# Patient Record
Sex: FEMALE | Race: WHITE | NOT HISPANIC OR LATINO | Employment: UNEMPLOYED | URBAN - NONMETROPOLITAN AREA
[De-identification: names, ages, dates, MRNs, and addresses within clinical notes are randomized per-mention and may not be internally consistent; named-entity substitution may affect disease eponyms.]

---
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
## 2023-01-28 ENCOUNTER — APPOINTMENT (EMERGENCY)
Dept: RADIOLOGY | Facility: HOSPITAL | Age: 18
End: 2023-01-28

## 2023-01-28 ENCOUNTER — HOSPITAL ENCOUNTER (EMERGENCY)
Facility: HOSPITAL | Age: 18
Discharge: HOME | End: 2023-01-29
Attending: EMERGENCY MEDICINE

## 2023-01-28 VITALS
DIASTOLIC BLOOD PRESSURE: 70 MMHG | TEMPERATURE: 98.7 F | HEART RATE: 57 BPM | SYSTOLIC BLOOD PRESSURE: 124 MMHG | OXYGEN SATURATION: 99 % | WEIGHT: 130.51 LBS | RESPIRATION RATE: 16 BRPM | BODY MASS INDEX: 23.87 KG/M2

## 2023-01-28 DIAGNOSIS — M79.671 ACUTE FOOT PAIN, RIGHT: Primary | ICD-10-CM

## 2023-01-28 RX ORDER — IBUPROFEN 600 MG/1
600 TABLET ORAL ONCE
Status: COMPLETED | OUTPATIENT
Start: 2023-01-28 | End: 2023-01-28

## 2023-01-28 RX ADMIN — IBUPROFEN 600 MG: 600 TABLET, FILM COATED ORAL at 23:39

## 2023-01-29 NOTE — DISCHARGE INSTRUCTIONS
You have been seen for right ankle pain  Please take tylenol and motrin OTC as discussed  Please use the provided crutches and ankle splint as discussed  Return to the emergency department if you develop worsening pain, weakness/numbness/tingling or any other symptoms of concern  Please follow up with podiatry by calling the number provided

## 2023-01-29 NOTE — ED PROVIDER NOTES
History  Chief Complaint   Patient presents with   • Foot Pain     Right foot pain, injured doing a front hand spring, occurred last pain  Took Motrin early this morning for pain  Martins Peaks is a 21y o  year old female presenting to the [place] [address] ED for right foot/ankle pain  Patient has had one day of pain after landing awkwardly on the right ankle when doing a front hand spring  She did not hear a pop or click  Pain currently 6/10, constant and nonradiating  Worse when walking on the right foot  Mostly located on the outside of the right foot  Mild swelling in the area  No weakness/numbness/tingling in the RLE  The patient has taken motrin at home for symptomatic treatment   used: No        Prior to Admission Medications   Prescriptions Last Dose Informant Patient Reported? Taking?    Flovent  MCG/ACT inhaler   Yes No   Sig: Inhale 2 puffs 2 (two) times a day   Patient not taking: Reported on 2023   Prenatal MV-Min-Fe Fum-FA-DHA (PRENATAL 1 PO)   Yes No   Sig: Take by mouth   Patient not taking: Reported on 2022   Xulane 150-35 MCG/24HR   Yes No   Sig: APPLY 1 PATCH ONE TIME PER WEEK   albuterol (PROVENTIL HFA,VENTOLIN HFA) 90 mcg/act inhaler   Yes No   Sig: Inhale 2 puffs every 6 (six) hours as needed for wheezing     ferrous sulfate 325 (65 Fe) mg tablet   Yes No   Patient not taking: Reported on 2023   fluticasone (FLONASE) 50 mcg/act nasal spray   No No   Si spray into each nostril daily   Patient not taking: Reported on 2023   ondansetron (Zofran ODT) 4 mg disintegrating tablet   No No   Sig: Take 1 tablet (4 mg total) by mouth every 6 (six) hours as needed for nausea or vomiting      Facility-Administered Medications: None       Past Medical History:   Diagnosis Date   • Anxiety    • Asthma    • Back pain    • Depression    • POTS (postural orthostatic tachycardia syndrome)        Past Surgical History:   Procedure Laterality Date   • TONSILLECTOMY     • TONSILLECTOMY         Family History   Problem Relation Age of Onset   • Bipolar disorder Mother    • Scoliosis Mother    • Allergy (severe) Sister    • Kidney disease Brother    • Breast cancer Maternal Aunt    • Diabetes Maternal Grandmother      I have reviewed and agree with the history as documented  E-Cigarette/Vaping   • E-Cigarette Use Never User      E-Cigarette/Vaping Substances   • Nicotine No    • THC No    • CBD No    • Flavoring No    • Other No    • Unknown No      Social History     Tobacco Use   • Smoking status: Former     Packs/day: 0 25     Types: Cigarettes   • Smokeless tobacco: Never   • Tobacco comments:     one cigarette a day   Vaping Use   • Vaping Use: Never used   Substance Use Topics   • Alcohol use: Not Currently   • Drug use: Not Currently     Comment: previopus use of meth  Review of Systems   Constitutional: Negative for fever  Respiratory: Negative for shortness of breath  Cardiovascular: Negative for chest pain  Gastrointestinal: Negative for abdominal pain  Musculoskeletal: Positive for arthralgias  Negative for myalgias  Skin: Negative for wound  Neurological: Negative for syncope, weakness, numbness and headaches  All other systems reviewed and are negative  Physical Exam  Physical Exam  Vitals and nursing note reviewed  Constitutional:       General: She is not in acute distress  Appearance: Normal appearance  She is well-developed  She is not ill-appearing, toxic-appearing or diaphoretic  HENT:      Head: Normocephalic and atraumatic  Nose: No congestion or rhinorrhea  Eyes:      General:         Right eye: No discharge  Left eye: No discharge  Cardiovascular:      Rate and Rhythm: Normal rate and regular rhythm  Pulmonary:      Effort: Pulmonary effort is normal  No accessory muscle usage or respiratory distress  Breath sounds: Normal breath sounds  No stridor   No decreased breath sounds, wheezing, rhonchi or rales  Abdominal:      General: There is no distension  Palpations: Abdomen is soft  Tenderness: There is no abdominal tenderness  There is no guarding or rebound  Musculoskeletal:      Cervical back: Normal range of motion  No rigidity  Right knee: No swelling or bony tenderness  Normal range of motion  No tenderness  Right lower leg: No tenderness  No edema  Left lower leg: No tenderness  No edema  Right ankle: No swelling  Tenderness present over the ATF ligament  Right Achilles Tendon: No tenderness  Right foot: Normal range of motion and normal capillary refill  Swelling, tenderness and bony tenderness (proximal 4/5 metatarsal) present  No deformity or crepitus  Normal pulse  Skin:     Capillary Refill: Capillary refill takes less than 2 seconds  Findings: No abrasion or laceration  Neurological:      Mental Status: She is alert and oriented to person, place, and time  Psychiatric:         Mood and Affect: Mood normal          Behavior: Behavior normal          Vital Signs  ED Triage Vitals [01/28/23 2240]   Temperature Pulse Respirations Blood Pressure SpO2   98 7 °F (37 1 °C) 57 16 124/70 99 %      Temp Source Heart Rate Source Patient Position - Orthostatic VS BP Location FiO2 (%)   Tympanic Monitor Sitting Right arm --      Pain Score       5           Vitals:    01/28/23 2240   BP: 124/70   Pulse: 57   Patient Position - Orthostatic VS: Sitting         Visual Acuity      ED Medications  Medications   ibuprofen (MOTRIN) tablet 600 mg (600 mg Oral Given 1/28/23 2339)       Diagnostic Studies  Results Reviewed     None                 XR foot 3+ views RIGHT   ED Interpretation by Carter Sanderson DO (01/28 2324)   No acute fracture or dislocation  XR ankle 3+ views RIGHT   ED Interpretation by Carter Sanderson DO (01/28 2324)   No acute fracture or dislocation                   Procedures  Procedures         ED Course Medical Decision Making    21 y o  female presenting for right foot/ankle pain  RLE NVI  Will order xray and treat symptomatically  I have reviewed preliminary ED interpretation of x-rays with the patient  The patient understands that their x-rays will be interpreted independently by a radiologist at a later time  The patient is agreeable to discharge at this time and understands that they may be called back to the emergency department pending formal xray interpretation by radiology  I have discussed with the patient our plan to discharge them from the ED and the patient is in agreement with this plan  The patient was provided a written after visit summary with strict RTED precautions  Discharge Plan: PRN tylenol/motrin, RLE in boot  Recommended crutches and NWB RLE, patient declines crutches  Followup: I have discussed with the patient plan to follow up with podiatry  Contact information provided in AVS     Acute foot pain, right: acute illness or injury  Amount and/or Complexity of Data Reviewed  Radiology: ordered and independent interpretation performed  Risk  Prescription drug management  Disposition  Final diagnoses:   Acute foot pain, right     Time reflects when diagnosis was documented in both MDM as applicable and the Disposition within this note     Time User Action Codes Description Comment    1/28/2023 11:24 PM [address]ang Apt Add [M25 571] Acute right ankle pain     1/28/2023 11:30 PM [address] Apt Remove [Z41 663] Acute right ankle pain     1/28/2023 11:30 PM [address] Apt Add [M28 947] Acute foot pain, right       ED Disposition     ED Disposition   Discharge    Condition   Stable    Date/Time   Sat Jan 28, 2023 11:24 PM    Comment   Kyrie Rodríguez discharge to home/self care                 Follow-up Information     Follow up With Specialties Details Why Contact Info Additional Information    [facility] Schedule an appointment as soon as possible for a visit in 1 week  [address] [facility] [address]  [address] [place] [address], [address] [place] [address], [id number], [phone]          Discharge Medication List as of 1/29/2023 12:03 AM      CONTINUE these medications which have NOT CHANGED    Details   albuterol (PROVENTIL HFA,VENTOLIN HFA) 90 mcg/act inhaler Inhale 2 puffs every 6 (six) hours as needed for wheezing  , Historical Med      ferrous sulfate 325 (65 Fe) mg tablet Starting Mon 3/28/2022, Historical Med      Flovent  MCG/ACT inhaler Inhale 2 puffs 2 (two) times a day, Starting Mon 4/18/2022, Historical Med      fluticasone (FLONASE) 50 mcg/act nasal spray 1 spray into each nostril daily, Starting Sat 6/18/2022, Normal      ondansetron (Zofran ODT) 4 mg disintegrating tablet Take 1 tablet (4 mg total) by mouth every 6 (six) hours as needed for nausea or vomiting, Starting Fri 6/24/2022, Normal      Prenatal MV-Min-Fe Fum-FA-DHA (PRENATAL 1 PO) Take by mouth, Historical Med      Xulane 150-35 MCG/24HR APPLY 1 PATCH ONE TIME PER WEEK, Historical Med             No discharge procedures on file      PDMP Review     None          ED Provider  Electronically Signed by           Samantha Simmons DO  01/29/23 7863

## 2023-02-06 ENCOUNTER — OFFICE VISIT (OUTPATIENT)
Dept: PODIATRY | Facility: CLINIC | Age: 18
End: 2023-02-06

## 2023-02-06 VITALS
WEIGHT: 127 LBS | BODY MASS INDEX: 23.37 KG/M2 | HEIGHT: 62 IN | DIASTOLIC BLOOD PRESSURE: 72 MMHG | SYSTOLIC BLOOD PRESSURE: 108 MMHG | HEART RATE: 66 BPM

## 2023-02-06 DIAGNOSIS — M25.571 ACUTE RIGHT ANKLE PAIN: Primary | ICD-10-CM

## 2023-02-06 DIAGNOSIS — S93.491A SPRAIN OF ANTERIOR TALOFIBULAR LIGAMENT OF RIGHT ANKLE, INITIAL ENCOUNTER: ICD-10-CM

## 2023-02-06 RX ORDER — ERGOCALCIFEROL 1.25 MG/1
CAPSULE ORAL
COMMUNITY
Start: 2023-01-20

## 2023-02-06 RX ORDER — METHOCARBAMOL 500 MG/1
TABLET, FILM COATED ORAL
COMMUNITY
Start: 2023-01-16

## 2023-02-06 RX ORDER — NAPROXEN 500 MG/1
TABLET ORAL
COMMUNITY
Start: 2023-01-16

## 2023-02-06 NOTE — LETTER
February 7, 2023     Zee Melchor PA-C  [address], [address]    Patient: Rayne Pelletier   YOB: 2005   Date of Visit: 2/6/2023       Dear Dr Irena Dueñas: Thank you for referring Beryle Savoy to me for evaluation  Below are my notes for this consultation  If you have questions, please do not hesitate to call me  I look forward to following your patient along with you  Sincerely,        Uma Heredia DPM        CC: No Recipients  Uma Heredia DPM  2/6/2023  5:00 PM  Signed  Assessment/Plan:    No problem-specific Assessment & Plan notes found for this encounter  Diagnoses and all orders for this visit:    Acute right ankle pain  -     Ambulatory referral to Physical Therapy; Future    Sprain of anterior talofibular ligament of right ankle, initial encounter  -     Ambulatory referral to Physical Therapy; Future    Other orders  -     ergocalciferol (VITAMIN D2) 50,000 units; TAKE 1 CAPSULE BY MOUTH ONE TIME PER WEEK  -     methocarbamol (ROBAXIN) 500 mg tablet; TAKE 1 TABLET BY MOUTH 4 TIMES A DAY AS NEEDED FOR MUSCLE SPASMS  -     naproxen (NAPROSYN) 500 mg tablet; TAKE 1 TABLET BY MOUTH 2 TIMES A DAY AS NEEDED FOR MODERATE PAIN (PAIN SCORE 4-6)  TAKE WITH MEALS  Plan:     Diagnosis and treatment discussed with patient   - Reviewed xrays and discussed findings with patient, no osseous abnormalities noted  - I suspect left ankle sprain with ATFL ligament tear  I educated patient on importance of ankle sprain functional rehab ROM, strengthening, stretching and balance control exercises  Provided rehab protocol and recommended to wear ASO ankle brace  I informed patient it will take about 8-12 weeks for ankle ligament to heal and swelling can take up to an year to subside  Rest, ice and compress with ace wrap  May take NSAIDs as needed for pain  Expresses understanding and will continue with these recommendations     - Rx for physical therapy to start in 2 weeks  - Return in 4 weeks for re-evaluation      Subjective:     Patient ID: Eliazar Oliva is a 21 y o  female  15-year-old female with past medical history as below presents for an evaluation of right ankle injury she sustained last week  Patient reports she was evaluated in ER where x-rays were obtained as well as given a boot that she has been wearing as tolerated  Patient reports she feels more secure while wearing the boot  She denies any other complaints at this time  Denies nausea vomit fever chills shortness of breath  The following portions of the patient's history were reviewed and updated as appropriate: She  has a past medical history of Anxiety, Asthma, Back pain, Depression, and POTS (postural orthostatic tachycardia syndrome)  She   Patient Active Problem List    Diagnosis Date Noted   • Thoracogenic scoliosis of thoracic region 04/19/2022   • Asthma 04/03/2019     She  has a past surgical history that includes Tonsillectomy and Tonsillectomy       Review of Systems   All other systems reviewed and are negative  Objective:      /72 (BP Location: Right arm, Patient Position: Sitting, Cuff Size: Standard)   Pulse 66   Ht 5' 2" (1 575 m)   Wt 57 6 kg (127 lb) Comment: verbal, cam boot  BMI 23 23 kg/m²         Physical Exam  Vitals reviewed  Musculoskeletal:      Right ankle: Tenderness present over the ATF ligament  Comments: Continues to touch noted on the right ankle at the level of ATFL  Ankle range of motion limited due to guarding from pain  Ankle eversion and plantarflexion weak  Sensation intact  Palpable pedal pulses

## 2023-02-06 NOTE — PROGRESS NOTES
Assessment/Plan:    No problem-specific Assessment & Plan notes found for this encounter  Diagnoses and all orders for this visit:    Acute right ankle pain  -     Ambulatory referral to Physical Therapy; Future    Sprain of anterior talofibular ligament of right ankle, initial encounter  -     Ambulatory referral to Physical Therapy; Future    Other orders  -     ergocalciferol (VITAMIN D2) 50,000 units; TAKE 1 CAPSULE BY MOUTH ONE TIME PER WEEK  -     methocarbamol (ROBAXIN) 500 mg tablet; TAKE 1 TABLET BY MOUTH 4 TIMES A DAY AS NEEDED FOR MUSCLE SPASMS  -     naproxen (NAPROSYN) 500 mg tablet; TAKE 1 TABLET BY MOUTH 2 TIMES A DAY AS NEEDED FOR MODERATE PAIN (PAIN SCORE 4-6)  TAKE WITH MEALS  Plan:     Diagnosis and treatment discussed with patient   - Reviewed xrays and discussed findings with patient, no osseous abnormalities noted  - I suspect left ankle sprain with ATFL ligament tear  I educated patient on importance of ankle sprain functional rehab ROM, strengthening, stretching and balance control exercises  Provided rehab protocol and recommended to wear ASO ankle brace  I informed patient it will take about 8-12 weeks for ankle ligament to heal and swelling can take up to an year to subside  Rest, ice and compress with ace wrap  May take NSAIDs as needed for pain  Expresses understanding and will continue with these recommendations  - Rx for physical therapy to start in 2 weeks  - Return in 4 weeks for re-evaluation      Subjective:      Patient ID: Alvaro Rivas is a 21 y o  female  29-year-old female with past medical history as below presents for an evaluation of right ankle injury she sustained last week  Patient reports she was evaluated in ER where x-rays were obtained as well as given a boot that she has been wearing as tolerated  Patient reports she feels more secure while wearing the boot  She denies any other complaints at this time    Denies nausea vomit fever chills shortness of breath  The following portions of the patient's history were reviewed and updated as appropriate: She  has a past medical history of Anxiety, Asthma, Back pain, Depression, and POTS (postural orthostatic tachycardia syndrome)  She   Patient Active Problem List    Diagnosis Date Noted   • Thoracogenic scoliosis of thoracic region 04/19/2022   • Asthma 04/03/2019     She  has a past surgical history that includes Tonsillectomy and Tonsillectomy       Review of Systems   All other systems reviewed and are negative  Objective:      /72 (BP Location: Right arm, Patient Position: Sitting, Cuff Size: Standard)   Pulse 66   Ht 5' 2" (1 575 m)   Wt 57 6 kg (127 lb) Comment: verbal, cam boot  BMI 23 23 kg/m²          Physical Exam  Vitals reviewed  Musculoskeletal:      Right ankle: Tenderness present over the ATF ligament  Comments: Continues to touch noted on the right ankle at the level of ATFL  Ankle range of motion limited due to guarding from pain  Ankle eversion and plantarflexion weak  Sensation intact  Palpable pedal pulses

## 2023-02-15 ENCOUNTER — APPOINTMENT (EMERGENCY)
Dept: RADIOLOGY | Facility: HOSPITAL | Age: 18
End: 2023-02-15

## 2023-02-15 ENCOUNTER — HOSPITAL ENCOUNTER (EMERGENCY)
Facility: HOSPITAL | Age: 18
Discharge: HOME | End: 2023-02-15
Attending: EMERGENCY MEDICINE

## 2023-02-15 VITALS
TEMPERATURE: 98.4 F | HEART RATE: 97 BPM | RESPIRATION RATE: 17 BRPM | SYSTOLIC BLOOD PRESSURE: 128 MMHG | OXYGEN SATURATION: 99 % | DIASTOLIC BLOOD PRESSURE: 60 MMHG

## 2023-02-15 DIAGNOSIS — S93.401A RIGHT ANKLE SPRAIN: ICD-10-CM

## 2023-02-15 DIAGNOSIS — M79.671 RIGHT FOOT PAIN: Primary | ICD-10-CM

## 2023-02-15 RX ORDER — IBUPROFEN 600 MG/1
600 TABLET ORAL ONCE
Status: COMPLETED | OUTPATIENT
Start: 2023-02-15 | End: 2023-02-15

## 2023-02-15 RX ORDER — ACETAMINOPHEN 325 MG/1
650 TABLET ORAL ONCE
Status: COMPLETED | OUTPATIENT
Start: 2023-02-15 | End: 2023-02-15

## 2023-02-15 RX ADMIN — IBUPROFEN 600 MG: 600 TABLET, FILM COATED ORAL at 19:44

## 2023-02-15 RX ADMIN — ACETAMINOPHEN 650 MG: 325 TABLET ORAL at 19:44

## 2023-02-16 NOTE — ED PROVIDER NOTES
History  Chief Complaint   Patient presents with   • Foot Pain     Patient states this morning she was messing around with a friend and injured her right foot  The pain is radiating up her leg  Pain 7/10 at this time  Patient took ibuprofen around 1100 today  21year old female with PMH anxiety, POTS, asthma presents with mom for evaluation of right foot and ankle pain  Pt notes a prior injury a few weeks ago  She was recently seen by a foot specialist and was told she has a sprain  She notes this morning she was messing around with a friend and while her foot was on the bed, her friend kneeled on it  She reports she had more intense pain in the previous area she injured  She reports pain on top of foot which radiates into her ankle  She reports swelling  Tried some ibuprofen earlier today without relief  Pain aggravated by attempts at movement and weight bearing  She has been using her crutches  Is supposed to be wearing her boot but not using currently  Denies fever, chills  Denies cough, congestion or recent illness  Denies chest pain, SOB, N/V/D, abdominal pain  No other injuries reported  History provided by:  Patient and medical records   used: No        Prior to Admission Medications   Prescriptions Last Dose Informant Patient Reported? Taking?    Flovent  MCG/ACT inhaler   Yes No   Sig: Inhale 2 puffs 2 (two) times a day   Prenatal MV-Min-Fe Fum-FA-DHA (PRENATAL 1 PO)   Yes No   Sig: Take by mouth   Xulane 150-35 MCG/24HR   Yes No   Sig: APPLY 1 PATCH ONE TIME PER WEEK   albuterol (PROVENTIL HFA,VENTOLIN HFA) 90 mcg/act inhaler   Yes No   Sig: Inhale 2 puffs every 6 (six) hours as needed for wheezing     ergocalciferol (VITAMIN D2) 50,000 units   Yes No   Sig: TAKE 1 CAPSULE BY MOUTH ONE TIME PER WEEK   ferrous sulfate 325 (65 Fe) mg tablet   Yes No   fluticasone (FLONASE) 50 mcg/act nasal spray   No No   Si spray into each nostril daily   methocarbamol (ROBAXIN) 500 mg tablet   Yes No   Sig: TAKE 1 TABLET BY MOUTH 4 TIMES A DAY AS NEEDED FOR MUSCLE SPASMS  naproxen (NAPROSYN) 500 mg tablet   Yes No   Sig: TAKE 1 TABLET BY MOUTH 2 TIMES A DAY AS NEEDED FOR MODERATE PAIN (PAIN SCORE 4-6)  TAKE WITH MEALS    ondansetron (Zofran ODT) 4 mg disintegrating tablet   No No   Sig: Take 1 tablet (4 mg total) by mouth every 6 (six) hours as needed for nausea or vomiting      Facility-Administered Medications: None       Past Medical History:   Diagnosis Date   • Anxiety    • Asthma    • Back pain    • Depression    • POTS (postural orthostatic tachycardia syndrome)        Past Surgical History:   Procedure Laterality Date   • TONSILLECTOMY     • TONSILLECTOMY         Family History   Problem Relation Age of Onset   • Bipolar disorder Mother    • Scoliosis Mother    • Allergy (severe) Sister    • Kidney disease Brother    • Breast cancer Maternal Aunt    • Diabetes Maternal Grandmother      I have reviewed and agree with the history as documented  E-Cigarette/Vaping   • E-Cigarette Use Never User      E-Cigarette/Vaping Substances   • Nicotine No    • THC No    • CBD No    • Flavoring No    • Other No    • Unknown No      Social History     Tobacco Use   • Smoking status: Former     Packs/day: 0 25     Types: Cigarettes   • Smokeless tobacco: Never   • Tobacco comments:     one cigarette a day   Vaping Use   • Vaping Use: Never used   Substance Use Topics   • Alcohol use: Not Currently   • Drug use: Not Currently     Comment: previopus use of meth  Review of Systems   Constitutional: Negative  Negative for chills, fatigue and fever  HENT: Negative  Negative for congestion, rhinorrhea and sore throat  Eyes: Negative  Respiratory: Negative  Negative for cough, shortness of breath and wheezing  Cardiovascular: Negative  Negative for chest pain  Gastrointestinal: Negative  Negative for abdominal pain, constipation, diarrhea, nausea and vomiting  Genitourinary: Negative  Musculoskeletal: Positive for arthralgias  Negative for back pain  Skin: Negative  Negative for rash  Neurological: Negative  Negative for dizziness, light-headedness, numbness and headaches  Psychiatric/Behavioral: Negative  All other systems reviewed and are negative  Physical Exam  Physical Exam  Vitals and nursing note reviewed  Constitutional:       General: She is awake  She is not in acute distress  Appearance: She is well-developed  She is not toxic-appearing  HENT:      Head: Normocephalic and atraumatic  Right Ear: Hearing and external ear normal       Left Ear: Hearing and external ear normal       Nose: Nose normal       Mouth/Throat:      Mouth: Mucous membranes are moist       Pharynx: Oropharynx is clear  Uvula midline  Eyes:      General: Lids are normal  No scleral icterus  Conjunctiva/sclera: Conjunctivae normal    Neck:      Trachea: Trachea and phonation normal  No tracheal deviation  Cardiovascular:      Rate and Rhythm: Normal rate and regular rhythm  Pulses: Normal pulses  Dorsalis pedis pulses are 2+ on the right side and 2+ on the left side  Posterior tibial pulses are 2+ on the right side and 2+ on the left side  Heart sounds: Normal heart sounds, S1 normal and S2 normal  No murmur heard  Pulmonary:      Effort: Pulmonary effort is normal  No tachypnea or respiratory distress  Breath sounds: Normal breath sounds  Musculoskeletal:      Right knee: No bony tenderness  Normal range of motion  Normal pulse  Right lower leg: No edema  Left lower leg: No edema  Right ankle: No swelling or deformity  Normal range of motion  Normal pulse  Right foot: Normal capillary refill  Swelling and tenderness present  No deformity or crepitus  Normal pulse  Feet:       Comments: In area of tenderness there is mild swelling with old bruising noted    Pt notes this is where she was previously injured a few weeks ago but now having more pain in this area  Skin:     General: Skin is warm and dry  Capillary Refill: Capillary refill takes less than 2 seconds  Findings: No rash  Neurological:      Mental Status: She is alert and oriented to person, place, and time  Cranial Nerves: No cranial nerve deficit  Sensory: No sensory deficit  Motor: No abnormal muscle tone  Psychiatric:         Speech: Speech normal          Behavior: Behavior normal          Vital Signs  ED Triage Vitals [02/15/23 1858]   Temperature Pulse Respirations Blood Pressure SpO2   98 4 °F (36 9 °C) 97 17 128/60 99 %      Temp Source Heart Rate Source Patient Position - Orthostatic VS BP Location FiO2 (%)   Temporal Monitor -- -- --      Pain Score       7           Vitals:    02/15/23 1858   BP: 128/60   Pulse: 97         Visual Acuity      ED Medications  Medications   ibuprofen (MOTRIN) tablet 600 mg (600 mg Oral Given 2/15/23 1944)   acetaminophen (TYLENOL) tablet 650 mg (650 mg Oral Given 2/15/23 1944)       Diagnostic Studies  Results Reviewed     None                 XR foot 3+ views RIGHT    (Results Pending)   XR ankle 3+ views RIGHT    (Results Pending)              Procedures  Procedures         ED Course  ED Course as of 02/15/23 2254   Wed Feb 15, 2023   1915 Reviewed ED visit from 1/28/23 - seen for right ankle sprain  Xrays at that time without acute osseous findings  Placed in a boot and discharged with crutches  Was seen by podiatry on 2/6/23 and referred to PT    1932 XR ankle 3+ views RIGHT  Independently viewed and interpreted by me - no fracture or acute osseous findings, no interval change from prior; pending official read  1932 XR foot 3+ views RIGHT  Independently viewed and interpreted by me - no fracture or acute osseous findings, no interval change from prior; pending official read  1934 Pt updated on results  Pt reports the boot is painful to wear    Will give ACE wrap and aircast   Pt has crutches at bedside  Will discharge and have her follow up with podiatry  Pt neurovascularly intact post application of ACE and aircast   Continue use of crutches  Reviewed RICE therapy  Continue OTC tylenol and ibuprofen as needed for pain relief  Strict return precautions outlined  Advised outpatient follow up with podiatry or return to ER for change in condition as outlined  Pt and mother verbalized understanding and had no further questions  Medical Decision Making  Right ankle sprain: acute illness or injury     Details: Recent sprain with new injury today  recommended continued use of boot or brace, crutches and outpatient follow up with podiatry  Right foot pain: acute illness or injury     Details: no fracture or dislocation  no new findings from prior  suspect prior sprain plus contusion as etiology of pain  Amount and/or Complexity of Data Reviewed  External Data Reviewed: radiology and notes  Radiology: ordered and independent interpretation performed  Decision-making details documented in ED Course  Risk  OTC drugs  Prescription drug management  Disposition  Final diagnoses:   Right foot pain   Right ankle sprain     Time reflects when diagnosis was documented in both MDM as applicable and the Disposition within this note     Time User Action Codes Description Comment    2/15/2023  7:40 PM Manisha Bouillon Add [K57 710] Right foot pain     2/15/2023  7:40 PM Manisha Bouillon Add [T15 623Y] Right ankle sprain       ED Disposition     ED Disposition   Discharge    Condition   Stable    Date/Time   Wed Feb 15, 2023  7:40 PM    Comment   Reese Rodríguez discharge to home/self care                 Follow-up Information     Follow up With Specialties Details Why Contact Info Additional Information    Ning Meléndez DPM Podiatry, Wound Care Schedule an appointment as soon as possible for a visit   [phone] [facility]  [address]  [address] [facility] [address]  [facility]  As needed [phone]  [address] [facility], [address], [address]          Discharge Medication List as of 2/15/2023  7:41 PM      CONTINUE these medications which have NOT CHANGED    Details   albuterol (PROVENTIL HFA,VENTOLIN HFA) 90 mcg/act inhaler Inhale 2 puffs every 6 (six) hours as needed for wheezing  , Historical Med      ergocalciferol (VITAMIN D2) 50,000 units TAKE 1 CAPSULE BY MOUTH ONE TIME PER WEEK, Historical Med      ferrous sulfate 325 (65 Fe) mg tablet Starting Mon 3/28/2022, Historical Med      Flovent  MCG/ACT inhaler Inhale 2 puffs 2 (two) times a day, Starting Mon 4/18/2022, Historical Med      fluticasone (FLONASE) 50 mcg/act nasal spray 1 spray into each nostril daily, Starting Sat 6/18/2022, Normal      methocarbamol (ROBAXIN) 500 mg tablet TAKE 1 TABLET BY MOUTH 4 TIMES A DAY AS NEEDED FOR MUSCLE SPASMS , Historical Med      naproxen (NAPROSYN) 500 mg tablet TAKE 1 TABLET BY MOUTH 2 TIMES A DAY AS NEEDED FOR MODERATE PAIN (PAIN SCORE 4-6)  TAKE WITH MEALS , Historical Med      ondansetron (Zofran ODT) 4 mg disintegrating tablet Take 1 tablet (4 mg total) by mouth every 6 (six) hours as needed for nausea or vomiting, Starting Fri 6/24/2022, Normal      Prenatal MV-Min-Fe Fum-FA-DHA (PRENATAL 1 PO) Take by mouth, Historical Med      Xulane 150-35 MCG/24HR APPLY 1 PATCH ONE TIME PER WEEK, Historical Med             No discharge procedures on file      PDMP Review     None          ED Provider  Electronically Signed by           Rohan Louis PA-C  02/15/23 1464

## 2023-02-16 NOTE — DISCHARGE INSTRUCTIONS
Rest, ice, compression, elevation  ACE wrap and aircast and crutches  Continue anti-inflammatory as directed with food  Can supplement with OTC tylenol  Follow up with podiatry for recheck - call them about tonight's ER visit to follow up  Return to ER as needed

## 2023-02-17 ENCOUNTER — OFFICE VISIT (OUTPATIENT)
Dept: PODIATRY | Facility: CLINIC | Age: 18
End: 2023-02-17

## 2023-02-17 VITALS
SYSTOLIC BLOOD PRESSURE: 116 MMHG | HEART RATE: 69 BPM | WEIGHT: 127 LBS | BODY MASS INDEX: 23.37 KG/M2 | DIASTOLIC BLOOD PRESSURE: 56 MMHG | HEIGHT: 62 IN

## 2023-02-17 DIAGNOSIS — S93.601A SPRAIN OF RIGHT FOOT, INITIAL ENCOUNTER: ICD-10-CM

## 2023-02-17 DIAGNOSIS — M79.671 RIGHT FOOT PAIN: Primary | ICD-10-CM

## 2023-02-18 NOTE — PROGRESS NOTES
Assessment/Plan:    No problem-specific Assessment & Plan notes found for this encounter  Diagnoses and all orders for this visit:    Right foot pain    Sprain of right foot, initial encounter      Plan:     -  Patient was counseled and educated on the condition and the diagnosis  The diagnosis, treatment options and prognosis were discussed in detail    -Right foot and ankle x-rays reviewed, I personally reviewed the x-ray finding with patient which is consistent without any acute osseous abnormalities  -I suspect patient has reinjured her initial ankle sprain injury as well as foot contusion versus sprain on the lateral midfoot area  I recommended her to weight-bear as tolerated in cam boot for 2 to 3 weeks then transition to sneakers with weight-bear as tolerated  -Start physical therapy within 1 to 2 weeks, for lower extremity strengthening stretching and proprioception exercises  -As needed pain control with over-the-counter medication  -Rest ice elevate  -Avoid such reinjuries to right foot  -Addressed all questions and concerns  -Recent ER notes reviewed  -Return in 4-6 weeks for follow-up    Subjective:      Patient ID: Abbey Powers is a 21 y o  female  21year old female with PMH anxiety, POTS, asthma presents with mom for evaluation of right foot and ankle pain  Patient reports couple days ago she was messing around with a friend and while her foot was on the bed, her friend kneeled on it  She reports she had more intense pain in the previous area she injured and foot  Reports radiating foot pain going proximally to her ankle  Pain aggravated by attempts at movement and weight bearing  She was evaluated in the ER where x-rays were obtained consistent with negative fractures or dislocations  Patient was given an Aircast with weight-bear as tolerated as she was unable to wear her cam boot due to pressure from where the injury was  No other complaints    Denies nausea vomiting fever chills shortness of breath  The following portions of the patient's history were reviewed and updated as appropriate:   She  has a past medical history of Anxiety, Asthma, Back pain, Depression, and POTS (postural orthostatic tachycardia syndrome)  She   Patient Active Problem List    Diagnosis Date Noted   • Thoracogenic scoliosis of thoracic region 04/19/2022   • Asthma 04/03/2019     She  has a past surgical history that includes Tonsillectomy and Tonsillectomy       Review of Systems   Constitutional: Negative for appetite change, chills and fatigue  Respiratory: Negative for shortness of breath  Gastrointestinal: Negative for vomiting  Musculoskeletal: Positive for arthralgias  Skin: Negative for color change  Neurological: Negative for dizziness  Psychiatric/Behavioral: Negative for agitation  Objective:      /56   Pulse 69   Ht 5' 2" (1 575 m)   Wt 57 6 kg (127 lb)   BMI 23 23 kg/m²          Physical Exam  Vitals reviewed  Cardiovascular:      Rate and Rhythm: Normal rate  Pulses: Normal pulses  Musculoskeletal:         General: Swelling and tenderness present  Right foot: Decreased range of motion  Swelling, tenderness and bony tenderness present  Comments: Mild midfoot as well as lateral ankle edema noted  No signs of bruising or ecchymosis present  Pain with palpation noted throughout the midfoot as well as lateral ankle at the level of ATFL  No discomfort along the peroneal tendons or medial ankle tendons  Decreased ankle range of motion  Light touch sensation intact  Pedal pulses intact  Skin:     General: Skin is warm  Neurological:      General: No focal deficit present  Mental Status: She is alert     Psychiatric:         Mood and Affect: Mood normal

## 2023-02-21 ENCOUNTER — EVALUATION (OUTPATIENT)
Dept: PHYSICAL THERAPY | Facility: HOME HEALTHCARE | Age: 18
End: 2023-02-21

## 2023-02-21 DIAGNOSIS — M25.571 ACUTE RIGHT ANKLE PAIN: ICD-10-CM

## 2023-02-21 DIAGNOSIS — S93.491A SPRAIN OF ANTERIOR TALOFIBULAR LIGAMENT OF RIGHT ANKLE, INITIAL ENCOUNTER: ICD-10-CM

## 2023-02-21 DIAGNOSIS — R42 DIZZINESS: ICD-10-CM

## 2023-02-21 DIAGNOSIS — S06.0X0D CONCUSSION WITHOUT LOSS OF CONSCIOUSNESS, SUBSEQUENT ENCOUNTER: ICD-10-CM

## 2023-02-21 DIAGNOSIS — H51.11 CONVERGENCE INSUFFICIENCY: ICD-10-CM

## 2023-02-21 DIAGNOSIS — G44.319 ACUTE POST-TRAUMATIC HEADACHE, NOT INTRACTABLE: ICD-10-CM

## 2023-02-21 NOTE — PROGRESS NOTES
PT Evaluation     Today's date: 2023  Patient name: Princess Linda  : 2005  MRN: [id number]  Referring provider: Rhea Schirmer, DPM  Dx:   Encounter Diagnosis     ICD-10-CM    1  Acute right ankle pain  M25 [id number] Ambulatory referral to Physical Therapy      2  Sprain of anterior talofibular ligament of right ankle, initial encounter  S93 491A Ambulatory referral to Physical Therapy      3  Concussion without loss of consciousness, subsequent encounter  S06 0X0D Ambulatory Referral to Physical Therapy      4  Acute post-traumatic headache, not intractable  G44 [id number] Ambulatory Referral to Physical Therapy      5  Dizziness  R42 Ambulatory Referral to Physical Therapy      6  Convergence insufficiency  H51 11 Ambulatory Referral to Physical Therapy                     Assessment  Assessment details: Pt Steve Sarmiento is a 21 y o  who presents to OPPT with s/s consistent with R ankle grade III sprain; possible nerve injury /stretch due to trace motor contraction  Pt with limited R ankle mobility, decreased R ankle strength, gait dysfunction, balance dysfunction, joint edema, and pain with functional mobility  Pt notes limited tolerance to prolonged ambulation, difficulty with stair negotiation, decreased tolerance to typical ADLs, and increased swelling towards end of day  She is currently ambulatory with use of Cam boot and crutches with MD notes to progress to full [address] as tolerable in the next 2-3 weeks  Pt would benefit from skilled therapy services to address outlined impairments, work towards goals, and restore pts PLOF  Thank you!    Impairments: abnormal gait, abnormal or restricted ROM, abnormal movement, activity intolerance, impaired balance, impaired physical strength, lacks appropriate home exercise program, pain with function, safety issue and weight-bearing intolerance  Understanding of Dx/Px/POC: good   Prognosis: good    Goals  STGs to be achieved in 4 weeks:  -Pt to demonstrate reduced subjective pain rating "at worst" by at least 2-3 points from Initial Eval to allow for reduced pain with ADLs and improved functional activity tolerance    -Pt to demonstrate R ankle ROM improved by 5* in order to maximize joint mobility and function and allow for progression of exercise program and achievement of goals    -Pt to demonstrate increased MMT of R ankle by at least 1/2 grade in order to improve safety and stability with ADLs and functional mobility  LTGs to be achieved upon discharge:   -Pt will be I with HEP in order to continue to improve quality of life and independence and reduce risk for re-injury    -Pt to demonstrate return to activities of daily living without limiations or restrictions    -Pt will return to ambulation > 20 minutes without use of cam boot to help facilitate return to community activities independently   -Pt to demonstrate improved function as noted by achieving or exceeding predicted score on FOTO outcomes assessment tool  Plan  Plan details: PT provided pt with detailed HEP program; pt verbalized understanding of program    Patient would benefit from: skilled physical therapy  Planned modality interventions: cryotherapy  Planned therapy interventions: balance, manual therapy, neuromuscular re-education, therapeutic exercise, therapeutic activities, gait training, functional ROM exercises, flexibility, patient education and home exercise program  Frequency: 2x week  Duration in weeks: 4  Plan of Care beginning date: 2/21/2023  Plan of Care expiration date: 3/21/2023        Subjective Evaluation    History of Present Illness  Mechanism of injury: Pt notes that she was doing gymnastics at home about 1 month ago when she landed on the inside of the R ankle  She notes that she went to ED and x-rays were taken which were (-) for fracture  She was given a CAM boot to wear and discharged   Pt notes she re injured the ankle a 2nd time when the foot was stepped on; she reports feeling a "tearing" sensation in the ankle  Return to ED with x-rays completed again, still remaining (-) for fx  Referral to podiatry who has now referred to OPPT for conservative management of s/s  Return to MD again on 3/10/23  Quality of life: good    Pain  At best pain ratin  At worst pain ratin  Quality: dull ache and needle-like  Relieving factors: medications and ice  Aggravating factors: standing, walking and sitting  Progression: no change    Social Support  Steps to enter house: yes  Stairs in house: yes   Lives with: parents    Employment status: not working    Diagnostic Tests  X-ray: normal  Treatments  Current treatment: physical therapy  Patient Goals  Patient goals for therapy: decreased edema, decreased pain, improved balance, increased motion, increased strength and independence with ADLs/IADLs          Objective     Observations     Right Ankle/Foot   Positive for edema and trophic changes       Neurological Testing     Sensation     Ankle/Foot   Left Ankle/Foot   Intact: light touch    Right Ankle/Foot   Paresthesia: light touch    Active Range of Motion   Left Ankle/Foot   Dorsiflexion (ke): 6 degrees   Plantar flexion: 64 degrees   Inversion: 40 degrees   Eversion: 15 degrees     Right Ankle/Foot   Dorsiflexion (ke): -45 degrees with pain  Plantar flexion: 52 degrees with pain  Inversion: 10 degrees with pain  Eversion: 0 degrees with pain    Passive Range of Motion     Right Ankle/Foot    Dorsiflexion (ke): -30 degrees with pain  Plantar flexion: 54 degrees with pain  Inversion: 12 degrees with pain  Eversion: 2 degrees with pain    Strength/Myotome Testing     Left Ankle/Foot   Normal strength    Right Ankle/Foot   Dorsiflexion: 1  Plantar flexion: 1  Inversion: 1  Eversion: 1    Tests     Additional Tests Details  Testing deferred 2* pain     Ambulation   Weight-Bearing Status   Weight-Bearing Status (Left): weight-bearing as tolerated   Assistive device used: crutches    Additional Weight-Bearing Status Details  Cam boot     Observational Gait   Gait: antalgic   Decreased walking speed                Re-eval Date: 3/21/23     Precautions: WBAT in CAM boot; wean to sneaker starting 3/3/23     Manuals 2/21       R ankle HZ                                Neuro Re-Ed         Steamboats on foam         Bosu lunges         Romberg         Tandem         SLS                         Ther Ex        NuStep        HR/TR        Step-ups   Fwd/Lat        Step-down        Squats         Wobble board seated         Towel in/ev        Tband: ankle all planes         ABCs        Self calf stretch         Isometrics  HEP   5" x 10 each                Ther Activity                        Gait Training                        Modalities        CP prn

## 2023-02-27 ENCOUNTER — OFFICE VISIT (OUTPATIENT)
Dept: PHYSICAL THERAPY | Facility: HOME HEALTHCARE | Age: 18
End: 2023-02-27

## 2023-02-27 DIAGNOSIS — M25.571 ACUTE RIGHT ANKLE PAIN: Primary | ICD-10-CM

## 2023-02-27 DIAGNOSIS — R42 DIZZINESS: ICD-10-CM

## 2023-02-27 DIAGNOSIS — S06.0X0D CONCUSSION WITHOUT LOSS OF CONSCIOUSNESS, SUBSEQUENT ENCOUNTER: ICD-10-CM

## 2023-02-27 DIAGNOSIS — H51.11 CONVERGENCE INSUFFICIENCY: ICD-10-CM

## 2023-02-27 DIAGNOSIS — S93.491A SPRAIN OF ANTERIOR TALOFIBULAR LIGAMENT OF RIGHT ANKLE, INITIAL ENCOUNTER: ICD-10-CM

## 2023-02-27 DIAGNOSIS — G44.319 ACUTE POST-TRAUMATIC HEADACHE, NOT INTRACTABLE: ICD-10-CM

## 2023-02-27 NOTE — PROGRESS NOTES
Daily Note     Today's date: 2023  Patient name: Fina Bañuelos  : 2005  MRN: [id number]  Referring provider: Trinity Betancourt DPM  Dx:   Encounter Diagnosis     ICD-10-CM    1  Acute right ankle pain  M25 571       2  Sprain of anterior talofibular ligament of right ankle, initial encounter  S93 491A       3  Concussion without loss of consciousness, subsequent encounter  S06 0X0D       4  Acute post-traumatic headache, not intractable  G44 319       5  Dizziness  R42       6  Convergence insufficiency  H51 11           Start Time: 980  Stop Time: 1450  Total time in clinic (min): 35 minutes    Subjective: Patient arrives in BOOT with bilateral crutches  She reports she has been icing, elevating and doing isometrics at home  She reports it takes a long time to get through them and she is in a lot of soreness after  She reports she has been sleeping with the boot on because of the pain  Objective: See treatment diary below      Assessment: Tolerated treatment poor  Moderate swelling to surrounding ankle lateral > medial and dorsal foot  she is very tender with palpation and gentle manual PROM  Trace AROM demonstrated today however some good muscle memory carryover seen with increased reps; increased surrounding soreness noted with increased reps  reviewed HEP and icing positions for improved tolerance; good understanding  Patient would benefit from continued PT to improve pain, ROM and strength as tolerated  Plan: Progress treatment as tolerated     (consider addition of SLRx3, LAQ for next session if appropriate)     Re-eval Date: 3/21/23    Precautions: WBAT in CAM boot; wean to sneaker starting 3/3/23     Manuals       R ankle HZ  KL gentle PROM painful                              Neuro Re-Ed         Steamboats on foam         Bosu lunges         Romberg         Tandem         SLS                         Ther Ex        NuStep        HR/TR        Step-ups   Fwd/Lat Step-down        Squats         Wobble board seated         Towel in/ev        Tband: ankle all planes         ABCs        Self calf stretch   GENTLE 5"x5      Isometrics  HEP   5" x 10 each  Reviewed       Ankle AROM    Towel curls  10' w/breaks    3'      Ther Activity                        Gait Training                        Modalities        CP prn

## 2023-03-02 ENCOUNTER — OFFICE VISIT (OUTPATIENT)
Dept: PHYSICAL THERAPY | Facility: HOME HEALTHCARE | Age: 18
End: 2023-03-02

## 2023-03-02 DIAGNOSIS — S93.491A SPRAIN OF ANTERIOR TALOFIBULAR LIGAMENT OF RIGHT ANKLE, INITIAL ENCOUNTER: ICD-10-CM

## 2023-03-02 DIAGNOSIS — R42 DIZZINESS: ICD-10-CM

## 2023-03-02 DIAGNOSIS — M25.571 ACUTE RIGHT ANKLE PAIN: Primary | ICD-10-CM

## 2023-03-02 DIAGNOSIS — S06.0X0D CONCUSSION WITHOUT LOSS OF CONSCIOUSNESS, SUBSEQUENT ENCOUNTER: ICD-10-CM

## 2023-03-02 DIAGNOSIS — H51.11 CONVERGENCE INSUFFICIENCY: ICD-10-CM

## 2023-03-02 DIAGNOSIS — G44.319 ACUTE POST-TRAUMATIC HEADACHE, NOT INTRACTABLE: ICD-10-CM

## 2023-03-02 NOTE — PROGRESS NOTES
Daily Note     Today's date: 3/2/2023  Patient name: Gregorio Cervantes  : 2005  MRN: [id number]  Referring provider: Loise Favre, DPM  Dx:   Encounter Diagnosis     ICD-10-CM    1  Acute right ankle pain  M25 571       2  Sprain of anterior talofibular ligament of right ankle, initial encounter  S93 491A       3  Concussion without loss of consciousness, subsequent encounter  S06 0X0D       4  Acute post-traumatic headache, not intractable  G44 319       5  Dizziness  R42       6  Convergence insufficiency  H51 11           Start Time: 1430  Stop Time: 1515  Total time in clinic (min): 45 minutes    Subjective: Patient arrives in BOOT with bilateral crutches  She reports she was able to rest her foot for two days but the exercises at home are still painful verses sore  Objective: See treatment diary below      Assessment: Tolerated treatment fair and improved since last session  She continues with moderate surrounding ankle/foot swelling but decreased tenderness with palpation today  Overall she had good improvements in tolerated during exercises this session verses last  Improved visual muscle activation and mobility today  Trace AROM demonstrated today however some good muscle memory carryover seen with increased reps; increased surrounding soreness noted with increased reps  Patient would benefit from continued PT to improve pain, ROM and strength as tolerated  Plan: Progress treatment as tolerated          Re-eval Date: 3/21/23    Precautions: WBAT in CAM boot; wean to sneaker starting 3/3/23     Manuals 2/21 2/27 3/2     R ankle HZ  KL gentle PROM painful KL Gentle PROM- painful                             Neuro Re-Ed         Steamboats on foam         Ricciu lungxander         Romberg         Tandem         SLS                         Ther Ex        NuStep        HR/TR        Step-ups   Fwd/Lat        Step-down        Squats         Wobble board seated         Towel in/ev        Tband: ankle all planes    SLRx3 2x10 ea      ABCs        Self calf stretch   GENTLE 5"x5      Isometrics  HEP   5" x 10 each  Reviewed  2" x10 ea w/ball     Ankle AROM    Towel curls  10' w/breaks    3' Seated- HR 2'  Seated- TR 2'  Seated-Toe curls 2'  Seated- EV 2'  Seated-IV 2'    Intermittent breaks between ex's     Ther Activity                        Gait Training                        Modalities        CP prn    10' post

## 2023-03-06 ENCOUNTER — APPOINTMENT (OUTPATIENT)
Dept: PHYSICAL THERAPY | Facility: HOME HEALTHCARE | Age: 18
End: 2023-03-06

## 2023-03-06 ENCOUNTER — OFFICE VISIT (OUTPATIENT)
Dept: PHYSICAL THERAPY | Facility: HOME HEALTHCARE | Age: 18
End: 2023-03-06

## 2023-03-06 DIAGNOSIS — S06.0X0D CONCUSSION WITHOUT LOSS OF CONSCIOUSNESS, SUBSEQUENT ENCOUNTER: ICD-10-CM

## 2023-03-06 DIAGNOSIS — G44.319 ACUTE POST-TRAUMATIC HEADACHE, NOT INTRACTABLE: ICD-10-CM

## 2023-03-06 DIAGNOSIS — R42 DIZZINESS: ICD-10-CM

## 2023-03-06 DIAGNOSIS — M25.571 ACUTE RIGHT ANKLE PAIN: Primary | ICD-10-CM

## 2023-03-06 DIAGNOSIS — S93.491A SPRAIN OF ANTERIOR TALOFIBULAR LIGAMENT OF RIGHT ANKLE, INITIAL ENCOUNTER: ICD-10-CM

## 2023-03-06 DIAGNOSIS — H51.11 CONVERGENCE INSUFFICIENCY: ICD-10-CM

## 2023-03-06 NOTE — PROGRESS NOTES
Daily Note     Today's date: 3/6/2023  Patient name: Joselo Reyes  : 2005  MRN: [id number]  Referring provider: Claudia Abdi DPM  Dx:   Encounter Diagnosis     ICD-10-CM    1  Acute right ankle pain  M25 571       2  Sprain of anterior talofibular ligament of right ankle, initial encounter  S93 491A       3  Concussion without loss of consciousness, subsequent encounter  S06 0X0D       4  Acute post-traumatic headache, not intractable  G44 319       5  Dizziness  R42       6  Convergence insufficiency  H51 11           Start Time: 1430  Stop Time: 1515  Total time in clinic (min): 45 minutes    Subjective: Patient arrives in BOOT with bilateral crutches  She reports she fell up her stairs yesterday with her boot on when she lost her balance with her crutches  She reports having increased pain since  Objective: See treatment diary below      Assessment:  Session was focused on seated exercises to tolerance  She was able to progress with wobble board and alphabet and overall demonstrated visual improvements in mobility and muscle recruitment this session verses last  She continues to very slowly improve each session however at slow rate  She continues with tenderness to surrounding ankle and mild surrounding swelling  Patient would benefit from continued PT to improve pain, ROM and strength as tolerated  Plan: Progress treatment as tolerated          Re-eval Date: 3/21/23    Precautions: WBAT in CAM boot; wean to sneaker starting 3/3/23     Manuals 2/21 2/27 3/2 3    R ankle HZ  KL gentle PROM painful KL Gentle PROM- painful Not Today                            Neuro Re-Ed         Steamboats on foam         Bosu lunges         Romberg         Tandem         SLS                         Ther Ex        NuStep        HR/TR        Step-ups   Fwd/Lat        Step-down        Squats         Wobble board seated     DF/PF 2'    Towel in/ev        Tband: ankle all planes    SLRx3 2x10 ea  SLRx3 20x ea; LAQ 20x    ABCs    2x    Self calf stretch   GENTLE 5"x5      Isometrics  HEP   5" x 10 each  Reviewed  2" x10 ea w/ball     Ankle AROM    Towel curls  10' w/breaks    3' Seated- HR 2'  Seated- TR 2'  Seated-Toe curls 2'  Seated- EV 2'  Seated-IV 2'    Intermittent breaks between ex's Seated- HR 2'  Seated- TR 2'  Seated-Toe curls 2'  Seated- EV 2'  Seated-IV 2'    Intermittent breaks between ex's    Ther Activity                        Gait Training                        Modalities        CP prn    10' post Deferred/will do at home

## 2023-03-09 ENCOUNTER — APPOINTMENT (OUTPATIENT)
Dept: PHYSICAL THERAPY | Facility: HOME HEALTHCARE | Age: 18
End: 2023-03-09

## 2023-03-11 ENCOUNTER — OFFICE VISIT (OUTPATIENT)
Dept: URGENT CARE | Facility: CLINIC | Age: 18
End: 2023-03-11

## 2023-03-11 VITALS
BODY MASS INDEX: 23 KG/M2 | HEART RATE: 59 BPM | RESPIRATION RATE: 18 BRPM | HEIGHT: 62 IN | SYSTOLIC BLOOD PRESSURE: 99 MMHG | TEMPERATURE: 98.5 F | OXYGEN SATURATION: 98 % | WEIGHT: 125 LBS | DIASTOLIC BLOOD PRESSURE: 50 MMHG

## 2023-03-11 DIAGNOSIS — L03.012 PARONYCHIA OF FINGER OF LEFT HAND: Primary | ICD-10-CM

## 2023-03-11 RX ORDER — CEPHALEXIN 500 MG/1
500 CAPSULE ORAL EVERY 6 HOURS SCHEDULED
Qty: 28 CAPSULE | Refills: 0 | Status: SHIPPED | OUTPATIENT
Start: 2023-03-11 | End: 2023-03-18

## 2023-03-11 NOTE — PROGRESS NOTES
[facility]        NAME: Marc Nino is a 21 y o  female  : 2005    MRN: [id number]  DATE: 2023  TIME: 3:06 PM      Assessment and Plan     Paronychia of finger of left hand [L03 012]  1  Paronychia of finger of left hand  cephalexin (KEFLEX) 500 mg capsule            Patient Instructions     Patient Instructions   Paronychia  WHAT YOU NEED TO KNOW:   What is paronychia? Paronychia is an infection of your nail fold caused by bacteria or a fungus  The nail fold is the skin around your nail  Paronychia may happen suddenly and last for 6 weeks or longer  You may have paronychia on more than 1 finger or toe  What increases my risk for paronychia? • Trauma:  Any injury that causes your skin to tear can lead to infection  Your risk is increased if you have ingrown nails, bite your nails, or wear acrylic nails  • Frequent contact with water:  Jobs that require you to soak your hands in water often may increase your risk for paronychia  Common examples are nurses, cooks, and   Swimmers also have increased risk  • Medical conditions:  Diabetes and other conditions that cause a weak immune system can increase your risk  Some examples are skin cancer, psoriasis, HIV, and lupus  • Chemicals:  Contact with soaps, detergents, and other chemicals can cause inflammation and lead to paronychia  • Allergies: Allergies to certain foods, nail polish, or latex can cause inflammation and increase your risk  What are the signs and symptoms of paronychia? • Red, hot, swollen, painful nail fold    • Pus coming out of your nail fold when you press on it    • Nail that pulls away from your nail fold and may fall off    • Changes in nail color, such as green nails    • Fever    • Thick, rough nail, or ridges in the nail    How is paronychia diagnosed? Your healthcare provider will examine your nails and ask about your symptoms   Your provider may press on your infected nail to see if pus drains from it  Your provider will send any pus to a lab for tests to learn what germ is causing your infection  This is called a fluid culture  How is paronychia treated? 1  Medicine:     ? Td vaccine  is a booster shot used to help prevent tetanus and diphtheria  The Td booster may be given to adolescents and adults every 10 years or for certain wounds and injuries  ? Antibiotics: This medicine will help fight or prevent an infection caused by bacteria  It may be given as a pill, cream, or ointment  ? Steroids: This medicine will help decrease inflammation  It may be given as a pill, cream, or ointment  ? Antifungal medicine: This medicine helps kill fungus that may be causing your infection  It may be given as a cream or ointment  ? NSAIDs:  These medicines decrease pain and swelling  NSAIDs are available without a doctor's order  Ask your healthcare provider which medicine is right for you  Ask how much to take and when to take it  Take as directed  NSAIDs can cause stomach bleeding and kidney problems if not taken correctly  2  Procedures: You may need surgery to drain an abscess (pus pocket) in your finger or toe  Your nail may need to be removed  Infected tissue around your nail may also need to be removed  What are the risks of paronychia? Your nail may become loose, deformed, or fall off  The infection may form a large abscess on your nail  The infection may spread to nearby tissue and bone  How can paronychia be prevented? • Avoid chemicals and allergens that may harm your skin and nails  This includes soaps, laundry detergents, and nail products  • Keep your nails clean and dry  Do not soak your nails in water  Use cotton-lined rubber gloves or wear 2 rubber gloves if you work with food or water  The gloves will help protect your nail folds  • Keep your nails short  Do not bite your nails, pick at your hangnails, suck your fingers, or wear fake nails   Bring your own nail tools when you go to the nail salon  How can I manage my symptoms? • Soak your nail:  Soak your nail in a mixture of equal parts vinegar and water 3 or 4 times each day  This will help decrease inflammation  • Apply a warm compress:  Soak a washcloth in warm water and place it on your nail  This will help decrease inflammation  • Elevate:  Raise your nail above the level of your heart as often as you can  This will help decrease swelling and pain  Prop your nail on pillows or blankets to keep it elevated comfortably  • Use lotion:  Apply lotion after you wash your hands  This will prevent the skin from becoming too dry  When should I seek immediate care? • You have severe nail pain  • The inflammation spreads to your hand or arm  When should I call my doctor? • Your nail becomes loose, deformed, or falls off  • You have a large abscess on your nail  • You have questions or concerns about your condition or care  CARE AGREEMENT:   You have the right to help plan your care  Learn about your health condition and how it may be treated  Discuss treatment options with your healthcare providers to decide what care you want to receive  You always have the right to refuse treatment  The above information is an  only  It is not intended as medical advice for individual conditions or treatments  Talk to your doctor, nurse or pharmacist before following any medical regimen to see if it is safe and effective for you  © Copyright [employer] 2022 Information is for End User's use only and may not be sold, redistributed or otherwise used for commercial purposes  Follow up with PCP in 3-5 days  Proceed to  ER if symptoms worsen      Chief Complaint     Chief Complaint   Patient presents with   • Nail Problem     Ripped left 5 th digit finger nail off x 2 days          History of Present Illness     A friend was helping her remove acrylic nails 2 days ago when patient's left pinky nail was accidentally ripped off  The area is sore and the skin around it is getting more pink and warm compared to her other digits  Review of Systems     Review of Systems   Musculoskeletal: Negative for arthralgias  Skin: Positive for color change and wound  All other systems reviewed and are negative  Current Medications       Current Outpatient Medications:   •  albuterol (PROVENTIL HFA,VENTOLIN HFA) 90 mcg/act inhaler, Inhale 2 puffs every 6 (six) hours as needed for wheezing  , Disp: , Rfl:   •  cephalexin (KEFLEX) 500 mg capsule, Take 1 capsule (500 mg total) by mouth every 6 (six) hours for 7 days, Disp: 28 capsule, Rfl: 0  •  ergocalciferol (VITAMIN D2) 50,000 units, TAKE 1 CAPSULE BY MOUTH ONE TIME PER WEEK, Disp: , Rfl:   •  ferrous sulfate 325 (65 Fe) mg tablet, , Disp: , Rfl:   •  methocarbamol (ROBAXIN) 500 mg tablet, TAKE 1 TABLET BY MOUTH 4 TIMES A DAY AS NEEDED FOR MUSCLE SPASMS , Disp: , Rfl:   •  naproxen (NAPROSYN) 500 mg tablet, TAKE 1 TABLET BY MOUTH 2 TIMES A DAY AS NEEDED FOR MODERATE PAIN (PAIN SCORE 4-6)   TAKE WITH MEALS , Disp: , Rfl:   •  ondansetron (Zofran ODT) 4 mg disintegrating tablet, Take 1 tablet (4 mg total) by mouth every 6 (six) hours as needed for nausea or vomiting, Disp: 16 tablet, Rfl: 0  •  Xulane 150-35 MCG/24HR, APPLY 1 PATCH ONE TIME PER WEEK (Patient not taking: Reported on 3/11/2023), Disp: , Rfl:     Current Allergies     Allergies as of 03/11/2023 - Reviewed 03/11/2023   Allergen Reaction Noted   • Latex Rash 04/12/2019              The following portions of the patient's history were reviewed and updated as appropriate: allergies, current medications, past family history, past medical history, past social history, past surgical history and problem list      Past Medical History:   Diagnosis Date   • Anxiety    • Asthma    • Back pain    • Depression    • POTS (postural orthostatic tachycardia syndrome)        Past Surgical History:   Procedure Laterality Date   • TONSILLECTOMY     • TONSILLECTOMY         Family History   Problem Relation Age of Onset   • Bipolar disorder Mother    • Scoliosis Mother    • Allergy (severe) Sister    • Kidney disease Brother    • Breast cancer Maternal Aunt    • Diabetes Maternal Grandmother          Medications have been verified  Objective     BP 99/50   Pulse 59   Temp 98 5 °F (36 9 °C)   Resp 18   Ht 5' 2" (1 575 m)   Wt 56 7 kg (125 lb)   SpO2 98%   BMI 22 86 kg/m²   No LMP recorded  Physical Exam     Physical Exam  Vitals and nursing note reviewed  Constitutional:       General: She is not in acute distress  Appearance: Normal appearance  She is well-developed  She is not ill-appearing, toxic-appearing or diaphoretic  HENT:      Head: Normocephalic and atraumatic  Eyes:      Pupils: Pupils are equal, round, and reactive to light  Pulmonary:      Effort: Pulmonary effort is normal  No respiratory distress  Abdominal:      General: There is no distension  Palpations: Abdomen is soft  Musculoskeletal:         General: Normal range of motion  Cervical back: Normal range of motion and neck supple  Skin:     General: Skin is warm and dry  Capillary Refill: Capillary refill takes less than 2 seconds  Findings: Erythema (mild near left pinky nail that is missing) and wound (fairly clean wound bed of left pinky nail  Nail bed itself intact and undamanged) present  Neurological:      General: No focal deficit present  Mental Status: She is alert and oriented to person, place, and time  Psychiatric:         Mood and Affect: Mood normal          Behavior: Behavior normal          Thought Content:  Thought content normal          Judgment: Judgment normal

## 2023-03-11 NOTE — PATIENT INSTRUCTIONS
Paronychia  WHAT YOU NEED TO KNOW:   What is paronychia? Paronychia is an infection of your nail fold caused by bacteria or a fungus  The nail fold is the skin around your nail  Paronychia may happen suddenly and last for 6 weeks or longer  You may have paronychia on more than 1 finger or toe  What increases my risk for paronychia? Trauma:  Any injury that causes your skin to tear can lead to infection  Your risk is increased if you have ingrown nails, bite your nails, or wear acrylic nails  Frequent contact with water:  Jobs that require you to soak your hands in water often may increase your risk for paronychia  Common examples are nurses, cooks, and   Swimmers also have increased risk  Medical conditions:  Diabetes and other conditions that cause a weak immune system can increase your risk  Some examples are skin cancer, psoriasis, HIV, and lupus  Chemicals:  Contact with soaps, detergents, and other chemicals can cause inflammation and lead to paronychia  Allergies: Allergies to certain foods, nail polish, or latex can cause inflammation and increase your risk  What are the signs and symptoms of paronychia? Red, hot, swollen, painful nail fold    Pus coming out of your nail fold when you press on it    Nail that pulls away from your nail fold and may fall off    Changes in nail color, such as green nails    Fever    Thick, rough nail, or ridges in the nail    How is paronychia diagnosed? Your healthcare provider will examine your nails and ask about your symptoms  Your provider may press on your infected nail to see if pus drains from it  Your provider will send any pus to a lab for tests to learn what germ is causing your infection  This is called a fluid culture  How is paronychia treated? Medicine:     Td vaccine  is a booster shot used to help prevent tetanus and diphtheria   The Td booster may be given to adolescents and adults every 10 years or for certain wounds and injuries  Antibiotics: This medicine will help fight or prevent an infection caused by bacteria  It may be given as a pill, cream, or ointment  Steroids: This medicine will help decrease inflammation  It may be given as a pill, cream, or ointment  Antifungal medicine: This medicine helps kill fungus that may be causing your infection  It may be given as a cream or ointment  NSAIDs:  These medicines decrease pain and swelling  NSAIDs are available without a doctor's order  Ask your healthcare provider which medicine is right for you  Ask how much to take and when to take it  Take as directed  NSAIDs can cause stomach bleeding and kidney problems if not taken correctly  Procedures: You may need surgery to drain an abscess (pus pocket) in your finger or toe  Your nail may need to be removed  Infected tissue around your nail may also need to be removed  What are the risks of paronychia? Your nail may become loose, deformed, or fall off  The infection may form a large abscess on your nail  The infection may spread to nearby tissue and bone  How can paronychia be prevented? Avoid chemicals and allergens that may harm your skin and nails  This includes soaps, laundry detergents, and nail products  Keep your nails clean and dry  Do not soak your nails in water  Use cotton-lined rubber gloves or wear 2 rubber gloves if you work with food or water  The gloves will help protect your nail folds  Keep your nails short  Do not bite your nails, pick at your hangnails, suck your fingers, or wear fake nails  Bring your own nail tools when you go to the nail salon  How can I manage my symptoms? Soak your nail:  Soak your nail in a mixture of equal parts vinegar and water 3 or 4 times each day  This will help decrease inflammation  Apply a warm compress:  Soak a washcloth in warm water and place it on your nail  This will help decrease inflammation       Elevate:  Raise your nail above the level of your heart as often as you can  This will help decrease swelling and pain  Prop your nail on pillows or blankets to keep it elevated comfortably  Use lotion:  Apply lotion after you wash your hands  This will prevent the skin from becoming too dry  When should I seek immediate care? You have severe nail pain  The inflammation spreads to your hand or arm  When should I call my doctor? Your nail becomes loose, deformed, or falls off  You have a large abscess on your nail  You have questions or concerns about your condition or care  CARE AGREEMENT:   You have the right to help plan your care  Learn about your health condition and how it may be treated  Discuss treatment options with your healthcare providers to decide what care you want to receive  You always have the right to refuse treatment  The above information is an  only  It is not intended as medical advice for individual conditions or treatments  Talk to your doctor, nurse or pharmacist before following any medical regimen to see if it is safe and effective for you  © Copyright Liv Navarro 2022 Information is for End User's use only and may not be sold, redistributed or otherwise used for commercial purposes

## 2023-03-13 ENCOUNTER — OFFICE VISIT (OUTPATIENT)
Dept: PHYSICAL THERAPY | Facility: HOME HEALTHCARE | Age: 18
End: 2023-03-13

## 2023-03-13 DIAGNOSIS — M25.571 ACUTE RIGHT ANKLE PAIN: Primary | ICD-10-CM

## 2023-03-13 DIAGNOSIS — S93.491A SPRAIN OF ANTERIOR TALOFIBULAR LIGAMENT OF RIGHT ANKLE, INITIAL ENCOUNTER: ICD-10-CM

## 2023-03-13 NOTE — PROGRESS NOTES
Daily Note     Today's date: 3/13/2023  Patient name: Mara Siegel  : 2005  MRN: [id number]  Referring provider: Jose A Márquez DPM  Dx:   Encounter Diagnosis     ICD-10-CM    1  Acute right ankle pain  M25 571       2  Sprain of anterior talofibular ligament of right ankle, initial encounter  S93 491A           Start Time: 1435  Stop Time: 1508  Total time in clinic (min): 33 minutes    Subjective: Pt reports that her ankle is feeling okay and that she started walking without her crutches yesterday  Objective: See treatment diary below      Assessment: Pt demonstrated good tolerance to treatment session  She does continue to demonstrate significant R ankle weakness, but has been improving since beginning PT  Added TB 4-way today in order to further progress strengthening  Pt also has moderate R ankle DF ROM deficits due to gastroc tightness, but improved with passive stretching  Pt would benefit from continued PT  Plan: Continue per plan of care        Re-eval Date: 3/21/23    Precautions: WBAT in CAM boot; wean to sneaker starting 3/3/23     Manuals 2/21 2/27 3/2 3/6 3/13   R ankle HZ  KL gentle PROM painful KL Gentle PROM- painful Not Today 10' focusing on calf stretching                           Neuro Re-Ed         Steamboats on foam         Bosu lunges         Romberg         Tandem         SLS                         Ther Ex        NuStep        HR/TR        Step-ups   Fwd/Lat        Step-down        Squats         Wobble board seated     DF/PF 2' DF/PF 2'   Towel in/ev        SLR        LAQ     x20 ea   Tband: ankle all planes    SLRx3 2x10 ea  SLRx3 20x ea; LAQ 20x Yellow x20 ea   ABCs    2x 2x   Self calf stretch   GENTLE 5"x5      Isometrics  HEP   5" x 10 each  Reviewed  2" x10 ea w/ball     Ankle AROM    Towel curls  10' w/breaks    3' Seated- HR 2'  Seated- TR 2'  Seated-Toe curls 2'  Seated- EV 2'  Seated-IV 2'    Intermittent breaks between ex's Seated- HR 2'  Seated- TR 2'  Seated-Toe curls 2'  Seated- EV 2'  Seated-IV 2'    Intermittent breaks between ex's Seated- HR x20  Seated- TR x20  Seated-Toe curls 2'  Seated- EV 2'  Seated-IV 2'   Ther Activity                        Gait Training                        Modalities        CP prn    10' post Deferred/will do at home Deferred/will do at home

## 2023-03-16 ENCOUNTER — APPOINTMENT (OUTPATIENT)
Dept: PHYSICAL THERAPY | Facility: HOME HEALTHCARE | Age: 18
End: 2023-03-16

## 2023-03-20 ENCOUNTER — APPOINTMENT (OUTPATIENT)
Dept: PHYSICAL THERAPY | Facility: HOME HEALTHCARE | Age: 18
End: 2023-03-20

## 2023-03-23 ENCOUNTER — APPOINTMENT (OUTPATIENT)
Dept: PHYSICAL THERAPY | Facility: HOME HEALTHCARE | Age: 18
End: 2023-03-23

## 2023-04-17 PROBLEM — M79.671 RIGHT FOOT PAIN: Status: ACTIVE | Noted: 2023-04-17

## 2023-04-24 ENCOUNTER — OFFICE VISIT (OUTPATIENT)
Dept: URGENT CARE | Facility: CLINIC | Age: 18
End: 2023-04-24

## 2023-04-24 VITALS
TEMPERATURE: 98.6 F | BODY MASS INDEX: 22.86 KG/M2 | RESPIRATION RATE: 18 BRPM | HEART RATE: 60 BPM | SYSTOLIC BLOOD PRESSURE: 114 MMHG | OXYGEN SATURATION: 98 % | DIASTOLIC BLOOD PRESSURE: 56 MMHG | WEIGHT: 125 LBS

## 2023-04-24 DIAGNOSIS — J01.00 ACUTE NON-RECURRENT MAXILLARY SINUSITIS: Primary | ICD-10-CM

## 2023-04-24 DIAGNOSIS — R05.1 ACUTE COUGH: ICD-10-CM

## 2023-04-24 DIAGNOSIS — J02.9 SORETHROAT: ICD-10-CM

## 2023-04-24 LAB — S PYO AG THROAT QL: NEGATIVE

## 2023-04-24 RX ORDER — AMOXICILLIN AND CLAVULANATE POTASSIUM 875; 125 MG/1; MG/1
1 TABLET, FILM COATED ORAL EVERY 12 HOURS SCHEDULED
Qty: 14 TABLET | Refills: 0 | Status: SHIPPED | OUTPATIENT
Start: 2023-04-24 | End: 2023-05-01

## 2023-04-24 NOTE — PROGRESS NOTES
[facility]        NAME: Tony Garcíasin is a 21 y o  female  : 2005    MRN: [id number]  DATE: 2023  TIME: 4:38 PM    Assessment and Plan   Acute non-recurrent maxillary sinusitis [J01 00]  1  Acute non-recurrent maxillary sinusitis  amoxicillin-clavulanate (AUGMENTIN) 875-125 mg per tablet      2  Sorethroat  POCT rapid strepA      3  Acute cough          POCT rapid strep: negative    Patient Instructions     Start Augmentin as prescribed  Continue the use of albuterol inhaler  Salt water gargles  Vitamin D3 2000 IU daily  Vitamin C 1000mg twice per day  Multivitamin daily  Fluids and rest  Over the counter cold medication as needed (EX: Mucinex, tylenol/motrin)  Follow up with PCP in 3-5 days  Proceed to ER if symptoms worsen  Eat yogurt with live and active cultures and/or take a probiotic at least 3 hours before or after antibiotic dose  Monitor stool for diarrhea and/or blood  If this occurs, contact primary care doctor ASAP  Chief Complaint     Chief Complaint   Patient presents with   • Sore Throat   • Cold Like Symptoms     X 3 days         History of Present Illness       Patient is a 20 yo female with significant PMH of asthma presenting in the clinic today for cold sx x 2 days  Admits productive cough, sore throat, right ear pain/fullness, headache, congestion, sinus pain/pressure, rhinorrhea, chest tightness,and wheezing  Denies fever, chills, chest pain, SOB, abdominal pain, n/v/d  Admits the use of Benadryl, DayQuil, albuterol inhaler, and Mucus relief for symptom management  Denies recent sick contacts  Review of Systems   Review of Systems   Constitutional: Negative for chills, fatigue and fever  HENT: Positive for congestion, ear pain, rhinorrhea, sinus pressure and sore throat  Negative for postnasal drip and sinus pain  Respiratory: Positive for cough, chest tightness and wheezing  Negative for shortness of breath      Cardiovascular: Negative for chest pain  Gastrointestinal: Negative for abdominal pain, diarrhea, nausea and vomiting  Musculoskeletal: Negative for myalgias  Skin: Negative for rash  Neurological: Positive for headaches  Current Medications       Current Outpatient Medications:   •  albuterol (PROVENTIL HFA,VENTOLIN HFA) 90 mcg/act inhaler, Inhale 2 puffs every 6 (six) hours as needed for wheezing  , Disp: , Rfl:   •  amoxicillin-clavulanate (AUGMENTIN) 875-125 mg per tablet, Take 1 tablet by mouth every 12 (twelve) hours for 7 days, Disp: 14 tablet, Rfl: 0  •  ergocalciferol (VITAMIN D2) 50,000 units, TAKE 1 CAPSULE BY MOUTH ONE TIME PER WEEK, Disp: , Rfl:   •  methocarbamol (ROBAXIN) 500 mg tablet, TAKE 1 TABLET BY MOUTH 4 TIMES A DAY AS NEEDED FOR MUSCLE SPASMS , Disp: , Rfl:   •  naproxen (NAPROSYN) 500 mg tablet, TAKE 1 TABLET BY MOUTH 2 TIMES A DAY AS NEEDED FOR MODERATE PAIN (PAIN SCORE 4-6)   TAKE WITH MEALS , Disp: , Rfl:   •  ferrous sulfate 325 (65 Fe) mg tablet, , Disp: , Rfl:   •  ondansetron (Zofran ODT) 4 mg disintegrating tablet, Take 1 tablet (4 mg total) by mouth every 6 (six) hours as needed for nausea or vomiting, Disp: 16 tablet, Rfl: 0  •  Xulane 150-35 MCG/24HR, APPLY 1 PATCH ONE TIME PER WEEK (Patient not taking: Reported on 3/11/2023), Disp: , Rfl:     Current Allergies     Allergies as of 04/24/2023 - Reviewed 04/24/2023   Allergen Reaction Noted   • Latex Rash 04/12/2019            The following portions of the patient's history were reviewed and updated as appropriate: allergies, current medications, past family history, past medical history, past social history, past surgical history and problem list      Past Medical History:   Diagnosis Date   • Anxiety    • Asthma    • Back pain    • Depression    • POTS (postural orthostatic tachycardia syndrome)        Past Surgical History:   Procedure Laterality Date   • TONSILLECTOMY     • TONSILLECTOMY         Family History   Problem Relation Age of Onset • Bipolar disorder Mother    • Scoliosis Mother    • Allergy (severe) Sister    • Kidney disease Brother    • Breast cancer Maternal Aunt    • Diabetes Maternal Grandmother          Medications have been verified  Objective   /56   Pulse 60   Temp 98 6 °F (37 °C)   Resp 18   Wt 56 7 kg (125 lb)   SpO2 98%   BMI 22 86 kg/m²        Physical Exam     Physical Exam  Vitals reviewed  Constitutional:       General: She is not in acute distress  Appearance: Normal appearance  She is normal weight  She is not ill-appearing  HENT:      Head: Normocephalic  Right Ear: Hearing, tympanic membrane, ear canal and external ear normal  No middle ear effusion  There is no impacted cerumen  Tympanic membrane is not erythematous or bulging  Left Ear: Hearing, tympanic membrane, ear canal and external ear normal   No middle ear effusion  There is no impacted cerumen  Tympanic membrane is not erythematous or bulging  Nose: Congestion present  No rhinorrhea  Right Sinus: Maxillary sinus tenderness present  No frontal sinus tenderness  Left Sinus: Maxillary sinus tenderness present  No frontal sinus tenderness  Mouth/Throat:      Lips: Pink  Mouth: Mucous membranes are moist       Pharynx: Uvula midline  No pharyngeal swelling, oropharyngeal exudate, posterior oropharyngeal erythema or uvula swelling  Tonsils: 0 on the right  0 on the left  Comments: H/o tonsillectomy  Eyes:      Conjunctiva/sclera: Conjunctivae normal    Cardiovascular:      Rate and Rhythm: Normal rate and regular rhythm  Pulses: Normal pulses  Heart sounds: Normal heart sounds  No murmur heard  No friction rub  No gallop  Pulmonary:      Effort: Pulmonary effort is normal       Breath sounds: Normal breath sounds  No wheezing, rhonchi or rales  Musculoskeletal:      Cervical back: Normal range of motion and neck supple  No tenderness     Lymphadenopathy:      Cervical: No cervical adenopathy  Skin:     General: Skin is warm  Neurological:      Mental Status: She is alert     Psychiatric:         Mood and Affect: Mood normal          Behavior: Behavior normal

## 2023-04-24 NOTE — PATIENT INSTRUCTIONS
Start Augmentin as prescribed  Continue the use of albuterol inhaler  Salt water gargles  Vitamin D3 2000 IU daily  Vitamin C 1000mg twice per day  Multivitamin daily  Fluids and rest  Over the counter cold medication as needed (EX: Mucinex, tylenol/motrin)  Follow up with PCP in 3-5 days  Proceed to ER if symptoms worsen  Eat yogurt with live and active cultures and/or take a probiotic at least 3 hours before or after antibiotic dose  Monitor stool for diarrhea and/or blood  If this occurs, contact primary care doctor ASAP

## 2023-04-26 ENCOUNTER — HOSPITAL ENCOUNTER (EMERGENCY)
Facility: HOSPITAL | Age: 18
Discharge: HOME | End: 2023-04-26
Attending: EMERGENCY MEDICINE

## 2023-04-26 ENCOUNTER — APPOINTMENT (EMERGENCY)
Dept: RADIOLOGY | Facility: HOSPITAL | Age: 18
End: 2023-04-26

## 2023-04-26 VITALS
HEART RATE: 78 BPM | OXYGEN SATURATION: 98 % | RESPIRATION RATE: 18 BRPM | DIASTOLIC BLOOD PRESSURE: 56 MMHG | SYSTOLIC BLOOD PRESSURE: 118 MMHG | TEMPERATURE: 97.8 F

## 2023-04-26 DIAGNOSIS — J32.9 SINUSITIS: ICD-10-CM

## 2023-04-26 DIAGNOSIS — J06.9 URI (UPPER RESPIRATORY INFECTION): Primary | ICD-10-CM

## 2023-04-26 RX ORDER — ALBUTEROL SULFATE 2.5 MG/3ML
5 SOLUTION RESPIRATORY (INHALATION) ONCE
Status: COMPLETED | OUTPATIENT
Start: 2023-04-26 | End: 2023-04-26

## 2023-04-26 RX ORDER — ALBUTEROL SULFATE 90 UG/1
2 AEROSOL, METERED RESPIRATORY (INHALATION) ONCE
Status: COMPLETED | OUTPATIENT
Start: 2023-04-26 | End: 2023-04-26

## 2023-04-26 RX ADMIN — ALBUTEROL SULFATE 5 MG: 2.5 SOLUTION RESPIRATORY (INHALATION) at 21:35

## 2023-04-26 RX ADMIN — ALBUTEROL SULFATE 2 PUFF: 90 AEROSOL, METERED RESPIRATORY (INHALATION) at 22:26

## 2023-04-27 NOTE — ED PROVIDER NOTES
History  Chief Complaint   Patient presents with    Nasal Congestion     Patient has had nasal congestion for three days went to  and they said to come here for a chest xray bc she is asthmatic and said when she lays down is short of breath     66-year-old female presents for evaluation of persistent cold symptoms, dyspnea  Reports symptoms have been ongoing for the last few days, was seen at urgent care on 4/24/2023 and diagnosed with sinusitis and placed on Augmentin for which she has been taking  Patient reports somewhat improved congestion however persistent facial pain, dry cough  Patient does have history of asthma and has been using relief inhaler as needed  She denies fevers or chills, reports she is tolerating oral intake  She denies cardiac history in herself, history of VTE, lower extremity swelling or pain, OCP use  Prior to Admission Medications   Prescriptions Last Dose Informant Patient Reported? Taking? Xulane 150-35 MCG/24HR   Yes No   Sig: APPLY 1 PATCH ONE TIME PER WEEK   Patient not taking: Reported on 3/11/2023   albuterol (PROVENTIL HFA,VENTOLIN HFA) 90 mcg/act inhaler 4/26/2023  Yes Yes   Sig: Inhale 2 puffs every 6 (six) hours as needed for wheezing     amoxicillin-clavulanate (AUGMENTIN) 875-125 mg per tablet 4/26/2023  No Yes   Sig: Take 1 tablet by mouth every 12 (twelve) hours for 7 days   ergocalciferol (VITAMIN D2) 50,000 units 4/26/2023  Yes Yes   Sig: TAKE 1 CAPSULE BY MOUTH ONE TIME PER WEEK   ferrous sulfate 325 (65 Fe) mg tablet   Yes No   methocarbamol (ROBAXIN) 500 mg tablet 4/26/2023  Yes Yes   Sig: TAKE 1 TABLET BY MOUTH 4 TIMES A DAY AS NEEDED FOR MUSCLE SPASMS  naproxen (NAPROSYN) 500 mg tablet 4/26/2023  Yes Yes   Sig: TAKE 1 TABLET BY MOUTH 2 TIMES A DAY AS NEEDED FOR MODERATE PAIN (PAIN SCORE 4-6)   TAKE WITH MEALS    ondansetron (Zofran ODT) 4 mg disintegrating tablet   No No   Sig: Take 1 tablet (4 mg total) by mouth every 6 (six) hours as needed for nausea or vomiting      Facility-Administered Medications: None       Past Medical History:   Diagnosis Date    Anxiety     Asthma     Back pain     Depression     POTS (postural orthostatic tachycardia syndrome)        Past Surgical History:   Procedure Laterality Date    TONSILLECTOMY      TONSILLECTOMY         Family History   Problem Relation Age of Onset    Bipolar disorder Mother     Scoliosis Mother     Allergy (severe) Sister     Kidney disease Brother     Breast cancer Maternal Aunt     Diabetes Maternal Grandmother      I have reviewed and agree with the history as documented  E-Cigarette/Vaping    E-Cigarette Use Never User      E-Cigarette/Vaping Substances    Nicotine No     THC No     CBD No     Flavoring No     Other No     Unknown No      Social History     Tobacco Use    Smoking status: Former     Packs/day: 0 25     Types: Cigarettes    Smokeless tobacco: Never    Tobacco comments:     one cigarette a day   Vaping Use    Vaping Use: Never used   Substance Use Topics    Alcohol use: Not Currently    Drug use: Not Currently     Comment: previopus use of meth  Review of Systems   Constitutional: Negative for appetite change, chills and fever  HENT: Positive for congestion, postnasal drip and sinus pain  Respiratory: Positive for cough, chest tightness and shortness of breath  Cardiovascular: Negative for chest pain and leg swelling  Gastrointestinal: Negative for abdominal pain, diarrhea, nausea and vomiting  All other systems reviewed and are negative  Physical Exam  Physical Exam  Vitals reviewed  Constitutional:       General: She is not in acute distress  Appearance: Normal appearance  She is not ill-appearing, toxic-appearing or diaphoretic  HENT:      Head: Normocephalic and atraumatic  Right Ear: External ear normal       Left Ear: External ear normal       Nose: Nose normal    Eyes:      General:         Right eye: No discharge  Left eye: No discharge  Extraocular Movements: Extraocular movements intact  Cardiovascular:      Rate and Rhythm: Normal rate and regular rhythm  Pulmonary:      Effort: Pulmonary effort is normal  No respiratory distress  Breath sounds: No stridor  Wheezing (Right mid posterior field, expiratory) present  No rhonchi or rales  Musculoskeletal:         General: Signs of injury (Right LE in walking boot, states no fracture but ligamentous damage that has worsened, is ambulatory) present  No deformity  Skin:     General: Skin is warm  Coloration: Skin is not jaundiced or pale  Neurological:      General: No focal deficit present  Mental Status: She is alert  Mental status is at baseline  Vital Signs  ED Triage Vitals [04/26/23 2104]   Temperature Pulse Respirations Blood Pressure SpO2   97 8 °F (36 6 °C) 78 18 118/56 98 %      Temp Source Heart Rate Source Patient Position - Orthostatic VS BP Location FiO2 (%)   Tympanic Monitor Sitting Right arm --      Pain Score       2           Vitals:    04/26/23 2104   BP: 118/56   Pulse: 78   Patient Position - Orthostatic VS: Sitting         Visual Acuity      ED Medications  Medications   albuterol inhalation solution 5 mg (5 mg Nebulization Given 4/26/23 2135)   albuterol (PROVENTIL HFA,VENTOLIN HFA) inhaler 2 puff (2 puffs Inhalation Given 4/26/23 2226)       Diagnostic Studies  Results Reviewed     None                 XR chest 1 view portable    (Results Pending)              Procedures  Procedures         ED Course  ED Course as of 04/27/23 0027   Wed Apr 26, 2023 2156 XR chest 1 view portable  My interpretation, no infiltrate, no acute cardiopulmonary disease   2223 Feels improved, advised of x-ray imaging results showing no pneumonia  Patient continue taking Augmentin  Additionally she can try an allergy medication, mom believes they have over-the-counter Zyrtec at home  Flonase may help further dry secretions  Patient states she needs an albuterol inhaler for home, will supply this  PERC Rule for PE    Flowsheet Row Most Recent Value   PERC Rule for PE    Age >=50 0 Filed at: 04/27/2023 0027   HR >=100 0 Filed at: 04/27/2023 0027   O2 Sat on room air < 95% 0 Filed at: 04/27/2023 5490   History of PE or DVT 0 Filed at: 04/27/2023 3924   Recent trauma or surgery 0 Filed at: 04/27/2023 0027   Hemoptysis 0 Filed at: 04/27/2023 6015   Exogenous estrogen 0 Filed at: 04/27/2023 0027   Unilateral leg swelling 0 Filed at: 04/27/2023 0027   PERC Rule for PE Results 0 Filed at: 04/27/2023 7332                            Medical Decision Making  15-year-old female presents for evaluation of persistent cold symptoms  She is overall well-appearing on examination  She does have focal expiratory wheezing in her right mid posterior lung field, will obtain chest x-ray to rule out pneumonia  Patient is taking Augmentin  Will provide breathing treatment as well as inhaler for home  Patient continue symptomatic management at home  Amount and/or Complexity of Data Reviewed  Radiology: ordered  Decision-making details documented in ED Course  Risk  Prescription drug management  Disposition  Final diagnoses:   URI (upper respiratory infection)   Sinusitis     Time reflects when diagnosis was documented in both MDM as applicable and the Disposition within this note     Time User Action Codes Description Comment    4/26/2023 10:24 PM Preethi Sanderson [J06 9] URI (upper respiratory infection)     4/26/2023 10:24 PM Preethi Sanderson [J32 9] Sinusitis       ED Disposition     ED Disposition   Discharge    Condition   Stable    Date/Time   Wed Apr 26, 2023 10:24 PM    [address] discharge to home/self care                 Follow-up Information     Follow up With Specialties Details Why Leora Cortez PA-C Family Medicine Schedule an appointment as soon as possible for a visit   [address] [place] [address]  [phone]            Discharge Medication List as of 4/26/2023 10:24 PM      CONTINUE these medications which have NOT CHANGED    Details   albuterol (PROVENTIL HFA,VENTOLIN HFA) 90 mcg/act inhaler Inhale 2 puffs every 6 (six) hours as needed for wheezing  , Historical Med      amoxicillin-clavulanate (AUGMENTIN) 875-125 mg per tablet Take 1 tablet by mouth every 12 (twelve) hours for 7 days, Starting Mon 4/24/2023, Until Mon 5/1/2023, Normal      ergocalciferol (VITAMIN D2) 50,000 units TAKE 1 CAPSULE BY MOUTH ONE TIME PER WEEK, Historical Med      ferrous sulfate 325 (65 Fe) mg tablet Starting Mon 3/28/2022, Historical Med      methocarbamol (ROBAXIN) 500 mg tablet TAKE 1 TABLET BY MOUTH 4 TIMES A DAY AS NEEDED FOR MUSCLE SPASMS , Historical Med      naproxen (NAPROSYN) 500 mg tablet TAKE 1 TABLET BY MOUTH 2 TIMES A DAY AS NEEDED FOR MODERATE PAIN (PAIN SCORE 4-6)  TAKE WITH MEALS , Historical Med      ondansetron (Zofran ODT) 4 mg disintegrating tablet Take 1 tablet (4 mg total) by mouth every 6 (six) hours as needed for nausea or vomiting, Starting Fri 6/24/2022, Normal      Xulane 150-35 MCG/24HR APPLY 1 PATCH ONE TIME PER WEEK, Historical Med             No discharge procedures on file      PDMP Review     None          ED Provider  Electronically Signed by           Candido Kwong DO  04/27/23 5350

## 2023-05-08 ENCOUNTER — OFFICE VISIT (OUTPATIENT)
Dept: PODIATRY | Facility: CLINIC | Age: 18
End: 2023-05-08

## 2023-05-08 VITALS
SYSTOLIC BLOOD PRESSURE: 108 MMHG | BODY MASS INDEX: 23 KG/M2 | HEIGHT: 62 IN | WEIGHT: 125 LBS | HEART RATE: 66 BPM | DIASTOLIC BLOOD PRESSURE: 69 MMHG

## 2023-05-08 DIAGNOSIS — M25.571 CHRONIC PAIN OF RIGHT ANKLE: ICD-10-CM

## 2023-05-08 DIAGNOSIS — G89.29 CHRONIC PAIN OF RIGHT ANKLE: ICD-10-CM

## 2023-05-08 DIAGNOSIS — G90.521 COMPLEX REGIONAL PAIN SYNDROME TYPE 1 OF RIGHT LOWER EXTREMITY: ICD-10-CM

## 2023-05-08 DIAGNOSIS — M79.671 RIGHT FOOT PAIN: Primary | ICD-10-CM

## 2023-05-08 NOTE — PROGRESS NOTES
Assessment/Plan:    No problem-specific Assessment & Plan notes found for this encounter  Diagnoses and all orders for this visit:    Right foot pain  -     Ambulatory referral to Physical Therapy; Future  -     MRI ankle/heel right wo contrast; Future    Chronic pain of right ankle  -     Ambulatory referral to Physical Therapy; Future  -     MRI ankle/heel right wo contrast; Future    Complex regional pain syndrome type 1 of right lower extremity  -     Ambulatory referral to Physical Therapy; Future  -     MRI ankle/heel right wo contrast; Future      Plan:     -  Patient was counseled and educated on the condition and the diagnosis  The diagnosis, treatment options and prognosis were discussed in detail    -Obtain right ankle MRI for review of possible right lateral ankle ligament injury leading to chronic ankle instability    -Rx physical therapy ASAP and aggressive deep tissue massage with lotion-I suspect possible complex regional pain syndrome from patients clinical signs and symtomes   -Return in 2 to 3 weeks for follow-up    Subjective:      Patient ID: Victoriano Richardson is a 21 y o  female  20-year-old female with past medical history as below presents for an evaluation of right ankle pain from an injury she sustained end of January this year  Patient reports he continues to have discomfort and at times edema with purpleish discoloration and sensitive to touch  She has not noted recent episode of this  Patient does report to me in cam boot her pain is well controlled  However she continues to have some discomfort with ambulation without the boot  She denies any other complaints at this time  Denies nausea vomit fever chills shortness of breath  The following portions of the patient's history were reviewed and updated as appropriate:   She  has a past medical history of Anxiety, Asthma, Back pain, Depression, and POTS (postural orthostatic tachycardia syndrome)    She   Patient Active "Problem List    Diagnosis Date Noted   • Right foot pain 04/17/2023   • Thoracogenic scoliosis of thoracic region 04/19/2022   • Asthma 04/03/2019     She  has a past surgical history that includes Tonsillectomy and Tonsillectomy       Review of Systems   All other systems reviewed and are negative  Objective:      /69 (BP Location: Right arm, Patient Position: Sitting, Cuff Size: Standard)   Pulse 66   Ht 5' 2\" (1 575 m)   Wt 56 7 kg (125 lb)   LMP 04/25/2023 (Exact Date)   BMI 22 86 kg/m²          Physical Exam  Vitals reviewed  Musculoskeletal:      Right foot: Tenderness present  No swelling or deformity  Comments: Tenderness to touch noted to the lateral ankle joint at the level of ATFL right  No pain along the peroneal tendons  No pain along the anterior or medial ankle joint line  Range of motion of ankle within normal limits  Discomfort with ankle inversion and eversion  No erythema edema or ecchymosis present throughout right ankle or foot           "

## 2023-05-12 ENCOUNTER — HOSPITAL ENCOUNTER (OUTPATIENT)
Dept: MRI IMAGING | Facility: HOSPITAL | Age: 18
Discharge: HOME | End: 2023-05-12
Attending: STUDENT IN AN ORGANIZED HEALTH CARE EDUCATION/TRAINING PROGRAM

## 2023-05-12 DIAGNOSIS — G90.521 COMPLEX REGIONAL PAIN SYNDROME TYPE 1 OF RIGHT LOWER EXTREMITY: ICD-10-CM

## 2023-05-12 DIAGNOSIS — M79.671 RIGHT FOOT PAIN: ICD-10-CM

## 2023-05-12 DIAGNOSIS — G89.29 CHRONIC PAIN OF RIGHT ANKLE: ICD-10-CM

## 2023-05-12 DIAGNOSIS — M25.571 CHRONIC PAIN OF RIGHT ANKLE: ICD-10-CM

## 2023-07-11 ENCOUNTER — APPOINTMENT (EMERGENCY)
Dept: CT IMAGING | Facility: HOSPITAL | Age: 18
End: 2023-07-11
Payer: COMMERCIAL

## 2023-07-11 ENCOUNTER — HOSPITAL ENCOUNTER (EMERGENCY)
Facility: HOSPITAL | Age: 18
Discharge: HOME | End: 2023-07-11
Attending: EMERGENCY MEDICINE
Payer: COMMERCIAL

## 2023-07-11 VITALS
BODY MASS INDEX: 24.56 KG/M2 | RESPIRATION RATE: 18 BRPM | HEART RATE: 70 BPM | SYSTOLIC BLOOD PRESSURE: 98 MMHG | DIASTOLIC BLOOD PRESSURE: 55 MMHG | TEMPERATURE: 97.9 F | OXYGEN SATURATION: 95 % | WEIGHT: 134.26 LBS

## 2023-07-11 DIAGNOSIS — G43.909 MIGRAINE HEADACHE: Primary | ICD-10-CM

## 2023-07-11 DIAGNOSIS — S06.0XAA CONCUSSION: ICD-10-CM

## 2023-07-11 PROCEDURE — 70450 CT HEAD/BRAIN W/O DYE: CPT

## 2023-07-11 RX ORDER — KETOROLAC TROMETHAMINE 30 MG/ML
30 INJECTION, SOLUTION INTRAMUSCULAR; INTRAVENOUS ONCE
Status: DISCONTINUED | OUTPATIENT
Start: 2023-07-11 | End: 2023-07-11 | Stop reason: HOSPADM

## 2023-07-11 RX ORDER — METOCLOPRAMIDE HYDROCHLORIDE 5 MG/ML
10 INJECTION INTRAMUSCULAR; INTRAVENOUS ONCE
Status: COMPLETED | OUTPATIENT
Start: 2023-07-11 | End: 2023-07-11

## 2023-07-11 RX ADMIN — METOCLOPRAMIDE 10 MG: 5 INJECTION, SOLUTION INTRAMUSCULAR; INTRAVENOUS at 19:02

## 2023-07-11 NOTE — ED PROVIDER NOTES
History  Chief Complaint   Patient presents with   • Eye Problem     Patient reports metal closet hitting her in the head on Saturday and now eye started twitching today. Reports h/o concussions, reports migraine, and pressure in her ears. This 78-year-old female. She does have a history of migraine headache disorder. She struck her head on a heavy closet on Saturday. She developed a headache and had some vomiting. Her vision went "in and out". She had some ringing in her ears. Headache has persisted. No neck pain. Today she developed bilateral eye twitching, more on the right. No diplopia. She has never had problems like this with eye twitching. Symptoms are moderately severe. No relieving factors. Prior to Admission Medications   Prescriptions Last Dose Informant Patient Reported? Taking? Xulane 150-35 MCG/24HR   Yes No   Sig: APPLY 1 PATCH ONE TIME PER WEEK   Patient not taking: Reported on 3/11/2023   albuterol (PROVENTIL HFA,VENTOLIN HFA) 90 mcg/act inhaler   Yes No   Sig: Inhale 2 puffs every 6 (six) hours as needed for wheezing     ergocalciferol (VITAMIN D2) 50,000 units   Yes No   Sig: TAKE 1 CAPSULE BY MOUTH ONE TIME PER WEEK   ferrous sulfate 325 (65 Fe) mg tablet   Yes No   methocarbamol (ROBAXIN) 500 mg tablet   Yes No   Sig: TAKE 1 TABLET BY MOUTH 4 TIMES A DAY AS NEEDED FOR MUSCLE SPASMS. naproxen (NAPROSYN) 500 mg tablet   Yes No   Sig: TAKE 1 TABLET BY MOUTH 2 TIMES A DAY AS NEEDED FOR MODERATE PAIN (PAIN SCORE 4-6).  TAKE WITH MEALS.   ondansetron (Zofran ODT) 4 mg disintegrating tablet   No No   Sig: Take 1 tablet (4 mg total) by mouth every 6 (six) hours as needed for nausea or vomiting      Facility-Administered Medications: None       Past Medical History:   Diagnosis Date   • Anxiety    • Asthma    • Back pain    • Depression    • POTS (postural orthostatic tachycardia syndrome)        Past Surgical History:   Procedure Laterality Date   • TONSILLECTOMY     • TONSILLECTOMY         Family History   Problem Relation Age of Onset   • Bipolar disorder Mother    • Scoliosis Mother    • Allergy (severe) Sister    • Kidney disease Brother    • Breast cancer Maternal Aunt    • Diabetes Maternal Grandmother      I have reviewed and agree with the history as documented. E-Cigarette/Vaping   • E-Cigarette Use Never User      E-Cigarette/Vaping Substances   • Nicotine No    • THC No    • CBD No    • Flavoring No    • Other No    • Unknown No      Social History     Tobacco Use   • Smoking status: Former     Packs/day: 0.25     Types: Cigarettes   • Smokeless tobacco: Never   • Tobacco comments:     one cigarette a day   Vaping Use   • Vaping Use: Never used   Substance Use Topics   • Alcohol use: Not Currently   • Drug use: Not Currently     Comment: previopus use of meth. Review of Systems   Constitutional: Negative for chills and fever. HENT: Positive for tinnitus. Negative for rhinorrhea and sore throat. Eyes: Positive for visual disturbance. Negative for pain and redness. Respiratory: Negative for cough and shortness of breath. Cardiovascular: Negative for chest pain and leg swelling. Gastrointestinal: Positive for nausea and vomiting. Negative for abdominal pain and diarrhea. Endocrine: Negative for polydipsia and polyuria. Genitourinary: Negative for dysuria, frequency, hematuria, vaginal bleeding and vaginal discharge. Musculoskeletal: Negative for back pain and neck pain. Skin: Negative for rash and wound. Allergic/Immunologic: Negative for immunocompromised state. Neurological: Positive for headaches. Negative for weakness and numbness. Hematological: Does not bruise/bleed easily. Psychiatric/Behavioral: Negative for hallucinations and suicidal ideas. All other systems reviewed and are negative. Physical Exam  Physical Exam  Vitals reviewed. Constitutional:       General: She is not in acute distress.   HENT:      Head: Normocephalic and atraumatic. Nose: Nose normal.      Mouth/Throat:      Mouth: Mucous membranes are moist.   Eyes:      General:         Right eye: No discharge. Left eye: No discharge. Extraocular Movements: Extraocular movements intact. Conjunctiva/sclera: Conjunctivae normal.      Pupils: Pupils are equal, round, and reactive to light. Neck:      Comments: No midline cervical tenderness. Cardiovascular:      Rate and Rhythm: Normal rate and regular rhythm. Pulses: Normal pulses. Heart sounds: Normal heart sounds. No murmur heard. No friction rub. No gallop. Pulmonary:      Effort: Pulmonary effort is normal. No respiratory distress. Breath sounds: Normal breath sounds. No stridor. No wheezing, rhonchi or rales. Abdominal:      General: Bowel sounds are normal. There is no distension. Palpations: Abdomen is soft. Tenderness: There is no abdominal tenderness. There is no right CVA tenderness, left CVA tenderness, guarding or rebound. Musculoskeletal:         General: No swelling, tenderness, deformity or signs of injury. Normal range of motion. Cervical back: Normal range of motion and neck supple. No rigidity or tenderness. Right lower leg: No edema. Left lower leg: No edema. Comments: No calf tenderness or unilateral leg swelling. Skin:     General: Skin is warm and dry. Coloration: Skin is not jaundiced. Findings: No rash. Neurological:      General: No focal deficit present. Mental Status: She is alert and oriented to person, place, and time. Cranial Nerves: Cranial nerves 2-12 are intact. Sensory: No sensory deficit. Motor: Motor function is intact.    Psychiatric:         Mood and Affect: Mood normal.         Behavior: Behavior normal.         Vital Signs  ED Triage Vitals [07/11/23 1749]   Temperature Pulse Respirations Blood Pressure SpO2   97.9 °F (36.6 °C) 75 16 131/63 97 %      Temp src Heart Rate Source Patient Position - Orthostatic VS BP Location FiO2 (%)   -- Monitor Sitting Left arm --      Pain Score       7           Vitals:    07/11/23 1749 07/11/23 1800   BP: 131/63 98/55   Pulse: 75 70   Patient Position - Orthostatic VS: Sitting Sitting         Visual Acuity      ED Medications  Medications   ketorolac (TORADOL) injection 30 mg (has no administration in time range)   metoclopramide (REGLAN) injection 10 mg (10 mg Intravenous Given 7/11/23 1902)       Diagnostic Studies  Results Reviewed     None                 CT head without contrast   Final Result by Swati Mtz DO (07/11 2017)   No acute intracranial abnormality. Workstation performed: [id number]                    Procedures  Procedures         ED Course         CRAFFT    Flowsheet Row Most Recent Value   CRAFFT Initial Screen: During the past 12 months, did you:    1. Drink any alcohol (more than a few sips)? No Filed at: 07/11/2023 1749   2. Smoke any marijuana or hashish No Filed at: 07/11/2023 1749   3. Use anything else to get high? ("anything else" includes illegal drugs, over the counter and prescription drugs, and things that you sniff or 'cervantes')? No Filed at: 07/11/2023 1749                                          Medical Decision Making  CT head was negative for skull fracture or intracranial hemorrhage. Headache and eye fluttering improved with migraine treatment. There may be some element of concussion here. Likely complicated by migraine headache. This is not a stroke. This is not a seizure. This is not meningitis. Appropriate for discharge and outpatient management. Amount and/or Complexity of Data Reviewed  Radiology: ordered. Decision-making details documented in ED Course. Risk  Prescription drug management. Decision regarding hospitalization.           Disposition  Final diagnoses:   Migraine headache   Concussion     Time reflects when diagnosis was documented in both MDM as applicable and the Disposition within this note     Time User Action Codes Description Comment    7/11/2023  8:35 PM Amara Nixon Add [G43.909] Migraine headache     7/11/2023  8:35 PM Amara Nixon Add [S06. 0XAA] Concussion       ED Disposition     ED Disposition   Discharge    Condition   Stable    Date/Time   Tue Jul 11, 2023  8:35 PM    Comment   A[facility] [place] discharge to home/self care. Follow-up Information     Follow up With Specialties Details Why Contact Info Additional Information    [facility] In 2 days  [address] [facility] [address] [phone] [facility], [address] [facility], [facility], [address] [place] [address] Road,[address], [address] [facility] [facility] [address]          Patient's Medications   Discharge Prescriptions    No medications on file       No discharge procedures on file.     PDMP Review     None          ED Provider  Electronically Signed by           Amara Nixon MD  07/11/23 2036

## 2023-07-21 ENCOUNTER — APPOINTMENT (EMERGENCY)
Dept: RADIOLOGY | Facility: HOSPITAL | Age: 18
End: 2023-07-21
Payer: COMMERCIAL

## 2023-07-21 ENCOUNTER — HOSPITAL ENCOUNTER (EMERGENCY)
Facility: HOSPITAL | Age: 18
Discharge: HOME | End: 2023-07-21
Attending: EMERGENCY MEDICINE
Payer: COMMERCIAL

## 2023-07-21 VITALS
RESPIRATION RATE: 22 BRPM | HEART RATE: 53 BPM | WEIGHT: 132 LBS | OXYGEN SATURATION: 98 % | BODY MASS INDEX: 24.92 KG/M2 | TEMPERATURE: 98.9 F | SYSTOLIC BLOOD PRESSURE: 104 MMHG | HEIGHT: 61 IN | DIASTOLIC BLOOD PRESSURE: 51 MMHG

## 2023-07-21 DIAGNOSIS — R55 NEAR SYNCOPE: Primary | ICD-10-CM

## 2023-07-21 LAB
ALBUMIN SERPL BCP-MCNC: 4.6 G/DL (ref 3.5–5)
ALP SERPL-CCNC: 54 U/L (ref 34–104)
ALT SERPL W P-5'-P-CCNC: 12 U/L (ref 7–52)
ANION GAP SERPL CALCULATED.3IONS-SCNC: 8 MMOL/L
AST SERPL W P-5'-P-CCNC: 13 U/L (ref 13–39)
BASOPHILS # BLD AUTO: 0.03 THOUSANDS/ÂΜL (ref 0–0.1)
BASOPHILS NFR BLD AUTO: 1 % (ref 0–1)
BILIRUB SERPL-MCNC: 0.77 MG/DL (ref 0.2–1)
BUN SERPL-MCNC: 14 MG/DL (ref 5–25)
CALCIUM SERPL-MCNC: 9.8 MG/DL (ref 8.4–10.2)
CARDIAC TROPONIN I PNL SERPL HS: <2 NG/L
CHLORIDE SERPL-SCNC: 108 MMOL/L (ref 96–108)
CO2 SERPL-SCNC: 23 MMOL/L (ref 21–32)
CREAT SERPL-MCNC: 0.78 MG/DL (ref 0.6–1.3)
D DIMER PPP FEU-MCNC: 0.39 UG/ML FEU
EOSINOPHIL # BLD AUTO: 0.18 THOUSAND/ÂΜL (ref 0–0.61)
EOSINOPHIL NFR BLD AUTO: 3 % (ref 0–6)
ERYTHROCYTE [DISTWIDTH] IN BLOOD BY AUTOMATED COUNT: 11.8 % (ref 11.6–15.1)
GFR SERPL CREATININE-BSD FRML MDRD: 109 ML/MIN/1.73SQ M
GLUCOSE SERPL-MCNC: 93 MG/DL (ref 65–140)
HCG SERPL QL: NEGATIVE
HCT VFR BLD AUTO: 41.7 % (ref 34.8–46.1)
HGB BLD-MCNC: 14.1 G/DL (ref 11.5–15.4)
IMM GRANULOCYTES # BLD AUTO: 0.01 THOUSAND/UL (ref 0–0.2)
IMM GRANULOCYTES NFR BLD AUTO: 0 % (ref 0–2)
LYMPHOCYTES # BLD AUTO: 2.54 THOUSANDS/ÂΜL (ref 0.6–4.47)
LYMPHOCYTES NFR BLD AUTO: 38 % (ref 14–44)
MCH RBC QN AUTO: 30.4 PG (ref 26.8–34.3)
MCHC RBC AUTO-ENTMCNC: 33.8 G/DL (ref 31.4–37.4)
MCV RBC AUTO: 90 FL (ref 82–98)
MONOCYTES # BLD AUTO: 0.42 THOUSAND/ÂΜL (ref 0.17–1.22)
MONOCYTES NFR BLD AUTO: 6 % (ref 4–12)
NEUTROPHILS # BLD AUTO: 3.46 THOUSANDS/ÂΜL (ref 1.85–7.62)
NEUTS SEG NFR BLD AUTO: 52 % (ref 43–75)
NRBC BLD AUTO-RTO: 0 /100 WBCS
PLATELET # BLD AUTO: 170 THOUSANDS/UL (ref 149–390)
PMV BLD AUTO: 9.5 FL (ref 8.9–12.7)
POTASSIUM SERPL-SCNC: 4 MMOL/L (ref 3.5–5.3)
PROT SERPL-MCNC: 7.6 G/DL (ref 6.4–8.4)
RBC # BLD AUTO: 4.64 MILLION/UL (ref 3.81–5.12)
SODIUM SERPL-SCNC: 139 MMOL/L (ref 135–147)
WBC # BLD AUTO: 6.64 THOUSAND/UL (ref 4.31–10.16)

## 2023-07-21 PROCEDURE — 36415 COLL VENOUS BLD VENIPUNCTURE: CPT | Performed by: EMERGENCY MEDICINE

## 2023-07-21 PROCEDURE — 85025 COMPLETE CBC W/AUTO DIFF WBC: CPT | Performed by: EMERGENCY MEDICINE

## 2023-07-21 PROCEDURE — 84484 ASSAY OF TROPONIN QUANT: CPT | Performed by: EMERGENCY MEDICINE

## 2023-07-21 PROCEDURE — 99284 EMERGENCY DEPT VISIT MOD MDM: CPT

## 2023-07-21 PROCEDURE — 71045 X-RAY EXAM CHEST 1 VIEW: CPT

## 2023-07-21 PROCEDURE — 84703 CHORIONIC GONADOTROPIN ASSAY: CPT | Performed by: EMERGENCY MEDICINE

## 2023-07-21 PROCEDURE — 96360 HYDRATION IV INFUSION INIT: CPT

## 2023-07-21 PROCEDURE — 99285 EMERGENCY DEPT VISIT HI MDM: CPT | Performed by: EMERGENCY MEDICINE

## 2023-07-21 PROCEDURE — 85379 FIBRIN DEGRADATION QUANT: CPT | Performed by: EMERGENCY MEDICINE

## 2023-07-21 PROCEDURE — 80053 COMPREHEN METABOLIC PANEL: CPT | Performed by: EMERGENCY MEDICINE

## 2023-07-21 RX ADMIN — SODIUM CHLORIDE 2000 ML: 0.9 INJECTION, SOLUTION INTRAVENOUS at 13:10

## 2023-07-21 NOTE — ED PROVIDER NOTES
History  Chief Complaint   Patient presents with   • Medical Problem     Patient has hx of POTS and is complaining of weakness, chest pain, dizziness, lightheadedness and vomiting since Sunday night     Patient is a 23-year-old female. She has a history of asthma. She has a history of POTS. He has a history of depression and anxiety. No drug use. She is a former smoker. She has not felt well for couple days. She is felt weak. She has been experiencing chest pain. She has felt short of breath. Today she became near syncopal.  She was brought to the emergency room. No cough, fever, hemoptysis, or unilateral leg swelling. No calf pain. No vomiting or diarrhea. No vaginal bleeding. No hematemesis, hematochezia, or melena. Patient feels somewhat improved since arrival in the emergency room. She was recently placed on blood pressure medicine. She is unclear which one. She currently has a Holter monitor in place. Prior to Admission Medications   Prescriptions Last Dose Informant Patient Reported? Taking? Xulane 150-35 MCG/24HR   Yes No   Sig: APPLY 1 PATCH ONE TIME PER WEEK   Patient not taking: Reported on 3/11/2023   albuterol (PROVENTIL HFA,VENTOLIN HFA) 90 mcg/act inhaler   Yes No   Sig: Inhale 2 puffs every 6 (six) hours as needed for wheezing     ergocalciferol (VITAMIN D2) 50,000 units   Yes No   Sig: TAKE 1 CAPSULE BY MOUTH ONE TIME PER WEEK   ferrous sulfate 325 (65 Fe) mg tablet   Yes No   methocarbamol (ROBAXIN) 500 mg tablet   Yes No   Sig: TAKE 1 TABLET BY MOUTH 4 TIMES A DAY AS NEEDED FOR MUSCLE SPASMS. naproxen (NAPROSYN) 500 mg tablet   Yes No   Sig: TAKE 1 TABLET BY MOUTH 2 TIMES A DAY AS NEEDED FOR MODERATE PAIN (PAIN SCORE 4-6).  TAKE WITH MEALS.   ondansetron (Zofran ODT) 4 mg disintegrating tablet   No No   Sig: Take 1 tablet (4 mg total) by mouth every 6 (six) hours as needed for nausea or vomiting      Facility-Administered Medications: None       Past Medical History: Diagnosis Date   • Anxiety    • Asthma    • Back pain    • Depression    • POTS (postural orthostatic tachycardia syndrome)        Past Surgical History:   Procedure Laterality Date   • TONSILLECTOMY     • TONSILLECTOMY         Family History   Problem Relation Age of Onset   • Bipolar disorder Mother    • Scoliosis Mother    • Allergy (severe) Sister    • Kidney disease Brother    • Breast cancer Maternal Aunt    • Diabetes Maternal Grandmother      I have reviewed and agree with the history as documented. E-Cigarette/Vaping   • E-Cigarette Use Never User      E-Cigarette/Vaping Substances   • Nicotine No    • THC No    • CBD No    • Flavoring No    • Other No    • Unknown No      Social History     Tobacco Use   • Smoking status: Former     Packs/day: 0.25     Types: Cigarettes   • Smokeless tobacco: Never   • Tobacco comments:     one cigarette a day   Vaping Use   • Vaping Use: Never used   Substance Use Topics   • Alcohol use: Not Currently   • Drug use: Not Currently     Comment: previopus use of meth. Review of Systems   Constitutional: Negative for chills and fever. HENT: Negative for rhinorrhea and sore throat. Eyes: Negative for pain, redness and visual disturbance. Respiratory: Positive for shortness of breath. Negative for cough. Cardiovascular: Positive for chest pain. Negative for leg swelling. Gastrointestinal: Negative for abdominal pain, diarrhea and vomiting. Endocrine: Negative for polydipsia and polyuria. Genitourinary: Negative for dysuria, frequency, hematuria, vaginal bleeding and vaginal discharge. Musculoskeletal: Negative for back pain and neck pain. Skin: Negative for rash and wound. Allergic/Immunologic: Negative for immunocompromised state. Neurological: Positive for dizziness. Negative for weakness, numbness and headaches. Hematological: Does not bruise/bleed easily. Psychiatric/Behavioral: Negative for hallucinations and suicidal ideas.    All other systems reviewed and are negative. Physical Exam  Physical Exam  Vitals reviewed. Constitutional:       General: She is not in acute distress. HENT:      Head: Normocephalic and atraumatic. Nose: Nose normal.      Mouth/Throat:      Mouth: Mucous membranes are moist.   Eyes:      General:         Right eye: No discharge. Left eye: No discharge. Conjunctiva/sclera: Conjunctivae normal.   Cardiovascular:      Rate and Rhythm: Normal rate and regular rhythm. Pulses: Normal pulses. Heart sounds: Normal heart sounds. No murmur heard. No friction rub. No gallop. Pulmonary:      Effort: Pulmonary effort is normal. No respiratory distress. Breath sounds: Normal breath sounds. No stridor. No wheezing, rhonchi or rales. Abdominal:      General: Bowel sounds are normal. There is no distension. Palpations: Abdomen is soft. Tenderness: There is no abdominal tenderness. There is no right CVA tenderness, left CVA tenderness, guarding or rebound. Musculoskeletal:         General: No swelling, tenderness, deformity or signs of injury. Normal range of motion. Cervical back: Normal range of motion and neck supple. No rigidity. Right lower leg: No edema. Left lower leg: No edema. Comments: No calf tenderness or unilateral leg swelling. Skin:     General: Skin is warm and dry. Coloration: Skin is not jaundiced. Findings: No rash. Neurological:      General: No focal deficit present. Mental Status: She is alert and oriented to person, place, and time. Sensory: No sensory deficit. Motor: Motor function is intact.    Psychiatric:         Mood and Affect: Mood normal.         Behavior: Behavior normal.         Vital Signs  ED Triage Vitals   Temperature Pulse Respirations Blood Pressure SpO2   07/21/23 1251 07/21/23 1300 07/21/23 1251 07/21/23 1251 07/21/23 1251   98.9 °F (37.2 °C) (!) 54 16 119/63 98 %      Temp Source Heart Rate Source Patient Position - Orthostatic VS BP Location FiO2 (%)   07/21/23 1251 07/21/23 1251 -- 07/21/23 1300 --   Temporal Monitor  Left arm       Pain Score       07/21/23 1251       5           Vitals:    07/21/23 1251 07/21/23 1300   BP: 119/63 111/62   Pulse:  (!) 54         Visual Acuity      ED Medications  Medications   sodium chloride 0.9 % bolus 2,000 mL (2,000 mL Intravenous New Bag 7/21/23 1310)       Diagnostic Studies  Results Reviewed     Procedure Component Value Units Date/Time    hCG, qualitative pregnancy [[id number]]  (Normal) Collected: 07/21/23 1301    Lab Status: Final result Specimen: Blood from Arm, Right Updated: 07/21/23 1331     Preg, Serum Negative    HS Troponin I 2hr [[id number]]     Lab Status: No result Specimen: Blood     HS Troponin 0hr (reflex protocol) [[id number]]  (Normal) Collected: 07/21/23 1301    Lab Status: Final result Specimen: Blood from Arm, Right Updated: 07/21/23 1330     hs TnI 0hr <2 ng/L     Comprehensive metabolic panel [[id number]] Collected: 07/21/23 1301    Lab Status: Final result Specimen: Blood from Arm, Right Updated: 07/21/23 1326     Sodium 139 mmol/L      Potassium 4.0 mmol/L      Chloride 108 mmol/L      CO2 23 mmol/L      ANION GAP 8 mmol/L      BUN 14 mg/dL      Creatinine 0.78 mg/dL      Glucose 93 mg/dL      Calcium 9.8 mg/dL      AST 13 U/L      ALT 12 U/L      Alkaline Phosphatase 54 U/L      Total Protein 7.6 g/dL      Albumin 4.6 g/dL      Total Bilirubin 0.77 mg/dL      eGFR 109 ml/min/1.73sq m     Narrative:      [place] guidelines for Chronic Kidney Disease (CKD):   •  Stage 1 with normal or high GFR (GFR > 90 mL/min/1.73 square meters)  •  Stage 2 Mild CKD (GFR = 60-89 mL/min/1.73 square meters)  •  Stage 3A Moderate CKD (GFR = 45-59 mL/min/1.73 square meters)  •  Stage 3B Moderate CKD (GFR = 30-44 mL/min/1.73 square meters)  •  Stage 4 Severe CKD (GFR = 15-29 mL/min/1.73 square meters)  •  Stage 5 End Stage CKD (GFR <15 mL/min/1.73 square meters)  Note: GFR calculation is accurate only with a steady state creatinine    D-Dimer [[id number]]  (Normal) Collected: 07/21/23 1301    Lab Status: Final result Specimen: Blood from Arm, Right Updated: 07/21/23 1324     D-Dimer, Quant 0.39 ug/ml FEU     CBC and differential [[id number]] Collected: 07/21/23 1301    Lab Status: Final result Specimen: Blood from Arm, Right Updated: 07/21/23 1312     WBC 6.64 Thousand/uL      RBC 4.64 Million/uL      Hemoglobin 14.1 g/dL      Hematocrit 41.7 %      MCV 90 fL      MCH 30.4 pg      MCHC 33.8 g/dL      RDW 11.8 %      MPV 9.5 fL      Platelets 488 Thousands/uL      nRBC 0 /100 WBCs      Neutrophils Relative 52 %      Immat GRANS % 0 %      Lymphocytes Relative 38 %      Monocytes Relative 6 %      Eosinophils Relative 3 %      Basophils Relative 1 %      Neutrophils Absolute 3.46 Thousands/µL      Immature Grans Absolute 0.01 Thousand/uL      Lymphocytes Absolute 2.54 Thousands/µL      Monocytes Absolute 0.42 Thousand/µL      Eosinophils Absolute 0.18 Thousand/µL      Basophils Absolute 0.03 Thousands/µL                  XR chest 1 view portable   ED Interpretation by Williams Oropeza MD (07/21 1328)   No cardiomegaly. No CHF. No infiltrates. No pneumothorax. No widened mediastinum. No acute disease      Final Result by Timo Mcintosh MD (07/21 8863)      No acute cardiopulmonary disease. Findings are stable            Workstation performed: [id number]                    Procedures  ECG 12 Lead Documentation Only    Date/Time: 7/21/2023 1:00 PM    Performed by: Williams Oropeza MD  Authorized by: Williams Oropeza MD    ECG reviewed by me, the ED Provider: yes    Patient location:  ED  Comments:      Sinus bradycardia with sinus arrhythmia. Rate of 57 bpm.  No ectopy. No acute ischemic ST or T wave changes. Normal EKG.              ED Course         CRAFFT    Flowsheet Row Most Recent Value   CRAFFT Initial Screen: During the past 12 months, did you:    1. Drink any alcohol (more than a few sips)? No Filed at: 07/21/2023 1257   2. Smoke any marijuana or hashish No Filed at: 07/21/2023 1254   3. Use anything else to get high? ("anything else" includes illegal drugs, over the counter and prescription drugs, and things that you sniff or 'cervantes')? No Filed at: 07/21/2023 1254                                          Medical Decision Making  Patient feels improved after ED treatment. EKG was without arrhythmia. There was no anemia. No hypoglycemia. Patient was hydrated. There was no laboratory evidence of dehydration. Patient is not pregnant. D-dimer was negative making pulmonary embolism unlikely. This is not acute myocardial infarction. There was no pneumothorax or pneumonia. There is no congestive heart failure. Doubt dissection. Doubt pericarditis or myocarditis. This might be an adverse medication effect. Patient was recently started on a beta-blocker. Patient is mildly bradycardic. Blood pressure is soft. We will have her hold her beta-blocker for now and follow-up with her cardiologist.    Amount and/or Complexity of Data Reviewed  Labs: ordered. Decision-making details documented in ED Course. Radiology: ordered and independent interpretation performed. Decision-making details documented in ED Course. ECG/medicine tests: ordered and independent interpretation performed. Decision-making details documented in ED Course. Risk  Prescription drug management. Decision regarding hospitalization.           Disposition  Final diagnoses:   Near syncope     Time reflects when diagnosis was documented in both MDM as applicable and the Disposition within this note     Time User Action Codes Description Comment    7/21/2023  1:57 PM Arjun Avila Add [R55] Near syncope       ED Disposition     ED Disposition   Discharge    Condition   Stable    Date/Time   Fri Jul 21, 2023  1:57 PM    [facility] discharge to home/self care. Follow-up Information     Follow up With Specialties Details Why Contact Info    Follow-up with your cardiologist on Monday              Patient's Medications   Discharge Prescriptions    No medications on file       No discharge procedures on file.     PDMP Review     None          ED Provider  Electronically Signed by           Mariama Rodríguez MD  07/21/23 7472

## 2023-10-08 ENCOUNTER — HOSPITAL ENCOUNTER (EMERGENCY)
Facility: HOSPITAL | Age: 18
Discharge: HOME | End: 2023-10-08
Attending: EMERGENCY MEDICINE
Payer: COMMERCIAL

## 2023-10-08 VITALS
WEIGHT: 134 LBS | HEIGHT: 61 IN | TEMPERATURE: 98.4 F | RESPIRATION RATE: 20 BRPM | HEART RATE: 79 BPM | SYSTOLIC BLOOD PRESSURE: 118 MMHG | BODY MASS INDEX: 25.3 KG/M2 | OXYGEN SATURATION: 96 % | DIASTOLIC BLOOD PRESSURE: 70 MMHG

## 2023-10-08 DIAGNOSIS — T62.91XA: Primary | ICD-10-CM

## 2023-10-08 DIAGNOSIS — R11.2 NAUSEA AND VOMITING, UNSPECIFIED VOMITING TYPE: ICD-10-CM

## 2023-10-08 LAB
ALBUMIN SERPL BCP-MCNC: 5.3 G/DL (ref 3.5–5)
ALP SERPL-CCNC: 51 U/L (ref 34–104)
ALT SERPL W P-5'-P-CCNC: 13 U/L (ref 7–52)
AMPHETAMINES SERPL QL SCN: NEGATIVE
ANION GAP SERPL CALCULATED.3IONS-SCNC: 11 MMOL/L
APAP SERPL-MCNC: <10 UG/ML (ref 10–20)
AST SERPL W P-5'-P-CCNC: 18 U/L (ref 13–39)
ATRIAL RATE: 101 BPM
BARBITURATES UR QL: NEGATIVE
BASE EX.OXY STD BLDV CALC-SCNC: 49.6 % (ref 60–80)
BASE EXCESS BLDV CALC-SCNC: -3.6 MMOL/L
BASOPHILS # BLD AUTO: 0.03 THOUSANDS/ÂΜL (ref 0–0.1)
BASOPHILS NFR BLD AUTO: 0 % (ref 0–1)
BENZODIAZ UR QL: NEGATIVE
BILIRUB SERPL-MCNC: 0.43 MG/DL (ref 0.2–1)
BILIRUB UR QL STRIP: NEGATIVE
BUN SERPL-MCNC: 10 MG/DL (ref 5–25)
CALCIUM SERPL-MCNC: 9.8 MG/DL (ref 8.4–10.2)
CARDIAC TROPONIN I PNL SERPL HS: 3 NG/L
CHLORIDE SERPL-SCNC: 106 MMOL/L (ref 96–108)
CLARITY UR: CLEAR
CO2 SERPL-SCNC: 23 MMOL/L (ref 21–32)
COCAINE UR QL: NEGATIVE
COLOR UR: ABNORMAL
CREAT SERPL-MCNC: 0.73 MG/DL (ref 0.6–1.3)
EOSINOPHIL # BLD AUTO: 0.14 THOUSAND/ÂΜL (ref 0–0.61)
EOSINOPHIL NFR BLD AUTO: 2 % (ref 0–6)
ERYTHROCYTE [DISTWIDTH] IN BLOOD BY AUTOMATED COUNT: 12.1 % (ref 11.6–15.1)
ETHANOL SERPL-MCNC: 160 MG/DL
GFR SERPL CREATININE-BSD FRML MDRD: 118 ML/MIN/1.73SQ M
GLUCOSE SERPL-MCNC: 95 MG/DL (ref 65–140)
GLUCOSE UR STRIP-MCNC: NEGATIVE MG/DL
HCO3 BLDV-SCNC: 23.3 MMOL/L (ref 24–30)
HCT VFR BLD AUTO: 46.3 % (ref 34.8–46.1)
HGB BLD-MCNC: 15.6 G/DL (ref 11.5–15.4)
HGB UR QL STRIP.AUTO: NEGATIVE
IMM GRANULOCYTES # BLD AUTO: 0.01 THOUSAND/UL (ref 0–0.2)
IMM GRANULOCYTES NFR BLD AUTO: 0 % (ref 0–2)
KETONES UR STRIP-MCNC: NEGATIVE MG/DL
LACTATE SERPL-SCNC: 1.4 MMOL/L (ref 0.5–2)
LEUKOCYTE ESTERASE UR QL STRIP: NEGATIVE
LYMPHOCYTES # BLD AUTO: 2.99 THOUSANDS/ÂΜL (ref 0.6–4.47)
LYMPHOCYTES NFR BLD AUTO: 42 % (ref 14–44)
MCH RBC QN AUTO: 31.6 PG (ref 26.8–34.3)
MCHC RBC AUTO-ENTMCNC: 33.7 G/DL (ref 31.4–37.4)
MCV RBC AUTO: 94 FL (ref 82–98)
MONOCYTES # BLD AUTO: 0.24 THOUSAND/ÂΜL (ref 0.17–1.22)
MONOCYTES NFR BLD AUTO: 3 % (ref 4–12)
NEUTROPHILS # BLD AUTO: 3.67 THOUSANDS/ÂΜL (ref 1.85–7.62)
NEUTS SEG NFR BLD AUTO: 53 % (ref 43–75)
NITRITE UR QL STRIP: NEGATIVE
NRBC BLD AUTO-RTO: 0 /100 WBCS
O2 CT BLDV-SCNC: 10.8 ML/DL
OPIATES UR QL SCN: NEGATIVE
OXYCODONE+OXYMORPHONE UR QL SCN: NEGATIVE
P AXIS: 69 DEGREES
PCO2 BLDV: 48.7 MM HG (ref 42–50)
PCP UR QL: NEGATIVE
PH BLDV: 7.3 [PH] (ref 7.3–7.4)
PH UR STRIP.AUTO: 6 [PH]
PLATELET # BLD AUTO: 194 THOUSANDS/UL (ref 149–390)
PMV BLD AUTO: 9.5 FL (ref 8.9–12.7)
PO2 BLDV: 29.5 MM HG (ref 35–45)
POTASSIUM SERPL-SCNC: 4.1 MMOL/L (ref 3.5–5.3)
PR INTERVAL: 118 MS
PROT SERPL-MCNC: 8.5 G/DL (ref 6.4–8.4)
PROT UR STRIP-MCNC: NEGATIVE MG/DL
QRS AXIS: 77 DEGREES
QRSD INTERVAL: 66 MS
QT INTERVAL: 334 MS
QTC INTERVAL: 433 MS
RBC # BLD AUTO: 4.94 MILLION/UL (ref 3.81–5.12)
SALICYLATES SERPL-MCNC: <5 MG/DL (ref 3–20)
SODIUM SERPL-SCNC: 140 MMOL/L (ref 135–147)
SP GR UR STRIP.AUTO: <=1.005
T WAVE AXIS: 51 DEGREES
THC UR QL: NEGATIVE
TSH SERPL DL<=0.05 MIU/L-ACNC: 0.13 UIU/ML (ref 0.45–4.5)
UROBILINOGEN UR QL STRIP.AUTO: 0.2 E.U./DL
VENTRICULAR RATE: 101 BPM
WBC # BLD AUTO: 7.08 THOUSAND/UL (ref 4.31–10.16)

## 2023-10-08 PROCEDURE — 81003 URINALYSIS AUTO W/O SCOPE: CPT | Performed by: EMERGENCY MEDICINE

## 2023-10-08 PROCEDURE — 80179 DRUG ASSAY SALICYLATE: CPT | Performed by: EMERGENCY MEDICINE

## 2023-10-08 PROCEDURE — 36415 COLL VENOUS BLD VENIPUNCTURE: CPT | Performed by: EMERGENCY MEDICINE

## 2023-10-08 PROCEDURE — 84484 ASSAY OF TROPONIN QUANT: CPT | Performed by: EMERGENCY MEDICINE

## 2023-10-08 PROCEDURE — 80053 COMPREHEN METABOLIC PANEL: CPT | Performed by: EMERGENCY MEDICINE

## 2023-10-08 PROCEDURE — 96374 THER/PROPH/DIAG INJ IV PUSH: CPT

## 2023-10-08 PROCEDURE — 96361 HYDRATE IV INFUSION ADD-ON: CPT

## 2023-10-08 PROCEDURE — 82805 BLOOD GASES W/O2 SATURATION: CPT | Performed by: EMERGENCY MEDICINE

## 2023-10-08 PROCEDURE — 99283 EMERGENCY DEPT VISIT LOW MDM: CPT

## 2023-10-08 PROCEDURE — 80307 DRUG TEST PRSMV CHEM ANLYZR: CPT | Performed by: EMERGENCY MEDICINE

## 2023-10-08 PROCEDURE — 99285 EMERGENCY DEPT VISIT HI MDM: CPT | Performed by: EMERGENCY MEDICINE

## 2023-10-08 PROCEDURE — 93005 ELECTROCARDIOGRAM TRACING: CPT

## 2023-10-08 PROCEDURE — 85025 COMPLETE CBC W/AUTO DIFF WBC: CPT | Performed by: EMERGENCY MEDICINE

## 2023-10-08 PROCEDURE — 83605 ASSAY OF LACTIC ACID: CPT | Performed by: EMERGENCY MEDICINE

## 2023-10-08 PROCEDURE — 93010 ELECTROCARDIOGRAM REPORT: CPT | Performed by: INTERNAL MEDICINE

## 2023-10-08 PROCEDURE — 82077 ASSAY SPEC XCP UR&BREATH IA: CPT | Performed by: EMERGENCY MEDICINE

## 2023-10-08 PROCEDURE — 84443 ASSAY THYROID STIM HORMONE: CPT | Performed by: EMERGENCY MEDICINE

## 2023-10-08 PROCEDURE — 80143 DRUG ASSAY ACETAMINOPHEN: CPT | Performed by: EMERGENCY MEDICINE

## 2023-10-08 RX ORDER — ONDANSETRON 2 MG/ML
4 INJECTION INTRAMUSCULAR; INTRAVENOUS ONCE
Status: COMPLETED | OUTPATIENT
Start: 2023-10-08 | End: 2023-10-08

## 2023-10-08 RX ADMIN — ONDANSETRON 4 MG: 2 INJECTION INTRAMUSCULAR; INTRAVENOUS at 03:13

## 2023-10-08 RX ADMIN — SODIUM CHLORIDE 1000 ML: 0.9 INJECTION, SOLUTION INTRAVENOUS at 02:51

## 2023-10-08 NOTE — ED PROVIDER NOTES
History  Chief Complaint   Patient presents with   • Vomiting     Ate unknown berries, thought they were baby "cherries". Now vomiting. Thumb approximately 2 hours prior to arrival.  She stated she started to feel sick within 10 minutes of ingesting them. Stated she took approximately 1 handful. She does not have any other remaining berries or ability to take pictures or show what. She had. Vomiting has been nonbloody nonbilious. She is not having pain anywhere. She states that she is actually ingested toxic berries before as a child and felt ill as a consequence. Patient notes she has chronic nausea and is actually on Zofran every day and has a follow-up with GI. She states this is worse, more acute. Patient states that she missed her morning dose of Zofran. Prior to Admission Medications   Prescriptions Last Dose Informant Patient Reported? Taking? Xulane 150-35 MCG/24HR   Yes No   Sig: APPLY 1 PATCH ONE TIME PER WEEK   Patient not taking: Reported on 3/11/2023   albuterol (PROVENTIL HFA,VENTOLIN HFA) 90 mcg/act inhaler   Yes No   Sig: Inhale 2 puffs every 6 (six) hours as needed for wheezing     ergocalciferol (VITAMIN D2) 50,000 units   Yes No   Sig: TAKE 1 CAPSULE BY MOUTH ONE TIME PER WEEK   ferrous sulfate 325 (65 Fe) mg tablet   Yes No   methocarbamol (ROBAXIN) 500 mg tablet   Yes No   Sig: TAKE 1 TABLET BY MOUTH 4 TIMES A DAY AS NEEDED FOR MUSCLE SPASMS. naproxen (NAPROSYN) 500 mg tablet   Yes No   Sig: TAKE 1 TABLET BY MOUTH 2 TIMES A DAY AS NEEDED FOR MODERATE PAIN (PAIN SCORE 4-6).  TAKE WITH MEALS.   ondansetron (Zofran ODT) 4 mg disintegrating tablet   No No   Sig: Take 1 tablet (4 mg total) by mouth every 6 (six) hours as needed for nausea or vomiting      Facility-Administered Medications: None       Past Medical History:   Diagnosis Date   • Anxiety    • Asthma    • Back pain    • Depression    • POTS (postural orthostatic tachycardia syndrome)        Past Surgical History: Procedure Laterality Date   • TONSILLECTOMY     • TONSILLECTOMY         Family History   Problem Relation Age of Onset   • Bipolar disorder Mother    • Scoliosis Mother    • Allergy (severe) Sister    • Kidney disease Brother    • Breast cancer Maternal Aunt    • Diabetes Maternal Grandmother      I have reviewed and agree with the history as documented. E-Cigarette/Vaping   • E-Cigarette Use Never User      E-Cigarette/Vaping Substances   • Nicotine No    • THC No    • CBD No    • Flavoring No    • Other No    • Unknown No      Social History     Tobacco Use   • Smoking status: Former     Packs/day: 0.25     Types: Cigarettes   • Smokeless tobacco: Never   • Tobacco comments:     one cigarette a day   Vaping Use   • Vaping Use: Never used   Substance Use Topics   • Alcohol use: Not Currently   • Drug use: Not Currently     Comment: previopus use of meth. Review of Systems   Constitutional: Negative for activity change, fatigue and fever. HENT: Negative for congestion. Eyes: Negative for visual disturbance. Respiratory: Negative for cough, chest tightness and shortness of breath. Cardiovascular: Negative for chest pain. Gastrointestinal: Positive for vomiting. Negative for abdominal pain and diarrhea. Genitourinary: Negative for dysuria. Skin: Negative for rash. Neurological: Negative for dizziness, weakness and numbness. Physical Exam  Physical Exam  Constitutional:       Appearance: She is well-developed. She is not diaphoretic. Comments:  Closer inspection of the vomit demonstrates mostly saliva. HENT:      Head: Normocephalic and atraumatic. Right Ear: External ear normal.      Left Ear: External ear normal.      Nose: Nose normal.      Mouth/Throat:      Mouth: Mucous membranes are moist.      Pharynx: Oropharynx is clear. Eyes:      Conjunctiva/sclera: Conjunctivae normal.      Pupils: Pupils are equal, round, and reactive to light.    Cardiovascular:      Rate and Rhythm: Normal rate and regular rhythm. Heart sounds: Normal heart sounds. Pulmonary:      Effort: Pulmonary effort is normal. No respiratory distress. Breath sounds: Normal breath sounds. Abdominal:      General: Bowel sounds are normal. There is no distension. Palpations: Abdomen is soft. Tenderness: There is no abdominal tenderness. There is no right CVA tenderness, left CVA tenderness, guarding or rebound. Musculoskeletal:         General: Normal range of motion. Cervical back: Normal range of motion and neck supple. Skin:     General: Skin is warm and dry. Capillary Refill: Capillary refill takes less than 2 seconds. Neurological:      General: No focal deficit present. Mental Status: She is alert and oriented to person, place, and time.    Psychiatric:         Behavior: Behavior normal.         Vital Signs  ED Triage Vitals [10/08/23 0230]   Temperature Pulse Respirations Blood Pressure SpO2   98.6 °F (37 °C) (!) 124 22 140/100 97 %      Temp Source Heart Rate Source Patient Position - Orthostatic VS BP Location FiO2 (%)   Tympanic Monitor Sitting Left arm --      Pain Score       No Pain           Vitals:    10/08/23 0230 10/08/23 0252   BP: 140/100 118/70   Pulse: (!) 124 79   Patient Position - Orthostatic VS: Sitting Lying         Visual Acuity      ED Medications  Medications   sodium chloride 0.9 % bolus 1,000 mL (0 mL Intravenous Stopped 10/8/23 0415)   ondansetron (ZOFRAN) injection 4 mg (4 mg Intravenous Given 10/8/23 0313)       Diagnostic Studies  Results Reviewed     Procedure Component Value Units Date/Time    HS Troponin I 4hr [[id number]]     Lab Status: No result Specimen: Blood     Rapid drug screen, urine [[id number]] Collected: 10/08/23 0333    Lab Status: Final result Specimen: Urine, Clean Catch Updated: 10/08/23 0401     Amph/Meth UR Negative     Barbiturate Ur Negative     Benzodiazepine Urine Negative     Cocaine Urine Negative     Methadone Urine --     Opiate Urine Negative     PCP Ur Negative     THC Urine Negative     Oxycodone Urine Negative    Narrative:      FOR MEDICAL PURPOSES ONLY. IF CONFIRMATION NEEDED PLEASE CONTACT THE LAB WITHIN 5 DAYS.     Drug Screen Cutoff Levels:  AMPHETAMINE/METHAMPHETAMINES  1000 ng/mL  BARBITURATES     200 ng/mL  BENZODIAZEPINES     200 ng/mL  COCAINE      300 ng/mL  METHADONE      300 ng/mL  OPIATES      300 ng/mL  PHENCYCLIDINE     25 ng/mL  THC       50 ng/mL  OXYCODONE      100 ng/mL    Ethanol [[id number]]  (Abnormal) Collected: 10/08/23 0248    Lab Status: Final result Specimen: Blood from Arm, Right Updated: 10/08/23 0341     Ethanol Lvl 160 mg/dL     UA w Reflex to Microscopic w Reflex to Culture [[id number]]  (Abnormal) Collected: 10/08/23 0333    Lab Status: Final result Specimen: Urine, Clean Catch Updated: 10/08/23 0341     Color, UA Straw     Clarity, UA Clear     Specific Gravity, UA <=1.005     pH, UA 6.0     Leukocytes, UA Negative     Nitrite, UA Negative     Protein, UA Negative mg/dl      Glucose, UA Negative mg/dl      Ketones, UA Negative mg/dl      Urobilinogen, UA 0.2 E.U./dl      Bilirubin, UA Negative     Occult Blood, UA Negative    TSH [[id number]]  (Abnormal) Collected: 10/08/23 0248    Lab Status: Final result Specimen: Blood from Arm, Right Updated: 10/08/23 0332     TSH 3RD GENERATON 0.130 uIU/mL     HS Troponin 0hr (reflex protocol) [[id number]]  (Normal) Collected: 10/08/23 0248    Lab Status: Final result Specimen: Blood from Arm, Right Updated: 10/08/23 0324     hs TnI 0hr 3 ng/L     HS Troponin I 2hr [[id number]]     Lab Status: No result Specimen: Blood     Comprehensive metabolic panel [[id number]]  (Abnormal) Collected: 10/08/23 0248    Lab Status: Final result Specimen: Blood from Arm, Right Updated: 10/08/23 0319     Sodium 140 mmol/L      Potassium 4.1 mmol/L      Chloride 106 mmol/L      CO2 23 mmol/L      ANION GAP 11 mmol/L      BUN 10 mg/dL      Creatinine 0.73 mg/dL Glucose 95 mg/dL      Calcium 9.8 mg/dL      AST 18 U/L      ALT 13 U/L      Alkaline Phosphatase 51 U/L      Total Protein 8.5 g/dL      Albumin 5.3 g/dL      Total Bilirubin 0.43 mg/dL      eGFR 118 ml/min/1.73sq m     Narrative:      [facility] ([facility]D):   •  Stage 1 with normal or high GFR (GFR > 90 mL/min/1.73 square meters)  •  Stage 2 Mild CKD (GFR = 60-89 mL/min/1.73 square meters)  •  Stage 3A Moderate CKD (GFR = 45-59 mL/min/1.73 square meters)  •  Stage 3B Moderate CKD (GFR = 30-44 mL/min/1.73 square meters)  •  Stage 4 Severe CKD (GFR = 15-29 mL/min/1.73 square meters)  •  Stage 5 End Stage CKD (GFR <15 mL/min/1.73 square meters)  Note: GFR calculation is accurate only with a steady state creatinine    Lactic acid, plasma (w/reflex if result > 2.0) [[id number]]  (Normal) Collected: 10/08/23 0248    Lab Status: Final result Specimen: Blood from Arm, Right Updated: 10/08/23 0318     LACTIC ACID 1.4 mmol/L     Narrative:      Result may be elevated if tourniquet was used during collection. Salicylate level [[id number]]  (Normal) Collected: 10/08/23 0248    Lab Status: Final result Specimen: Blood from Arm, Right Updated: 68/98/61 4526     Salicylate Lvl <5 mg/dL     Acetaminophen level-If concentration is detectable, please discuss with medical  on call.  [[id number]]  (Abnormal) Collected: 10/08/23 0248    Lab Status: Final result Specimen: Blood from Arm, Right Updated: 10/08/23 0318     Acetaminophen Level <10 ug/mL     Blood gas, venous [[id number]]  (Abnormal) Collected: 10/08/23 0248    Lab Status: Final result Specimen: Blood from Arm, Right Updated: 10/08/23 0300     pH, Tristin 7.298     pCO2, Trist[place] 48.7 mm Hg      pO2, [place] 29.5 mm Hg      HCO3, [place] 23.3 mmol/L      Base Excess, Tristin -3.6 mmol/L      O2 Content, Tristin 10.8 ml/dL      O2 HGB, VENOUS 49.6 %     CBC and differential [[id number]]  (Abnormal) Collected: 10/08/23 0248    Lab Status: Final result Specimen: Blood from Arm, Right Updated: 10/08/23 0259     WBC 7.08 Thousand/uL      RBC 4.94 Million/uL      Hemoglobin 15.6 g/dL      Hematocrit 46.3 %      MCV 94 fL      MCH 31.6 pg      MCHC 33.7 g/dL      RDW 12.1 %      MPV 9.5 fL      Platelets 457 Thousands/uL      nRBC 0 /100 WBCs      Neutrophils Relative 53 %      Immat GRANS % 0 %      Lymphocytes Relative 42 %      Monocytes Relative 3 %      Eosinophils Relative 2 %      Basophils Relative 0 %      Neutrophils Absolute 3.67 Thousands/µL      Immature Grans Absolute 0.01 Thousand/uL      Lymphocytes Absolute 2.99 Thousands/µL      Monocytes Absolute 0.24 Thousand/µL      Eosinophils Absolute 0.14 Thousand/µL      Basophils Absolute 0.03 Thousands/µL     POCT pregnancy, urine [[id number]]     Lab Status: No result Specimen: Urine                  No orders to display              Procedures  CriticalCare Time    Date/Time: 10/8/2023 6:10 AM    Performed by: Rody Cedillo MD  Authorized by: Rody Cedillo MD    Critical care provider statement:     Critical care time (minutes):  30    Critical care time was exclusive of:  Separately billable procedures and treating other patients and teaching time    Critical care was necessary to treat or prevent imminent or life-threatening deterioration of the following conditions:  Toxidrome    Critical care was time spent personally by me on the following activities:  Blood draw for specimens, obtaining history from patient or surrogate, development of treatment plan with patient or surrogate, evaluation of patient's response to treatment, examination of patient, interpretation of cardiac output measurements, ordering and performing treatments and interventions, ordering and review of laboratory studies, ordering and review of radiographic studies, re-evaluation of patient's condition, review of old charts and discussions with consultants    I assumed direction of critical care for this patient from another provider in my specialty: no    Comments:      Case discussed with [facility] of poison control based out of [facility]. Agrees with plan of care. ED Course  ED Course as of 10/08/23 0612   Rachel Jane Oct 08, 2023   0636 [facility] with poison control. Agrees with plan for care. Will touch base when labs return. Medical Decision Making  64-year-old female with vomiting, likely secondary to toxic dairy ingestion. Patient given extensive work-up. Patient responded extremely well to single dose of Zofran. Patient given fluids. Vital signs normalized after patient became more relaxed. The patient has decided to leave against medical advice, stating reasoning of not wishing to stay in hospital. I have assessed that the patient has adequate capacity to make this medical decision and patient did not appear intoxicated and denies any recent substance abuse including but not limited to alcohol or drugs. The patient refuses my recommended plan of care. The risks have been explained to the patient, including death, and long-term disability, pain and suffering. The benefits of my plan of care have also been explained, including the availability and proximity of nurses, physicians, monitoring, diagnostic testing, and treatment. The patient was able to understand and state the rationale I gave for my plan of care and the risks of refusing this plan of care. The had the opportunity to ask questions about their medical condition, and was treated to the extent that they would allow and knows that they may return to this or any other emergency department for care at any time if they change their mind, or their clinical condition changes.  I have emphasized that if they do not return to an emergency department then they should at least seek care from a medical professional immediately, though they understand this is not a substitute for emergency care and that they may suffer irreversible harm or death if they obtain non-emergency care. Nausea and vomiting, unspecified vomiting type: chronic illness or injury  Toxic effect of ingested food: acute illness or injury  Amount and/or Complexity of Data Reviewed  External Data Reviewed: notes. Labs: ordered. ECG/medicine tests: ordered and independent interpretation performed. Risk  Prescription drug management. Disposition  Final diagnoses:   Toxic effect of ingested food   Nausea and vomiting, unspecified vomiting type     Time reflects when diagnosis was documented in both MDM as applicable and the Disposition within this note     Time User Action Codes Description Comment    10/8/2023  3:59 AM Melissa Castillo Add [T62.91XA] Toxic effect of ingested food     10/8/2023  3:59 AM Melissa Castillo Add [R11.2] Nausea and vomiting, unspecified vomiting type       ED Disposition     ED Disposition   AMA    Condition   --    Date/Time   Sun Oct 8, 2023  3:59 AM    Comment   Date: 10/8/2023  Patient: Francisco Tirado  Admitted: 10/8/2023  2:25 AM  Attending Provider: Jamar Pastor MD    Francisco Tirado or her authorized caregiver has made the decision for the patient to leave the emergency department against the ad vice of her attending physician. She or her authorized caregiver has been informed and understands the inherent risks, including death, pain, suffering. She or her authorized caregiver has decided to accept the responsibility for this decision. Pratima Rodríguez and all necessary parties have been advised that she may return for further evaluation or treatment. Her condition at time of discharge was improved.   Francisco Tirado had current vital signs as follows:  /70 (BP Location: Left a rm)   Pulse 79   Temp 98.4 °F (36.9 °C) (Tympanic)   Resp 20   Ht 5' 1" (1.549 m)   Wt 60.8 kg (134 lb)            Follow-up Information     Follow up With Specialties Details Why Contact Info    pcp 1 day          Discharge Medication List as of 10/8/2023  3:59 AM      CONTINUE these medications which have NOT CHANGED    Details   albuterol (PROVENTIL HFA,VENTOLIN HFA) 90 mcg/act inhaler Inhale 2 puffs every 6 (six) hours as needed for wheezing  , Historical Med      ergocalciferol (VITAMIN D2) 50,000 units TAKE 1 CAPSULE BY MOUTH ONE TIME PER WEEK, Historical Med      ferrous sulfate 325 (65 Fe) mg tablet Starting Mon 3/28/2022, Historical Med      methocarbamol (ROBAXIN) 500 mg tablet TAKE 1 TABLET BY MOUTH 4 TIMES A DAY AS NEEDED FOR MUSCLE SPASMS., Historical Med      naproxen (NAPROSYN) 500 mg tablet TAKE 1 TABLET BY MOUTH 2 TIMES A DAY AS NEEDED FOR MODERATE PAIN (PAIN SCORE 4-6). TAKE WITH MEALS., Historical Med      ondansetron (Zofran ODT) 4 mg disintegrating tablet Take 1 tablet (4 mg total) by mouth every 6 (six) hours as needed for nausea or vomiting, Starting Fri 6/24/2022, Normal      Xulane 150-35 MCG/24HR APPLY 1 PATCH ONE TIME PER WEEK, Historical Med             No discharge procedures on file.     PDMP Review     None          ED Provider  Electronically Signed by           Zeke Mayers MD  10/08/23 0074

## 2023-10-11 ENCOUNTER — OFFICE VISIT (OUTPATIENT)
Dept: URGENT CARE | Facility: CLINIC | Age: 18
End: 2023-10-11
Payer: COMMERCIAL

## 2023-10-11 VITALS
WEIGHT: 135 LBS | OXYGEN SATURATION: 98 % | RESPIRATION RATE: 18 BRPM | SYSTOLIC BLOOD PRESSURE: 108 MMHG | TEMPERATURE: 98 F | BODY MASS INDEX: 25.51 KG/M2 | DIASTOLIC BLOOD PRESSURE: 60 MMHG | HEART RATE: 62 BPM

## 2023-10-11 DIAGNOSIS — H65.191 ACUTE MEE (MIDDLE EAR EFFUSION), RIGHT: Primary | ICD-10-CM

## 2023-10-11 PROCEDURE — 99214 OFFICE O/P EST MOD 30 MIN: CPT | Performed by: NURSE PRACTITIONER

## 2023-10-11 RX ORDER — PREDNISONE 10 MG/1
TABLET ORAL
Qty: 24 TABLET | Refills: 0 | Status: SHIPPED | OUTPATIENT
Start: 2023-10-11

## 2023-10-11 NOTE — PATIENT INSTRUCTIONS
Your right ear is not infected but does have some fluid in it. You are to take the prednisone as prescribed. Take an allergy medication such as claritin or zyrtec.   You are to follow up with your PCP in 3-5 days   Go to the ED if symptoms worsen

## 2023-10-11 NOTE — PROGRESS NOTES
[facility]        NAME: Gracia Guerin is a 19 y.o. female  : 2005    MRN: [id number]  DATE: 2023  TIME: 5:14 PM    Assessment and Plan   Acute MARY (middle ear effusion), right [H65.191]  1. Acute MARY (middle ear effusion), right  predniSONE 10 mg tablet            Patient Instructions       Follow up with PCP in 3-5 days. Proceed to  ER if symptoms worsen. Your right ear is not infected but does have some fluid in it. You are to take the prednisone as prescribed. Take an allergy medication such as claritin or zyrtec. You are to follow up with your PCP in 3-5 days   Go to the ED if symptoms worsen        Chief Complaint     Chief Complaint   Patient presents with    Earache     Ear pain and difficulty (muffled) hearing out of R ear x 1.5 months         History of Present Illness       19year old female who states has had a right ear ache for 1.5 months. She states her hearing is muffled. She states that she has not taken anything for it and has not seen anyone. She thinks it is infected. She states she has also had nausea and vomiting but is being worked up by GI and has an appt this week. She denies pregnancy as her PCP did a pregnancy test with her last blood work 1 month ago. Review of Systems   Review of Systems   Constitutional: Negative. HENT:  Positive for ear pain. Eyes: Negative. Respiratory: Negative. Cardiovascular: Negative. Gastrointestinal:  Positive for nausea and vomiting. Endocrine: Negative. Genitourinary: Negative. Musculoskeletal: Negative. Skin: Negative. Allergic/Immunologic: Negative. Neurological: Negative. Hematological: Negative. Psychiatric/Behavioral: Negative.            Current Medications       Current Outpatient Medications:     albuterol (PROVENTIL HFA,VENTOLIN HFA) 90 mcg/act inhaler, Inhale 2 puffs every 6 (six) hours as needed for wheezing  , Disp: , Rfl:     predniSONE 10 mg tablet, Take 5 tabs po x 2 days; 4 tabs po x 2 days; 3 tabs po x 1 day; 2 tabs po x 1 day. 1 tab po x 1 day., Disp: 24 tablet, Rfl: 0    ergocalciferol (VITAMIN D2) 50,000 units, TAKE 1 CAPSULE BY MOUTH ONE TIME PER WEEK, Disp: , Rfl:     ferrous sulfate 325 (65 Fe) mg tablet, , Disp: , Rfl:     methocarbamol (ROBAXIN) 500 mg tablet, TAKE 1 TABLET BY MOUTH 4 TIMES A DAY AS NEEDED FOR MUSCLE SPASMS., Disp: , Rfl:     naproxen (NAPROSYN) 500 mg tablet, TAKE 1 TABLET BY MOUTH 2 TIMES A DAY AS NEEDED FOR MODERATE PAIN (PAIN SCORE 4-6). TAKE WITH MEALS., Disp: , Rfl:     ondansetron (Zofran ODT) 4 mg disintegrating tablet, Take 1 tablet (4 mg total) by mouth every 6 (six) hours as needed for nausea or vomiting, Disp: 16 tablet, Rfl: 0    Xulane 150-35 MCG/24HR, APPLY 1 PATCH ONE TIME PER WEEK (Patient not taking: Reported on 3/11/2023), Disp: , Rfl:     Current Allergies     Allergies as of 10/11/2023 - Reviewed 10/11/2023   Allergen Reaction Noted    Latex Rash 04/12/2019            The following portions of the patient's history were reviewed and updated as appropriate: allergies, current medications, past family history, past medical history, past social history, past surgical history and problem list.     Past Medical History:   Diagnosis Date    Anxiety     Asthma     Back pain     Depression     POTS (postural orthostatic tachycardia syndrome)        Past Surgical History:   Procedure Laterality Date    TONSILLECTOMY      TONSILLECTOMY         Family History   Problem Relation Age of Onset    Bipolar disorder Mother     Scoliosis Mother     Allergy (severe) Sister     Kidney disease Brother     Breast cancer Maternal Aunt     Diabetes Maternal Grandmother          Medications have been verified. Objective   /60   Pulse 62   Temp 98 °F (36.7 °C)   Resp 18   Wt 61.2 kg (135 lb)   SpO2 98%   BMI 25.51 kg/m²   No LMP recorded. Physical Exam     Physical Exam  Vitals and nursing note reviewed. Constitutional:       General: She is not in acute distress. Appearance: Normal appearance. She is normal weight. She is not ill-appearing, toxic-appearing or diaphoretic. HENT:      Head: Normocephalic and atraumatic. Left Ear: Tympanic membrane and ear canal normal.      Ears:      Comments: Right TM with fluid - no redness and does not appear to be infected      Nose: Nose normal. No congestion or rhinorrhea. Mouth/Throat:      Mouth: Mucous membranes are moist.      Pharynx: Oropharynx is clear. No oropharyngeal exudate or posterior oropharyngeal erythema. Eyes:      Extraocular Movements: Extraocular movements intact. Cardiovascular:      Rate and Rhythm: Normal rate and regular rhythm. Pulses: Normal pulses. Heart sounds: Normal heart sounds. Pulmonary:      Effort: Pulmonary effort is normal.      Breath sounds: Normal breath sounds. Musculoskeletal:         General: Normal range of motion. Cervical back: Normal range of motion and neck supple. Skin:     General: Skin is warm and dry. Capillary Refill: Capillary refill takes less than 2 seconds. Neurological:      General: No focal deficit present. Mental Status: She is alert and oriented to person, place, and time. Psychiatric:         Mood and Affect: Mood normal.         Behavior: Behavior normal.         Thought Content: Thought content normal.         Judgment: Judgment normal.         Nausea and vomiting deferred to GI as she has appt this week. Pt informed of this and agrees.

## 2023-10-25 ENCOUNTER — OFFICE VISIT (OUTPATIENT)
Age: 18
End: 2023-10-25
Payer: COMMERCIAL

## 2023-10-25 VITALS
RESPIRATION RATE: 17 BRPM | DIASTOLIC BLOOD PRESSURE: 70 MMHG | SYSTOLIC BLOOD PRESSURE: 110 MMHG | WEIGHT: 134 LBS | BODY MASS INDEX: 25.3 KG/M2 | TEMPERATURE: 98.2 F | OXYGEN SATURATION: 98 % | HEIGHT: 61 IN | HEART RATE: 78 BPM

## 2023-10-25 DIAGNOSIS — R11.2 NAUSEA AND VOMITING, UNSPECIFIED VOMITING TYPE: ICD-10-CM

## 2023-10-25 DIAGNOSIS — R10.13 EPIGASTRIC PAIN: ICD-10-CM

## 2023-10-25 DIAGNOSIS — K21.9 GASTROESOPHAGEAL REFLUX DISEASE, UNSPECIFIED WHETHER ESOPHAGITIS PRESENT: ICD-10-CM

## 2023-10-25 DIAGNOSIS — K59.00 CONSTIPATION, UNSPECIFIED CONSTIPATION TYPE: ICD-10-CM

## 2023-10-25 DIAGNOSIS — R13.10 DYSPHAGIA, UNSPECIFIED TYPE: Primary | ICD-10-CM

## 2023-10-25 PROCEDURE — 99244 OFF/OP CNSLTJ NEW/EST MOD 40: CPT | Performed by: NURSE PRACTITIONER

## 2023-10-25 RX ORDER — FAMOTIDINE 20 MG/1
TABLET, FILM COATED ORAL
Qty: 30 TABLET | Refills: 2 | Status: SHIPPED | OUTPATIENT
Start: 2023-10-25

## 2023-10-25 RX ORDER — PANTOPRAZOLE SODIUM 40 MG/1
TABLET, DELAYED RELEASE ORAL
Qty: 30 TABLET | Refills: 2 | Status: SHIPPED | OUTPATIENT
Start: 2023-10-25

## 2023-10-25 RX ORDER — MEDROXYPROGESTERONE ACETATE 150 MG/ML
150 INJECTION, SUSPENSION INTRAMUSCULAR
COMMUNITY

## 2023-10-25 NOTE — PROGRESS NOTES
[facility] - Outpatient Consultation  Cristel Yu 19 y.o. female MRN: [id number]  Encounter: [id number]          ASSESSMENT AND PLAN:    The patient presents today for an initial consultation for her chronic nausea, vomiting and worsening dysphagia. Exam:  Oral mucosa normal upon visual inspection, without any sores, lesions, or ulcerations. Sclera without icterus and benign. Lung sounds CTA b/l. Normal S1 & S2 upon exam. Abdomen is soft, flat, nondistended with mild to moderate epigastric and left greater than right lower quadrant pain with minimal guarding, without rebound tenderness, with very faint bowel sounds x4. No edema noted of the b/l lower extremities upon exam today. Skin is non-icteric. 1. Dysphagia, unspecified type  2. Epigastric pain  3. Gastroesophageal reflux disease, unspecified whether esophagitis present  4. Nausea and vomiting, unspecified vomiting type  While the patient and her mother were in the office today, I discussed with them that at this point time with the worsening reflux, dysphagia, nausea, epigastric pain, and vomiting I feel would be beneficial to proceed with an EGD to evaluate for any underlying etiology that could explain her pain and symptoms including but not limited to; H. pylori, gastric ulcer, GERD, EOE, etc.    I discussed the risks of procedure with the patient including, but not limited to: bleeding, infection, sore throat, and perforation. The patient gave verbal understanding and is agreeable to proceed. To address the underlying reflux symptoms I would like to start the patient on Protonix 40 mg daily in the morning 30 minutes prior to a meal and famotidine 20 mg at bedtime and see how she does for now. I advised the patient and her mother that if she has any side effects or issues with the changes in her medication regimen, she should contact our office.   The patient was agreeable and verbalized an understanding. Encouraged the patient to avoid trigger foods.    - EGD; Future  - pantoprazole (PROTONIX) 40 mg tablet; Take 1 PO QD in AM 30 mins prior to a robert. Dispense: 30 tablet; Refill: 2  - famotidine (PEPCID) 20 mg tablet; Take 1 PO HS. Dispense: 30 tablet; Refill: 2    5. Constipation, unspecified constipation type  I discussed with the patient that at this point in time even though I feel her constipation is also something that needs to be significantly addressed, I do not feel she would be able to tolerate the oral bowel prep to consider colonoscopy and I feel that starting her on the medication for constipation while starting her on 2 other new medications at the same time would be unwise and for now have encouraged the patient to try to continue to drink at least 64 ounces of water daily and to work on eating higher fiber foods to help encourage more consistent bowel movements. I discussed with the patient that also by trying to address the reflux symptoms, it may also potentially help with her constipation as well. The patient was agreeable and verbalized an understanding. The patient will schedule a follow up office visit after her EGD. The patient was agreeable and verbalized an understanding.     ______________________________________________________________________    HPI: The patient is a 19 y.o. female who presents today for a consultation regarding her chronic nausea, vomiting and worsening dysphagia. The patient reports that over the past 2 years she has dealt with chronic nausea, vomiting, and dysphagia but that over the past several months it has been getting worse. She continues with epigastric pain, constant bloating, lower quadrant pain and cramping, especially after she eats, every time she eats.   The patient reports that she feels the food gets stuck both in her oropharynx and at her sternum and can be quite painful and either she throws it back up or after a while it seems to pass and the irritation or pain improves. The patient has also dealt with constant and chronic constipation since she was a child and at best has 2-3 bowel movements a month. She reports that this is an improvement as it typically was only 1 bowel movement a month prior to getting pregnant and having her son. She reports that in the past she has tried MiraLAX, mag citrate, Colace, Dulcolax, and Senokot, none of which have provided any significant relief or improvement in her constipation and only seem to make the abdominal pain and bloating significantly worse. The patient denies any decreased appetite, unplanned weight loss, or abdominal pain. Water Intake: 4 bottles daily. The patient denies eating any raw or uncooked fish, seafood, or sushi in the past 6-8 months. The patient reports that they have a BM only 2-3 x month and reports that it is relieving, without any consistent diarrhea, nocturnal BMs, or bloody stools. Last BM: Today. Flatus: Yes. PMH/PSH:   Abdominal/Chest Surgery: Denied  Colon Cancer: Denied  Any Cancer: Denied  Pre-Cancerous Polyps: Denied  Crohn's: Denied  Ulcerative Colitis: Denied    Tobacco/Vapin ciggs/day and is currently working on [employer] [place]. ETOH: Denied  Marijuana: Denied  Illicit Drug Use: Denied    FH:  Colon Cancer: Denied  Any Cancer: Denied  Family Members with Pre-Cancerous Polyps: Denied  Crohn's: Paternal Great Aunt  Ulcerative Colitis: Denied    Meds: None  NSAID Use: Denied daily. Ibuprofen PRN. Imaging: (20) CT of the chest abdomen and pelvis with contrast: Normal.    Endoscopy History: EGD: (None):     COLONOSCOPY: (None): REVIEW OF SYSTEMS:    CONSTITUTIONAL: Denies any fever, chills, rigors, and weight loss. HEENT: No earache or tinnitus. Denies hearing loss or visual disturbances. CARDIOVASCULAR: No chest pain or palpitations.    RESPIRATORY: Denies any cough, hemoptysis, shortness of breath or dyspnea on exertion. GASTROINTESTINAL: As noted in the History of Present Illness. GENITOURINARY: No problems with urination. Denies any hematuria or dysuria. NEUROLOGIC: No dizziness or vertigo, denies headaches. MUSCULOSKELETAL: Denies any muscle or joint pain. SKIN: Denies skin rashes or itching. ENDOCRINE: Denies excessive thirst. Denies intolerance to heat or cold. PSYCHOSOCIAL: Denies depression or anxiety. Denies any recent memory loss. Historical Information   Past Medical History:   Diagnosis Date    Anxiety     Asthma     Back pain     Depression     POTS (postural orthostatic tachycardia syndrome)      Past Surgical History:   Procedure Laterality Date    TONSILLECTOMY      TONSILLECTOMY       Social History   Social History     Substance and Sexual Activity   Alcohol Use Not Currently     Social History     Substance and Sexual Activity   Drug Use Not Currently    Comment: previopus use of meth. Social History     Tobacco Use   Smoking Status Former    Packs/day: 0.25    Types: Cigarettes   Smokeless Tobacco Never   Tobacco Comments    one cigarette a day     Family History   Problem Relation Age of Onset    Bipolar disorder Mother     Scoliosis Mother     Allergy (severe) Sister     Kidney disease Brother     Breast cancer Maternal Aunt     Diabetes Maternal Grandmother        Meds/Allergies       Current Outpatient Medications:     albuterol (PROVENTIL HFA,VENTOLIN HFA) 90 mcg/act inhaler    medroxyPROGESTERone acetate (DEPO-PROVERA SYRINGE) 150 mg/mL injection    predniSONE 10 mg tablet    Allergies   Allergen Reactions    Latex Rash           Objective     Blood pressure 110/70, pulse 78, temperature 98.2 °F (36.8 °C), resp. rate 17, height 5' 1" (1.549 m), weight 60.8 kg (134 lb), SpO2 98 %, currently breastfeeding. Body mass index is 25.32 kg/m². PHYSICAL EXAM:      General Appearance:   Alert, cooperative, no distress   HEENT:   Normocephalic, atraumatic, anicteric.      Neck: Supple, symmetrical, trachea midline   Lungs:   Clear to auscultation bilaterally; no rales, rhonchi or wheezing; respirations unlabored    Heart[de-identified]   Regular rate and rhythm; no murmur, rub, or gallop. Abdomen:   Soft, non-tender, non-distended; normal bowel sounds; no masses, no organomegaly    Genitalia:   Deferred    Rectal:   Deferred    Extremities:  No cyanosis, clubbing or edema    Pulses:  2+ and symmetric    Skin:  No jaundice, rashes, or lesions    Lymph nodes:  No palpable cervical lymphadenopathy        Lab Results:   No visits with results within 1 Day(s) from this visit.    Latest known visit with results is:   Admission on 10/08/2023, Discharged on 10/08/2023   Component Date Value    WBC 10/08/2023 7.08     RBC 10/08/2023 4.94     Hemoglobin 10/08/2023 15.6 (H)     Hematocrit 10/08/2023 46.3 (H)     MCV 10/08/2023 94     MCH 10/08/2023 31.6     MCHC 10/08/2023 33.7     RDW 10/08/2023 12.1     MPV 10/08/2023 9.5     Platelets 11/81/9902 194     nRBC 10/08/2023 0     Neutrophils Relative 10/08/2023 53     Immat GRANS % 10/08/2023 0     Lymphocytes Relative 10/08/2023 42     Monocytes Relative 10/08/2023 3 (L)     Eosinophils Relative 10/08/2023 2     Basophils Relative 10/08/2023 0     Neutrophils Absolute 10/08/2023 3.67     Immature Grans Absolute 10/08/2023 0.01     Lymphocytes Absolute 10/08/2023 2.99     Monocytes Absolute 10/08/2023 0.24     Eosinophils Absolute 10/08/2023 0.14     Basophils Absolute 10/08/2023 0.03     Sodium 10/08/2023 140     Potassium 10/08/2023 4.1     Chloride 10/08/2023 106     CO2 10/08/2023 23     ANION GAP 10/08/2023 11     BUN 10/08/2023 10     Creatinine 10/08/2023 0.73     Glucose 10/08/2023 95     Calcium 10/08/2023 9.8     AST 10/08/2023 18     ALT 10/08/2023 13     Alkaline Phosphatase 10/08/2023 51     Total Protein 10/08/2023 8.5 (H)     Albumin 10/08/2023 5.3 (H)     Total Bilirubin 10/08/2023 0.43     eGFR 10/08/2023 118     hs TnI 0hr 10/08/2023 3     TSH 3RD GENERATON 10/08/2023 0.130 (L)     Color, UA 10/08/2023 Straw     Clarity, UA 10/08/2023 Clear     Specific Gravity, UA 10/08/2023 <=1.005 (L)     pH, UA 10/08/2023 6.0     Leukocytes, UA 10/08/2023 Negative     Nitrite, UA 10/08/2023 Negative     Protein, UA 10/08/2023 Negative     Glucose, UA 10/08/2023 Negative     Ketones, UA 10/08/2023 Negative     Urobilinogen, UA 10/08/2023 0.2     Bilirubin, UA 10/08/2023 Negative     Occult Blood, UA 10/08/2023 Negative     pH, [place] 10/08/2023 7.298 (L)     pCO2, [place] 10/08/2023 48.7     pO2, [place] 10/08/2023 29.5 (L)     HCO3, [place] 10/08/2023 23.3 (L)     Base Excess, [place] 10/08/2023 -3.6     O2 Content, [place] 10/08/2023 10.8     O2 HGB, VENOUS 10/08/2023 49.6 (L)     Amph/Meth UR 10/08/2023 Negative     Barbiturate Ur 10/08/2023 Negative     Benzodiazepine Urine 10/08/2023 Negative     Cocaine Urine 10/08/2023 Negative     Methadone Urine 10/08/2023      Opiate Urine 10/08/2023 Negative     PCP Ur 10/08/2023 Negative     THC Urine 10/08/2023 Negative     Oxycodone Urine 10/08/2023 Negative     Ethanol Lvl 24/92/4385 096 (H)     Salicylate Lvl 22/01/6345 <5     Acetaminophen Level 10/08/2023 <10 (L)     LACTIC ACID 10/08/2023 1.4     Ventricular Rate 10/08/2023 101     Atrial Rate 10/08/2023 101     MO Interval 10/08/2023 118     QRSD Interval 10/08/2023 66     QT Interval 10/08/2023 334     QTC Interval 10/08/2023 433     P Axis 10/08/2023 69     QRS [place] 10/08/2023 77     T Wave [place] 10/08/2023 51          Radiology Results:   No results found.

## 2023-10-25 NOTE — PATIENT INSTRUCTIONS
Start famotidine 20 mg at bed time. Start protonix 40 mg daily in AM 30 mins before a meal.   Proceed with EGD. Work on increasing water intake to as close to 64 oz daily. Schedule f/u OV after EGD. With regards to the dysphagia symptoms, I encouraged the patient to eat 4-5 small meals daily, take small bites, cut their food into small pieces, chew thoroughly, and take their time while eating to prevent any choking or aspiration. The patient verbalized an understanding.

## 2023-11-01 ENCOUNTER — HOSPITAL ENCOUNTER (EMERGENCY)
Facility: HOSPITAL | Age: 18
Discharge: HOME | End: 2023-11-01
Attending: EMERGENCY MEDICINE
Payer: COMMERCIAL

## 2023-11-01 VITALS
WEIGHT: 134.04 LBS | SYSTOLIC BLOOD PRESSURE: 125 MMHG | BODY MASS INDEX: 25.33 KG/M2 | OXYGEN SATURATION: 99 % | RESPIRATION RATE: 20 BRPM | TEMPERATURE: 98.9 F | DIASTOLIC BLOOD PRESSURE: 69 MMHG | HEART RATE: 95 BPM

## 2023-11-01 DIAGNOSIS — L02.415 ABSCESS OF RIGHT THIGH: Primary | ICD-10-CM

## 2023-11-01 PROCEDURE — 10060 I&D ABSCESS SIMPLE/SINGLE: CPT | Performed by: PHYSICIAN ASSISTANT

## 2023-11-01 PROCEDURE — 99282 EMERGENCY DEPT VISIT SF MDM: CPT

## 2023-11-01 PROCEDURE — 99284 EMERGENCY DEPT VISIT MOD MDM: CPT | Performed by: PHYSICIAN ASSISTANT

## 2023-11-01 RX ORDER — LIDOCAINE HYDROCHLORIDE AND EPINEPHRINE 10; 10 MG/ML; UG/ML
5 INJECTION, SOLUTION INFILTRATION; PERINEURAL ONCE
Status: COMPLETED | OUTPATIENT
Start: 2023-11-01 | End: 2023-11-01

## 2023-11-01 RX ORDER — SULFAMETHOXAZOLE AND TRIMETHOPRIM 800; 160 MG/1; MG/1
1 TABLET ORAL ONCE
Status: COMPLETED | OUTPATIENT
Start: 2023-11-01 | End: 2023-11-01

## 2023-11-01 RX ORDER — SULFAMETHOXAZOLE AND TRIMETHOPRIM 800; 160 MG/1; MG/1
1 TABLET ORAL 2 TIMES DAILY
Qty: 14 TABLET | Refills: 0 | Status: SHIPPED | OUTPATIENT
Start: 2023-11-01 | End: 2023-11-08

## 2023-11-01 RX ADMIN — SULFAMETHOXAZOLE AND TRIMETHOPRIM 1 TABLET: 800; 160 TABLET ORAL at 21:02

## 2023-11-01 RX ADMIN — LIDOCAINE HYDROCHLORIDE,EPINEPHRINE BITARTRATE 5 ML: 10; .01 INJECTION, SOLUTION INFILTRATION; PERINEURAL at 20:30

## 2023-11-02 NOTE — ED PROVIDER NOTES
History  Chief Complaint   Patient presents with    Medical Problem     Cyst to R-INNER THIGH  . ONSET 1 MONTH AGO     19year old female with PMH asthma, anxiety, POTS presenting with mom for evaluation of a lump on her right thigh. She notes this started about a month ago. She reports this is on right inner thigh where her thigh rubs. Area has been swollen and tender. No discharge or drainage. No fever, chills. Never had anything like this before. No personal h/o MRSA. No injury or trauma. No significant change since onset. Pt thought maybe it was just a pimple but hasn't popped. No reported aggravating or alleviating factors. No specific treatments tried. No prior evaluation since onset. ROS otherwise negative. History provided by:  Patient and medical records   used: No    Medical Problem  Associated symptoms: no abdominal pain, no chest pain, no cough, no diarrhea, no fatigue, no fever, no headaches, no myalgias, no nausea, no rash, no shortness of breath, no vomiting and no wheezing        Prior to Admission Medications   Prescriptions Last Dose Informant Patient Reported? Taking? albuterol (PROVENTIL HFA,VENTOLIN HFA) 90 mcg/act inhaler   Yes No   Sig: Inhale 2 puffs every 6 (six) hours as needed for wheezing     famotidine (PEPCID) 20 mg tablet   No No   Sig: Take 1 PO HS.   medroxyPROGESTERone acetate (DEPO-PROVERA SYRINGE) 150 mg/mL injection   Yes No   Sig: Inject 150 mg into a muscle every 3 (three) months   pantoprazole (PROTONIX) 40 mg tablet   No No   Sig: Take 1 PO QD in AM 30 mins prior to a robert.       Facility-Administered Medications: None       Past Medical History:   Diagnosis Date    Anxiety     Asthma     Back pain     Depression     POTS (postural orthostatic tachycardia syndrome)        Past Surgical History:   Procedure Laterality Date    TONSILLECTOMY      TONSILLECTOMY         Family History   Problem Relation Age of Onset    Bipolar disorder Mother     Scoliosis Mother     Allergy (severe) Sister     Kidney disease Brother     Breast cancer Maternal Aunt     Diabetes Maternal Grandmother      I have reviewed and agree with the history as documented. E-Cigarette/Vaping    E-Cigarette Use Never User      E-Cigarette/Vaping Substances    Nicotine No     THC No     CBD No     Flavoring No     Other No     Unknown No      Social History     Tobacco Use    Smoking status: Former     Packs/day: 0.25     Types: Cigarettes    Smokeless tobacco: Never    Tobacco comments:     one cigarette a day   Vaping Use    Vaping Use: Never used   Substance Use Topics    Alcohol use: Not Currently    Drug use: Not Currently     Comment: previopus use of meth. Review of Systems   Constitutional: Negative. Negative for chills, fatigue and fever. HENT: Negative. Eyes: Negative. Respiratory: Negative. Negative for cough, shortness of breath and wheezing. Cardiovascular: Negative. Negative for chest pain. Gastrointestinal: Negative. Negative for abdominal pain, constipation, diarrhea, nausea and vomiting. Genitourinary: Negative. Negative for dysuria and frequency. Musculoskeletal: Negative. Negative for back pain and myalgias. Skin:  Positive for wound. Negative for rash. Neurological: Negative. Negative for dizziness, light-headedness and headaches. Psychiatric/Behavioral: Negative. All other systems reviewed and are negative. Physical Exam  Physical Exam  Vitals and nursing note reviewed. Constitutional:       General: She is awake. She is not in acute distress. Appearance: She is well-developed. She is not toxic-appearing. HENT:      Head: Normocephalic and atraumatic. Right Ear: Hearing and external ear normal.      Left Ear: Hearing and external ear normal.      Nose: Nose normal.      Mouth/Throat:      Mouth: Mucous membranes are moist.      Pharynx: Oropharynx is clear.    Eyes:      General: Lids are normal. No scleral icterus. Conjunctiva/sclera: Conjunctivae normal.   Neck:      Trachea: Trachea and phonation normal.   Cardiovascular:      Rate and Rhythm: Normal rate and regular rhythm. Pulses: Normal pulses. Heart sounds: Normal heart sounds. Pulmonary:      Effort: Pulmonary effort is normal. No tachypnea or respiratory distress. Abdominal:      General: Bowel sounds are normal. There is no distension. Palpations: Abdomen is soft. Tenderness: There is no abdominal tenderness. There is no guarding. Skin:     General: Skin is warm and dry. Capillary Refill: Capillary refill takes less than 2 seconds. Findings: Erythema present. No rash. Comments: Pt a pimple like pustule on the right inner thigh which is about 2-3 mm in size. There is some mild surrounding erythema but overall diameter about 1 cm. No active discharge or drainage. Mild tenderness, no crepitus. No red streaking. Neurological:      General: No focal deficit present. Mental Status: She is alert and oriented to person, place, and time. GCS: GCS eye subscore is 4. GCS verbal subscore is 5. GCS motor subscore is 6. Sensory: No sensory deficit. Gait: Gait normal.   Psychiatric:         Mood and Affect: Mood normal.         Speech: Speech normal.         Behavior: Behavior normal. Behavior is cooperative.          Vital Signs  ED Triage Vitals [11/01/23 2020]   Temperature Pulse Respirations Blood Pressure SpO2   98.9 °F (37.2 °C) 95 20 125/69 98 %      Temp Source Heart Rate Source Patient Position - Orthostatic VS BP Location FiO2 (%)   Tympanic Monitor Sitting Right arm --      Pain Score       4           Vitals:    11/01/23 2020 11/01/23 2104   BP: 125/69 125/69   Pulse: 95 95   Patient Position - Orthostatic VS: Sitting Sitting         Visual Acuity      ED Medications  Medications   lidocaine-epinephrine (XYLOCAINE/EPINEPHRINE) 1 %-1:100,000 injection 5 mL (5 mL Infiltration Given 11/1/23 2030)   sulfamethoxazole-trimethoprim (BACTRIM DS) 800-160 mg per tablet 1 tablet (1 tablet Oral Given 11/1/23 2102)       Diagnostic Studies  Results Reviewed       None                   No orders to display              Procedures  Incision and drain    Date/Time: 11/1/2023 8:45 PM    Performed by: Gayle Mina PA-C  Authorized by: Gayle Mina PA-C  Universal Protocol:  Procedure performed by:  Consent: Verbal consent obtained. Risks and benefits: risks, benefits and alternatives were discussed  Consent given by: patient  Patient understanding: patient states understanding of the procedure being performed  Site marked: the operative site was marked  Required items: required blood products, implants, devices, and special equipment available  Patient identity confirmed: verbally with patient and arm band    Patient location:  ED  Location:     Type:  Abscess    Size:  1  cm    Location:  Lower extremity    Lower extremity location:  R leg  Pre-procedure details:     Skin preparation:  Betadine  Anesthesia (see MAR for exact dosages): Anesthesia method:  Local infiltration    Local anesthetic:  Lidocaine 1% WITH epi  Procedure details:     Complexity:  Simple    Incision types:  Stab incision    Scalpel blade:  11    Approach:  Open    Incision depth:  Subcutaneous    Wound management:  Irrigated with saline    Drainage:  Purulent    Drainage amount: Moderate    Wound treatment:  Wound left open    Packing materials:  None  Post-procedure details:     Patient tolerance of procedure: Tolerated well, no immediate complications           ED Course       Discussed continued symptomatic/supportive care. Advised rest, fluids, OTC meds as needed for symptoms. Strict return precautions outlined. Advised outpatient follow up with PCP or return to ER for change in condition as outlined.  Pt verbalized understanding and had no further questions. Medical Decision Making  20 yo female presenting for subcutaneous abscess. This is fairly small/superficial.  Will perform I&D. Verbal consent obtained. Pt afebrile otherwise well appearing. I do not suspect a deep space infection. She does not appear systemically ill. Will place on antibiotics and discharge with symptomatic management and outpatient follow up. I&D performed. See procedure note above. Pt tolerated well. Please refer to above ER course for further details/discussion. Problems Addressed:  Abscess of right thigh: acute illness or injury    Amount and/or Complexity of Data Reviewed  External Data Reviewed: notes. Risk  OTC drugs. Prescription drug management. Disposition  Final diagnoses:   Abscess of right thigh     Time reflects when diagnosis was documented in both MDM as applicable and the Disposition within this note       Time User Action Codes Description Comment    11/1/2023  8:57 PM Katt Baron Add [L02.415] Abscess of right thigh           ED Disposition       ED Disposition   Discharge    Condition   Stable    Date/Time   Wed Nov 1, 2023  8:57 PM    Comment   [address] discharge to home/self care.                    Follow-up Information       Follow up With Specialties Details Why Contact Info Additional Information    [facility] Emergency Medicine  As needed [address] [place] [id number]  [facility] Emergency Department, [address] [place] [address], [id number]            Discharge Medication List as of 11/1/2023  8:59 PM        START taking these medications    Details   sulfamethoxazole-trimethoprim (BACTRIM DS) 800-160 mg per tablet Take 1 tablet by mouth 2 (two) times a day for 7 days, Starting Wed 11/1/2023, Until Wed 11/8/2023, Normal           CONTINUE these medications which have NOT CHANGED Details   albuterol (PROVENTIL HFA,VENTOLIN HFA) 90 mcg/act inhaler Inhale 2 puffs every 6 (six) hours as needed for wheezing  , Historical Med      famotidine (PEPCID) 20 mg tablet Take 1 PO HS., Normal      medroxyPROGESTERone acetate (DEPO-PROVERA SYRINGE) 150 mg/mL injection Inject 150 mg into a muscle every 3 (three) months, Historical Med      pantoprazole (PROTONIX) 40 mg tablet Take 1 PO QD in AM 30 mins prior to a robert., Normal             No discharge procedures on file.     PDMP Review       None            ED Provider  Electronically Signed by             Mechelle Alexandra PA-C  11/01/23 0082

## 2023-11-02 NOTE — DISCHARGE INSTRUCTIONS
Keep area clean and dry. Wash daily, warm water and soap. Watch for signs of worsening infection such as increased redness, warmth, increased pain, drainage, fever or any other concerns. Take antibiotic as directed for the full duration. Continue to alternate OTC tylenol and ibuprofen as needed for discomfort. Follow up with PCP or return to ER as needed.

## 2023-11-21 ENCOUNTER — ANESTHESIA EVENT (OUTPATIENT)
Dept: GASTROENTEROLOGY | Facility: HOSPITAL | Age: 18
End: 2023-11-21

## 2023-11-21 ENCOUNTER — HOSPITAL ENCOUNTER (OUTPATIENT)
Dept: GASTROENTEROLOGY | Facility: HOSPITAL | Age: 18
Setting detail: OUTPATIENT SURGERY
Discharge: HOME | End: 2023-11-21
Payer: COMMERCIAL

## 2023-11-21 ENCOUNTER — ANESTHESIA (OUTPATIENT)
Dept: GASTROENTEROLOGY | Facility: HOSPITAL | Age: 18
End: 2023-11-21

## 2023-11-21 VITALS
RESPIRATION RATE: 20 BRPM | TEMPERATURE: 97.2 F | OXYGEN SATURATION: 100 % | SYSTOLIC BLOOD PRESSURE: 119 MMHG | HEART RATE: 76 BPM | HEIGHT: 61 IN | WEIGHT: 134 LBS | BODY MASS INDEX: 25.3 KG/M2 | DIASTOLIC BLOOD PRESSURE: 70 MMHG

## 2023-11-21 DIAGNOSIS — R10.13 EPIGASTRIC PAIN: ICD-10-CM

## 2023-11-21 DIAGNOSIS — R13.10 DYSPHAGIA, UNSPECIFIED TYPE: ICD-10-CM

## 2023-11-21 DIAGNOSIS — K21.9 GASTROESOPHAGEAL REFLUX DISEASE, UNSPECIFIED WHETHER ESOPHAGITIS PRESENT: ICD-10-CM

## 2023-11-21 DIAGNOSIS — R11.2 NAUSEA AND VOMITING, UNSPECIFIED VOMITING TYPE: ICD-10-CM

## 2023-11-21 LAB
EXT PREGNANCY TEST URINE: NEGATIVE
EXT. CONTROL: NORMAL

## 2023-11-21 PROCEDURE — 43239 EGD BIOPSY SINGLE/MULTIPLE: CPT | Performed by: INTERNAL MEDICINE

## 2023-11-21 PROCEDURE — 88305 TISSUE EXAM BY PATHOLOGIST: CPT | Performed by: STUDENT IN AN ORGANIZED HEALTH CARE EDUCATION/TRAINING PROGRAM

## 2023-11-21 PROCEDURE — 81025 URINE PREGNANCY TEST: CPT | Performed by: INTERNAL MEDICINE

## 2023-11-21 PROCEDURE — 88342 IMHCHEM/IMCYTCHM 1ST ANTB: CPT | Performed by: STUDENT IN AN ORGANIZED HEALTH CARE EDUCATION/TRAINING PROGRAM

## 2023-11-21 RX ORDER — SODIUM CHLORIDE, SODIUM LACTATE, POTASSIUM CHLORIDE, CALCIUM CHLORIDE 600; 310; 30; 20 MG/100ML; MG/100ML; MG/100ML; MG/100ML
20 INJECTION, SOLUTION INTRAVENOUS CONTINUOUS
Status: CANCELLED | OUTPATIENT
Start: 2023-11-21

## 2023-11-21 RX ORDER — PROPOFOL 10 MG/ML
INJECTION, EMULSION INTRAVENOUS AS NEEDED
Status: DISCONTINUED | OUTPATIENT
Start: 2023-11-21 | End: 2023-11-21

## 2023-11-21 RX ORDER — GLYCOPYRROLATE 0.2 MG/ML
INJECTION INTRAMUSCULAR; INTRAVENOUS AS NEEDED
Status: DISCONTINUED | OUTPATIENT
Start: 2023-11-21 | End: 2023-11-21

## 2023-11-21 RX ORDER — SODIUM CHLORIDE, SODIUM LACTATE, POTASSIUM CHLORIDE, CALCIUM CHLORIDE 600; 310; 30; 20 MG/100ML; MG/100ML; MG/100ML; MG/100ML
INJECTION, SOLUTION INTRAVENOUS CONTINUOUS PRN
Status: DISCONTINUED | OUTPATIENT
Start: 2023-11-21 | End: 2023-11-21

## 2023-11-21 RX ADMIN — SODIUM CHLORIDE, SODIUM LACTATE, POTASSIUM CHLORIDE, AND CALCIUM CHLORIDE: .6; .31; .03; .02 INJECTION, SOLUTION INTRAVENOUS at 13:49

## 2023-11-21 RX ADMIN — PROPOFOL 50 MG: 10 INJECTION, EMULSION INTRAVENOUS at 14:02

## 2023-11-21 RX ADMIN — PROPOFOL 100 MG: 10 INJECTION, EMULSION INTRAVENOUS at 13:54

## 2023-11-21 RX ADMIN — PROPOFOL 100 MG: 10 INJECTION, EMULSION INTRAVENOUS at 13:59

## 2023-11-21 RX ADMIN — GLYCOPYRROLATE 0.2 MG: 0.2 INJECTION INTRAMUSCULAR; INTRAVENOUS at 13:54

## 2023-11-21 NOTE — ANESTHESIA PREPROCEDURE EVALUATION
Procedure:  EGD    Smoker  Denies illicit drug use  NPO appropriate  Relevant Problems   GI/HEPATIC   (+) Dysphagia   (+) Gastroesophageal reflux disease      MUSCULOSKELETAL   (+) Thoracogenic scoliosis of thoracic region      PULMONARY   (+) Asthma    Left Ventricle   Left ventricle is normal in size. Wall thickness is normal. Systolic function is normal with an ejection fraction of 60-65%. Wall motion is within normal limits. There is normal diastolic function. Right Ventricle   Right ventricle cavity is top normal. Systolic function is normal.     Left Atrium   Left atrium cavity size is normal.     Right Atrium   Right atrium cavity is normal.     IVC/SVC   The inferior vena cava demonstrates a diameter of <=21 mm and collapses >50%; therefore, the right atrial pressure is estimated at 0-5 mmHg. Mitral Valve   The leaflets are mildly thickened. There is trace regurgitation. There is no evidence of mitral valve stenosis. Tricuspid Valve   Tricuspid valve structure is normal. There is trace regurgitation. There is no evidence of tricuspid valve stenosis. Aortic Valve   The aortic valve is likely trileaflet. There is no regurgitation or stenosis. Pulmonic Valve   Pulmonic valve structure is normal. There is no regurgitation or stenosis. Physical Exam    Airway    Mallampati score: II  TM Distance: <3 FB  Neck ROM: full     Dental   No notable dental hx     Cardiovascular  Rhythm: regular    Pulmonary   Breath sounds clear to auscultation    Other Findings  post-pubertal.      Anesthesia Plan  ASA Score- 2     Anesthesia Type- IV sedation with anesthesia with ASA Monitors. Additional Monitors:     Airway Plan:     Comment: Risks of bronchospasm and laryngospasm discussed with patient given smoking history. Discussed that there is a possibility that we may need to abort the procedure or convert to GA if these complications arise. All questions were answered.   .       Plan Factors-    Chart reviewed. Patient summary reviewed. Patient is a current smoker. Induction- intravenous. Postoperative Plan-     Informed Consent- Anesthetic plan and risks discussed with patient and mother. I personally reviewed this patient with the CRNA. Discussed and agreed on the Anesthesia Plan with the CRNA. Horace Zamudio

## 2023-11-21 NOTE — ANESTHESIA POSTPROCEDURE EVALUATION
Post-Op Assessment Note    CV Status:  Stable  Pain Score: 0    Pain management: adequate       Mental Status:  Alert and awake   Hydration Status:  Euvolemic   PONV Controlled:  Controlled   Airway Patency:  Patent     Post Op Vitals Reviewed: Yes    No anethesia notable event occurred.     Staff: CRNA               BP   122/56   Temp 97   Pulse 110   Resp 16   SpO2 99

## 2023-11-21 NOTE — H&P
History and Physical -  [facility]  Lucero Rodriguez 19 y.o. female MRN: [id number]    HPI: Lucero Rodriguez is a 19y.o. year old female who presents for EGD for N/V, dysphagia, epigastric pain, reflux despite PPI and H2 blocker. REVIEW OF SYSTEMS: Per the HPI, and otherwise unremarkable. Historical Information   Past Medical History:   Diagnosis Date    Anxiety     Asthma     Back pain     Depression     GERD (gastroesophageal reflux disease)     POTS (postural orthostatic tachycardia syndrome)      Past Surgical History:   Procedure Laterality Date    TONSILLECTOMY      TONSILLECTOMY       Social History   Social History     Substance and Sexual Activity   Alcohol Use Not Currently     Social History     Substance and Sexual Activity   Drug Use Not Currently    Comment: previopus use of meth.       Social History     Tobacco Use   Smoking Status Former    Packs/day: 0.25    Types: Cigarettes   Smokeless Tobacco Never   Tobacco Comments    one cigarette a day     Family History   Problem Relation Age of Onset    Bipolar disorder Mother     Scoliosis Mother     Allergy (severe) Sister     Kidney disease Brother     Breast cancer Maternal Aunt     Diabetes Maternal Grandmother        Meds/Allergies       Current Outpatient Medications:     famotidine (PEPCID) 20 mg tablet    pantoprazole (PROTONIX) 40 mg tablet    albuterol (PROVENTIL HFA,VENTOLIN HFA) 90 mcg/act inhaler    medroxyPROGESTERone acetate (DEPO-PROVERA SYRINGE) 150 mg/mL injection    Allergies   Allergen Reactions    Latex Rash       Objective   /58   Pulse 71   Temp 98.7 °F (37.1 °C) (Temporal)   Resp 16   Ht 5' 1" (1.549 m)   Wt 60.8 kg (134 lb)   LMP 11/13/2023 Comment: on depo urine hcg is negative  SpO2 100%   BMI 25.32 kg/m²     PHYSICAL EXAM  Gen: NAD  Head: NCAT  CV: RRR  CHEST: CTAB  ABD: soft, NT/ND  EXT: no edema    ASSESSMENT/PLAN:  This is a 19y.o. year old female here for EGD, and she is stable and optimized for her procedure.

## 2023-11-22 ENCOUNTER — APPOINTMENT (EMERGENCY)
Dept: RADIOLOGY | Facility: HOSPITAL | Age: 18
End: 2023-11-22
Payer: COMMERCIAL

## 2023-11-22 ENCOUNTER — HOSPITAL ENCOUNTER (EMERGENCY)
Facility: HOSPITAL | Age: 18
Discharge: HOME | End: 2023-11-22
Attending: EMERGENCY MEDICINE
Payer: COMMERCIAL

## 2023-11-22 VITALS
TEMPERATURE: 97.4 F | DIASTOLIC BLOOD PRESSURE: 64 MMHG | HEART RATE: 59 BPM | SYSTOLIC BLOOD PRESSURE: 109 MMHG | OXYGEN SATURATION: 97 % | RESPIRATION RATE: 20 BRPM

## 2023-11-22 DIAGNOSIS — Z98.890 HISTORY OF ESOPHAGOGASTRODUODENOSCOPY (EGD): ICD-10-CM

## 2023-11-22 DIAGNOSIS — R10.13 EPIGASTRIC PAIN: Primary | ICD-10-CM

## 2023-11-22 DIAGNOSIS — R06.00 DYSPNEA: ICD-10-CM

## 2023-11-22 LAB
ALBUMIN SERPL BCP-MCNC: 3.9 G/DL (ref 3.5–5)
ALP SERPL-CCNC: 42 U/L (ref 34–104)
ALT SERPL W P-5'-P-CCNC: 9 U/L (ref 7–52)
ANION GAP SERPL CALCULATED.3IONS-SCNC: 7 MMOL/L
AST SERPL W P-5'-P-CCNC: 13 U/L (ref 13–39)
BASOPHILS # BLD AUTO: 0.03 THOUSANDS/ÂΜL (ref 0–0.1)
BASOPHILS NFR BLD AUTO: 0 % (ref 0–1)
BILIRUB SERPL-MCNC: 0.35 MG/DL (ref 0.2–1)
BUN SERPL-MCNC: 14 MG/DL (ref 5–25)
CALCIUM SERPL-MCNC: 8.7 MG/DL (ref 8.4–10.2)
CARDIAC TROPONIN I PNL SERPL HS: <2 NG/L
CHLORIDE SERPL-SCNC: 108 MMOL/L (ref 96–108)
CO2 SERPL-SCNC: 24 MMOL/L (ref 21–32)
CREAT SERPL-MCNC: 0.76 MG/DL (ref 0.6–1.3)
EOSINOPHIL # BLD AUTO: 0.47 THOUSAND/ÂΜL (ref 0–0.61)
EOSINOPHIL NFR BLD AUTO: 6 % (ref 0–6)
ERYTHROCYTE [DISTWIDTH] IN BLOOD BY AUTOMATED COUNT: 11.8 % (ref 11.6–15.1)
GFR SERPL CREATININE-BSD FRML MDRD: 113 ML/MIN/1.73SQ M
GLUCOSE SERPL-MCNC: 94 MG/DL (ref 65–140)
HCT VFR BLD AUTO: 39 % (ref 34.8–46.1)
HGB BLD-MCNC: 13.2 G/DL (ref 11.5–15.4)
IMM GRANULOCYTES # BLD AUTO: 0.01 THOUSAND/UL (ref 0–0.2)
IMM GRANULOCYTES NFR BLD AUTO: 0 % (ref 0–2)
LIPASE SERPL-CCNC: 47 U/L (ref 11–82)
LYMPHOCYTES # BLD AUTO: 3.59 THOUSANDS/ÂΜL (ref 0.6–4.47)
LYMPHOCYTES NFR BLD AUTO: 46 % (ref 14–44)
MCH RBC QN AUTO: 30.9 PG (ref 26.8–34.3)
MCHC RBC AUTO-ENTMCNC: 33.8 G/DL (ref 31.4–37.4)
MCV RBC AUTO: 91 FL (ref 82–98)
MONOCYTES # BLD AUTO: 0.5 THOUSAND/ÂΜL (ref 0.17–1.22)
MONOCYTES NFR BLD AUTO: 6 % (ref 4–12)
NEUTROPHILS # BLD AUTO: 3.31 THOUSANDS/ÂΜL (ref 1.85–7.62)
NEUTS SEG NFR BLD AUTO: 42 % (ref 43–75)
NRBC BLD AUTO-RTO: 0 /100 WBCS
PLATELET # BLD AUTO: 160 THOUSANDS/UL (ref 149–390)
PMV BLD AUTO: 9.5 FL (ref 8.9–12.7)
POTASSIUM SERPL-SCNC: 4 MMOL/L (ref 3.5–5.3)
PROT SERPL-MCNC: 6.3 G/DL (ref 6.4–8.4)
RBC # BLD AUTO: 4.27 MILLION/UL (ref 3.81–5.12)
SODIUM SERPL-SCNC: 139 MMOL/L (ref 135–147)
WBC # BLD AUTO: 7.91 THOUSAND/UL (ref 4.31–10.16)

## 2023-11-22 PROCEDURE — 99285 EMERGENCY DEPT VISIT HI MDM: CPT | Performed by: EMERGENCY MEDICINE

## 2023-11-22 PROCEDURE — 96361 HYDRATE IV INFUSION ADD-ON: CPT

## 2023-11-22 PROCEDURE — 84484 ASSAY OF TROPONIN QUANT: CPT | Performed by: EMERGENCY MEDICINE

## 2023-11-22 PROCEDURE — 36415 COLL VENOUS BLD VENIPUNCTURE: CPT | Performed by: EMERGENCY MEDICINE

## 2023-11-22 PROCEDURE — 80053 COMPREHEN METABOLIC PANEL: CPT | Performed by: EMERGENCY MEDICINE

## 2023-11-22 PROCEDURE — 71046 X-RAY EXAM CHEST 2 VIEWS: CPT

## 2023-11-22 PROCEDURE — 96374 THER/PROPH/DIAG INJ IV PUSH: CPT

## 2023-11-22 PROCEDURE — 85025 COMPLETE CBC W/AUTO DIFF WBC: CPT | Performed by: EMERGENCY MEDICINE

## 2023-11-22 PROCEDURE — 99285 EMERGENCY DEPT VISIT HI MDM: CPT

## 2023-11-22 PROCEDURE — 83690 ASSAY OF LIPASE: CPT | Performed by: EMERGENCY MEDICINE

## 2023-11-22 RX ORDER — LIDOCAINE HYDROCHLORIDE 20 MG/ML
15 SOLUTION OROPHARYNGEAL ONCE
Status: COMPLETED | OUTPATIENT
Start: 2023-11-22 | End: 2023-11-22

## 2023-11-22 RX ORDER — KETOROLAC TROMETHAMINE 30 MG/ML
15 INJECTION, SOLUTION INTRAMUSCULAR; INTRAVENOUS ONCE
Status: COMPLETED | OUTPATIENT
Start: 2023-11-22 | End: 2023-11-22

## 2023-11-22 RX ADMIN — KETOROLAC TROMETHAMINE 15 MG: 30 INJECTION, SOLUTION INTRAMUSCULAR at 01:44

## 2023-11-22 RX ADMIN — SODIUM CHLORIDE 1000 ML: 0.9 INJECTION, SOLUTION INTRAVENOUS at 01:42

## 2023-11-22 RX ADMIN — LIDOCAINE HYDROCHLORIDE 15 ML: 20 SOLUTION ORAL; TOPICAL at 01:46

## 2023-11-22 NOTE — ED PROVIDER NOTES
History  Chief Complaint   Patient presents with    Weakness - Generalized     Per pt, had EGD done 12 hours ago, since then has had chest pain/SOB/weakness/leg cramps worsening. 66-year-old female presents for evaluation of epigastric pain. Patient underwent an EGD yesterday for dysphagia. Patient has never had an EGD before, she did have some soreness and pain after the procedure however this is persisted and worsened throughout the night time. Patient did eat a few hours after the procedure and had a vomiting episode, she then was able to eat a piece of toast and tolerate water. Patient points to her epigastric region is where the pain is states is sharp and radiates to her back. Patient also notes a pain with inspiration. Patient is also complaining of bilateral lower extremity cramping, she does note this is somewhat typical for her pots syndrome. She believes the procedure may have triggered a flare, patient did take a medication she was prescribed for this at home however has not worked. Patient notes this medication sometimes works and sometimes does not. Prior to Admission Medications   Prescriptions Last Dose Informant Patient Reported? Taking? albuterol (PROVENTIL HFA,VENTOLIN HFA) 90 mcg/act inhaler   Yes No   Sig: Inhale 2 puffs every 6 (six) hours as needed for wheezing     famotidine (PEPCID) 20 mg tablet   No No   Sig: Take 1 PO HS.   medroxyPROGESTERone acetate (DEPO-PROVERA SYRINGE) 150 mg/mL injection   Yes No   Sig: Inject 150 mg into a muscle every 3 (three) months   pantoprazole (PROTONIX) 40 mg tablet   No No   Sig: Take 1 PO QD in AM 30 mins prior to a robert.       Facility-Administered Medications: None       Past Medical History:   Diagnosis Date    Anxiety     Asthma     Back pain     Depression     GERD (gastroesophageal reflux disease)     POTS (postural orthostatic tachycardia syndrome)        Past Surgical History:   Procedure Laterality Date    TONSILLECTOMY TONSILLECTOMY         Family History   Problem Relation Age of Onset    Bipolar disorder Mother     Scoliosis Mother     Allergy (severe) Sister     Kidney disease Brother     Breast cancer Maternal Aunt     Diabetes Maternal Grandmother      I have reviewed and agree with the history as documented. E-Cigarette/Vaping    E-Cigarette Use Never User      E-Cigarette/Vaping Substances    Nicotine No     THC No     CBD No     Flavoring No     Other No     Unknown No      Social History     Tobacco Use    Smoking status: Every Day     Packs/day: 0.25     Types: Cigarettes    Smokeless tobacco: Never    Tobacco comments:     one cigarette a day   Vaping Use    Vaping Use: Never used   Substance Use Topics    Alcohol use: Not Currently    Drug use: Not Currently     Comment: previopus use of meth. Review of Systems   Constitutional:  Negative for appetite change, chills and fever. Respiratory:  Positive for chest tightness and shortness of breath. Gastrointestinal:  Positive for abdominal pain, nausea and vomiting. Musculoskeletal:  Positive for myalgias. All other systems reviewed and are negative. Physical Exam  Physical Exam  Vitals reviewed. Constitutional:       General: She is not in acute distress. Appearance: Normal appearance. She is not ill-appearing, toxic-appearing or diaphoretic. Comments: Sipping from water bottle   HENT:      Head: Normocephalic and atraumatic. Right Ear: External ear normal.      Left Ear: External ear normal.      Nose: Nose normal.      Mouth/Throat:      Comments: Phonating normally  Eyes:      General: No scleral icterus. Right eye: No discharge. Left eye: No discharge. Cardiovascular:      Rate and Rhythm: Normal rate and regular rhythm. Pulmonary:      Effort: Pulmonary effort is normal. No respiratory distress. Breath sounds: Normal breath sounds. No stridor. No wheezing, rhonchi or rales.       Comments: No crepitus  Abdominal:      General: There is no distension. Palpations: Abdomen is soft. Tenderness: There is abdominal tenderness (epigastric). There is no guarding or rebound. Musculoskeletal:         General: No deformity or signs of injury. Skin:     General: Skin is warm. Coloration: Skin is not jaundiced or pale. Neurological:      General: No focal deficit present. Mental Status: She is alert.       Gait: Gait normal.         Vital Signs  ED Triage Vitals [11/22/23 0113]   Temperature Pulse Respirations Blood Pressure SpO2   (!) 97.4 °F (36.3 °C) 86 20 129/59 97 %      Temp Source Heart Rate Source Patient Position - Orthostatic VS BP Location FiO2 (%)   Tympanic Monitor -- Left arm --      Pain Score       5           Vitals:    11/22/23 0113 11/22/23 0201 11/22/23 0230   BP: 129/59 108/55 109/64   Pulse: 86 65 59         Visual Acuity      ED Medications  Medications   sodium chloride 0.9 % bolus 1,000 mL (0 mL Intravenous Stopped 11/22/23 0240)   ketorolac (TORADOL) injection 15 mg (15 mg Intravenous Given 11/22/23 0144)   Lidocaine Viscous HCl (XYLOCAINE) 2 % mucosal solution 15 mL (15 mL Swish & Spit Given 11/22/23 0146)       Diagnostic Studies  Results Reviewed       Procedure Component Value Units Date/Time    HS Troponin 0hr (reflex protocol) [[id number]]  (Normal) Collected: 11/22/23 0139    Lab Status: Final result Specimen: Blood from Arm, Left Updated: 11/22/23 0210     hs TnI 0hr <2 ng/L     Comprehensive metabolic panel [[id number]]  (Abnormal) Collected: 11/22/23 0139    Lab Status: Final result Specimen: Blood from Arm, Left Updated: 11/22/23 0204     Sodium 139 mmol/L      Potassium 4.0 mmol/L      Chloride 108 mmol/L      CO2 24 mmol/L      ANION GAP 7 mmol/L      BUN 14 mg/dL      Creatinine 0.76 mg/dL      Glucose 94 mg/dL      Calcium 8.7 mg/dL      AST 13 U/L      ALT 9 U/L      Alkaline Phosphatase 42 U/L      Total Protein 6.3 g/dL      Albumin 3.9 g/dL Total Bilirubin 0.35 mg/dL      eGFR 113 ml/min/1.73sq m     Narrative:      [place] guidelines for Chronic Kidney Disease (CKD):     Stage 1 with normal or high GFR (GFR > 90 mL/min/1.73 square meters)    Stage 2 Mild CKD (GFR = 60-89 mL/min/1.73 square meters)    Stage 3A Moderate CKD (GFR = 45-59 mL/min/1.73 square meters)    Stage 3B Moderate CKD (GFR = 30-44 mL/min/1.73 square meters)    Stage 4 Severe CKD (GFR = 15-29 mL/min/1.73 square meters)    Stage 5 End Stage CKD (GFR <15 mL/min/1.73 square meters)  Note: GFR calculation is accurate only with a steady state creatinine    Lipase [[id number]]  (Normal) Collected: 11/22/23 0139    Lab Status: Final result Specimen: Blood from Arm, Left Updated: 11/22/23 0204     Lipase 47 u/L     CBC and differential [[id number]]  (Abnormal) Collected: 11/22/23 0139    Lab Status: Final result Specimen: Blood from Arm, Left Updated: 11/22/23 0144     WBC 7.91 Thousand/uL      RBC 4.27 Million/uL      Hemoglobin 13.2 g/dL      Hematocrit 39.0 %      MCV 91 fL      MCH 30.9 pg      MCHC 33.8 g/dL      RDW 11.8 %      MPV 9.5 fL      Platelets 658 Thousands/uL      nRBC 0 /100 WBCs      Neutrophils Relative 42 %      Immat GRANS % 0 %      Lymphocytes Relative 46 %      Monocytes Relative 6 %      Eosinophils Relative 6 %      Basophils Relative 0 %      Neutrophils Absolute 3.31 Thousands/µL      Immature Grans Absolute 0.01 Thousand/uL      Lymphocytes Absolute 3.59 Thousands/µL      Monocytes Absolute 0.50 Thousand/µL      Eosinophils Absolute 0.47 Thousand/µL      Basophils Absolute 0.03 Thousands/µL                    XR chest 2 views   Final Result by Glendy Erickson MD (11/22 0700)      No acute cardiopulmonary disease. No evidence of pneumomediastinum.                   Workstation performed: [id number]                    Procedures  Procedures         ED Course  ED Course as of 11/22/23 1435   Wed Nov 22, 2023   0114 Blood Pressure: 129/59 0114 Temperature(!): 97.4 °F (36.3 °C)   0114 Temp Source: Tympanic   0114 Pulse: 86   0114 Respirations: 20   0114 SpO2: 97 %   0151 CBC and differential(!)  Within normal   0204 Lipase: 47  negative   0204 Comprehensive metabolic panel(!)  normal   0206 EKG reviewed in MUSE  NSR 60bpm, normal axis, normal intervals, normal QRS, no acute STEPHIE/D or significant changes from previous   0210 hs TnI 0hr: <2  negative   0210 Pt updated to results, all normal thus far. Lipase well within normal range, doubt pancreatitis as source of pain. Patient additionally notes some relief with lidocaine, persistently appears well. Will screen for complication of EGD with 2 view CXR and likely plan to d/c home, symptoms likely related to soreness from procedure. 0229 XR chest 2 views  On my interpretation, no evidence of: ptx, pneumomediastinum, subq air, free air under diaphragm; normal cxr             HEART Risk Score      Flowsheet Row Most Recent Value   Heart Score Risk Calculator    History 0 Filed at: 11/22/2023 0130   ECG 0 Filed at: 11/22/2023 0130   Age 0 Filed at: 11/22/2023 0130   Risk Factors 0 Filed at: 11/22/2023 0130   Troponin 0 Filed at: 11/22/2023 0130   HEART Score 0 Filed at: 11/22/2023 0130                                        Medical Decision Making  78-year-old female presents for evaluation of epigastric pain. Patient underwent EGD procedure yesterday. Patient did have 1 nausea vomiting episode after eating however since has been able to tolerate p.o. Patient is speaking clearly and in no respiratory distress, she is tolerating sips from a water bottle at bedside. Will evaluate for consequence of EGD such as pneumomediastinum, perforated viscus, pancreatitis however I doubt these etiologies at this time. We will additionally assess for ACS, GI disturbance as a source of patient's bilateral lower extremity cramping however this may be somewhat normal for patient with POTS flare.  Can try viscous lidocaine and toradol for pain control. Amount and/or Complexity of Data Reviewed  Labs: ordered. Decision-making details documented in ED Course. Radiology: ordered. Decision-making details documented in ED Course. Risk  Prescription drug management. Disposition  Final diagnoses:   History of esophagogastroduodenoscopy (EGD)   Epigastric pain   Dyspnea     Time reflects when diagnosis was documented in both MDM as applicable and the Disposition within this note       Time User Action Codes Description Comment    11/22/2023  2:33 AM Sadorus Se Add [Z98.890] History of esophagogastroduodenoscopy (EGD)     11/22/2023  2:33 AM Sadorus Se Add [R10.13] Epigastric pain     11/22/2023  2:33 AM Sadorus Se Modify [Z98.890] History of esophagogastroduodenoscopy (EGD)     11/22/2023  2:33 AM Sadorus Se Modify [R10.13] Epigastric pain     11/22/2023  2:33 AM Sadorus Se Add [R06.00] Dyspnea           ED Disposition       ED Disposition   Discharge    Condition   Stable    Date/Time   Wed Nov 22, 2023 [address] discharge to home/self care.                    Follow-up Information       [facility] With [facility] Details [facility]  If symptoms worsen [address] [place] [id number]  [address] [place] [address] Emergency Department, [address] [place] [address], [facility]            Discharge Medication List as of 11/22/2023  2:34 AM        CONTINUE these medications which have NOT CHANGED    Details   albuterol (PROVENTIL HFA,VENTOLIN HFA) 90 mcg/act inhaler Inhale 2 puffs every 6 (six) hours as needed for wheezing  , Historical Med      famotidine (PEPCID) 20 mg tablet Take 1 PO HS., Normal      medroxyPROGESTERone acetate (DEPO-PROVERA SYRINGE) 150 mg/mL injection Inject 150 mg into a muscle every 3 (three) months, Historical Med      pantoprazole (PROTONIX) 40 mg tablet Take 1 PO QD in AM 30 mins prior to a robert., Normal             No discharge procedures on file.     PDMP Review       None            ED Provider  Electronically Signed by             Jerry Duque DO  11/22/23 4916

## 2023-11-28 PROCEDURE — 88342 IMHCHEM/IMCYTCHM 1ST ANTB: CPT | Performed by: STUDENT IN AN ORGANIZED HEALTH CARE EDUCATION/TRAINING PROGRAM

## 2023-11-28 PROCEDURE — 88305 TISSUE EXAM BY PATHOLOGIST: CPT | Performed by: STUDENT IN AN ORGANIZED HEALTH CARE EDUCATION/TRAINING PROGRAM

## 2023-12-06 DIAGNOSIS — K21.9 GASTROESOPHAGEAL REFLUX DISEASE, UNSPECIFIED WHETHER ESOPHAGITIS PRESENT: ICD-10-CM

## 2023-12-06 DIAGNOSIS — R13.10 DYSPHAGIA, UNSPECIFIED TYPE: ICD-10-CM

## 2023-12-06 DIAGNOSIS — R11.2 NAUSEA AND VOMITING, UNSPECIFIED VOMITING TYPE: ICD-10-CM

## 2023-12-06 DIAGNOSIS — R10.13 EPIGASTRIC PAIN: ICD-10-CM

## 2023-12-06 RX ORDER — FAMOTIDINE 20 MG/1
TABLET, FILM COATED ORAL
Qty: 90 TABLET | Refills: 1 | Status: SHIPPED | OUTPATIENT
Start: 2023-12-06

## 2024-01-04 ENCOUNTER — OFFICE VISIT (OUTPATIENT)
Age: 19
End: 2024-01-04
Payer: COMMERCIAL

## 2024-01-04 ENCOUNTER — TELEPHONE (OUTPATIENT)
Age: 19
End: 2024-01-04

## 2024-01-04 DIAGNOSIS — K21.9 GASTROESOPHAGEAL REFLUX DISEASE, UNSPECIFIED WHETHER ESOPHAGITIS PRESENT: ICD-10-CM

## 2024-01-04 DIAGNOSIS — R10.13 EPIGASTRIC PAIN: ICD-10-CM

## 2024-01-04 DIAGNOSIS — R11.2 NAUSEA AND VOMITING, UNSPECIFIED VOMITING TYPE: ICD-10-CM

## 2024-01-04 DIAGNOSIS — K59.00 CONSTIPATION, UNSPECIFIED CONSTIPATION TYPE: ICD-10-CM

## 2024-01-04 DIAGNOSIS — R13.10 DYSPHAGIA, UNSPECIFIED TYPE: Primary | ICD-10-CM

## 2024-01-04 PROCEDURE — 99214 OFFICE O/P EST MOD 30 MIN: CPT | Performed by: NURSE PRACTITIONER

## 2024-01-04 RX ORDER — FAMOTIDINE 20 MG/1
TABLET, FILM COATED ORAL
Qty: 90 TABLET | Refills: 2 | Status: SHIPPED | OUTPATIENT
Start: 2024-01-04

## 2024-01-04 RX ORDER — POLYETHYLENE GLYCOL 3350 17 G/17G
17 POWDER, FOR SOLUTION ORAL 2 TIMES DAILY
Qty: 578 G | Refills: 10 | Status: SHIPPED | OUTPATIENT
Start: 2024-01-04

## 2024-01-04 NOTE — PROGRESS NOTES
Virtual Regular Visit    Verification of patient location:     Patient is located at Home in the following state in which I hold an active license [place]      Assessment/Plan:    [facility] - Outpatient Follow-up Note  Maranda Rodríguez 18 y.o. female MRN: [id number]  Encounter: [id number]          ASSESSMENT AND PLAN:    The patient presents today for follow-up virtual visit regarding her chronic dysphagia, epigastric pain, nausea, vomiting, GERD, and constipation symptoms.    1. Dysphagia, unspecified type  2. Epigastric pain  3. Nausea and vomiting, unspecified vomiting type  4. Gastroesophageal reflux disease, unspecified whether esophagitis present  Improving    During our virtual visit today, I discussed with the patient that at this point time, as per her wishes, we will hold off on retrying the Protonix either at a higher dose or trying a different PPI for now.  Fiber, in the meantime she can continue to use the famotidine twice daily as needed for any reflux symptoms and if her reflux symptoms, nausea, or vomiting should stop improving or worsen, she should contact our office and we can discuss retrying the Protonix at the twice daily dosing or different PPI such as omeprazole.  The patient was agreeable and verbalized an understanding.    Encouraged the patient to continue to avoid trigger foods as much as possible, including alcohol.    With regards to the dysphagia symptoms, I encouraged the patient to eat 4-5 small meals daily, take small bites, cut their food into small pieces, chew thoroughly, and take their time while eating to prevent any choking or aspiration. The patient verbalized an understanding.      - famotidine (PEPCID) 20 mg tablet; TAKE 1 PO BID PRN for any GERD symptoms.  Dispense: 90 tablet; Refill: 2    5. Constipation, unspecified constipation type  Improving    I discussed with the patient that although I definitely feel it is in her best nurses  to continue to drink as close to 64 ounces of water daily as well as increase her daily fiber intake, I also feel would be beneficial since she is still only having a bowel movement every 3 to 4 days and it works for her in the past, to restart the MiraLAX daily for 1 week and then increase it to twice daily dosing for the next 4 weeks and see how she does.  I advised the patient that if she finds she is having loose stools or diarrhea she can always decrease it back down to the once daily dosing or every other day, in general I feel it is in her best interest to find a reasonable bowel regimen to keep her bowels moving at least every other day as I feel this will make her feel much better and also help with the nausea and vomiting.  The patient was agreeable and verbalized an understanding.    Encouraged the patient to contact our office in 4 to 5 weeks to give us an update on how she is doing and to contact us sooner if there is any issues.  The patient was agreeable and verbalized an understanding.    - polyethylene glycol (GLYCOLAX) 17 GM/SCOOP powder; Take 17 g by mouth 2 (two) times a day  Dispense: 578 g; Refill: 10     The patient will schedule a follow up office visit in 6 months, but understands to call or contact our office if there are any issues or concerns in the mean time.  ______________________________________________________________________    SUBJECTIVE: Patient is a 18 y.o. female who was previously seen in our office on 10/25/23 and presents today for follow-up virtual visit regarding her chronic dysphagia, epigastric pain, nausea, vomiting, GERD, and constipation symptoms.  The patient did proceed with the previously discussed EGD on November 21, 2023 with Dr. Bellamy which did show a small 3 cm hiatal hernia, but was otherwise normal with the pathology being normal.    The patient reports that overall she does feel there has been at least mild to moderate improvement of her symptoms as she did  start to the Protonix 40 mg daily and famotidine 20 mg at bedtime and did take it for at least 4 to 6 weeks and that approximately 2 weeks ago stopped taking it as she was not sure how helpful it was overall and prefers not to take prescribed medication and try to do things like change her diet or more natural herbs and supplements then take medications if she can avoid it.  She reports that she has been working on drinking more water and fruit juices as well as an increase her dietary fiber.    She reports that the dysphagia is much better as well as the intermittent nausea and vomiting and finds that if she takes her time with eating and following the dysphagia recommendations as we discussed, her symptoms are definitely improved.    She reports that her constipation is also improving and even though she is still only having a bowel movement once or twice a week, it is still more often than it was and is definitely not having as much straining or constipation as she was experiencing in the past.  She does report that she had been using MiraLAX in the past as a child and him had to take it 2 or 3 times a day along with other medications just in order to have a bowel movement somewhat regularly.    The patient denies any reflux, dysphagia, decreased appetite, unplanned weight loss, or abdominal pain.    The patient reports that they have a BM every 3 to 4 days and reports that it is relieving, without any bloating, consistent diarrhea, nocturnal BMs, constipation, straining, melena or bloody stools. Last BM: 3-4 days ago. Flatus: Yes, normal.    Meds: None  NSAID Use: Denied    Imaging: (None):     Endoscopy History: EGD: (11/21/23): 3 cm hiatal hernia was noted, otherwise normal. Path: Normal.     COLONOSCOPY: (None):       Recent Visits  No visits were found meeting these conditions.  Showing recent visits within past 7 days and meeting all other requirements  Today's Visits  Date Type Provider Dept   01/04/24  Telephone [facility]   01/04/24 Office Visit [facility]   Showing today's visits and meeting all other requirements  Future Appointments  No visits were found meeting these conditions.  Showing future appointments within next 150 days and meeting all other requirements       The patient was identified by name and date of birth. Maranda Rodríguez was informed that this is a telemedicine visit and that the visit is being conducted through the [employer] platform. She agrees to proceed..  My office door was closed. No one else was in the room.  She acknowledged consent and understanding of privacy and security of the video platform. The patient has agreed to participate and understands they can discontinue the visit at any time.    Patient is aware this is a billable service.     HPI     Past Medical History:   Diagnosis Date    Anxiety     Asthma     Back pain     Depression     GERD (gastroesophageal reflux disease)     POTS (postural orthostatic tachycardia syndrome)        Past Surgical History:   Procedure Laterality Date    TONSILLECTOMY      TONSILLECTOMY         Current Outpatient Medications   Medication Sig Dispense Refill    albuterol (PROVENTIL HFA,VENTOLIN HFA) 90 mcg/act inhaler Inhale 2 puffs every 6 (six) hours as needed for wheezing        famotidine (PEPCID) 20 mg tablet TAKE 1 TABLET BY MOUTH EVERYDAY AT BEDTIME 90 tablet 1    medroxyPROGESTERone acetate (DEPO-PROVERA SYRINGE) 150 mg/mL injection Inject 150 mg into a muscle every 3 (three) months      pantoprazole (PROTONIX) 40 mg tablet Take 1 PO QD in AM 30 mins prior to a robert. 30 tablet 2     No current facility-administered medications for this visit.        Allergies   Allergen Reactions    Latex Rash       Review of Systems    Video Exam    There were no vitals filed for this visit.    Physical Exam     Visit Time  Total Visit Duration: 18 minutes.

## 2024-01-04 NOTE — TELEPHONE ENCOUNTER
LVM for patient. Provider out sick. He is willing to do a virtual visit with the patient or can r/s to another date. If patient is doing virtual please verify if they would like a phone call, to use my chart or a link sent to them via text.

## 2024-01-04 NOTE — TELEPHONE ENCOUNTER
Pt called back and stated that a virtual would be fine and that she prefers a link sent to her phone via text.

## 2024-01-19 ENCOUNTER — APPOINTMENT (OUTPATIENT)
Dept: RADIOLOGY | Facility: CLINIC | Age: 19
End: 2024-01-19
Payer: COMMERCIAL

## 2024-01-19 ENCOUNTER — OFFICE VISIT (OUTPATIENT)
Dept: URGENT CARE | Facility: CLINIC | Age: 19
End: 2024-01-19
Payer: COMMERCIAL

## 2024-01-19 VITALS — RESPIRATION RATE: 18 BRPM | HEART RATE: 64 BPM | TEMPERATURE: 98.3 F | OXYGEN SATURATION: 98 %

## 2024-01-19 DIAGNOSIS — M79.644 PAIN OF FINGER OF RIGHT HAND: ICD-10-CM

## 2024-01-19 DIAGNOSIS — S62.639B OPEN FRACTURE OF TUFT OF DISTAL PHALANX OF FINGER: Primary | ICD-10-CM

## 2024-01-19 PROCEDURE — 90715 TDAP VACCINE 7 YRS/> IM: CPT

## 2024-01-19 PROCEDURE — 73130 X-RAY EXAM OF HAND: CPT

## 2024-01-19 PROCEDURE — 99213 OFFICE O/P EST LOW 20 MIN: CPT | Performed by: PHYSICIAN ASSISTANT

## 2024-01-19 RX ORDER — CEPHALEXIN 500 MG/1
500 CAPSULE ORAL EVERY 8 HOURS SCHEDULED
Qty: 21 CAPSULE | Refills: 0 | Status: SHIPPED | OUTPATIENT
Start: 2024-01-19 | End: 2024-01-26

## 2024-01-19 NOTE — PROGRESS NOTES
[facility]    NAME: Maranda Rodríguez is a 18 y.o. female  : 2005    MRN: [id number]  DATE: 2024  TIME: 5:08 PM    Assessment and Plan   Open fracture of tuft of distal phalanx of finger [S62.639B]  1. Open fracture of tuft of distal phalanx of finger  XR hand 3+ vw right    Tdap Vaccine greater than or equal to 8yo    cephalexin (KEFLEX) 500 mg capsule    Ambulatory Referral to Orthopedic Surgery    CANCELED: XR finger right fifth digit-pinkie          Patient Instructions     Patient Instructions   Antibiotic as directed.  Finger splint.  Follow up with ortho.    Chief Complaint     Chief Complaint   Patient presents with    Finger Injury     Slammed finger in car door. Happened on Tuesday.        History of Present Illness   18 year old female here with complaint of right little finger injury.  Was stuck in car door 3 days ago.  Open wound around nail.  Pain in entire little finger and in ulnar aspect of hand. Unknown when her last tetanus was.        Review of Systems   Review of Systems   Constitutional:  Negative for chills and fever.   Respiratory:  Negative for cough and shortness of breath.    Musculoskeletal:         Right little finger injury   Skin:  Positive for wound.   Neurological:  Positive for numbness.       Current Medications     Current Outpatient Medications:     cephalexin (KEFLEX) 500 mg capsule, Take 1 capsule (500 mg total) by mouth every 8 (eight) hours for 7 days, Disp: 21 capsule, Rfl: 0    albuterol (PROVENTIL HFA,VENTOLIN HFA) 90 mcg/act inhaler, Inhale 2 puffs every 6 (six) hours as needed for wheezing  , Disp: , Rfl:     famotidine (PEPCID) 20 mg tablet, TAKE 1 PO BID PRN for any GERD symptoms., Disp: 90 tablet, Rfl: 2    medroxyPROGESTERone acetate (DEPO-PROVERA SYRINGE) 150 mg/mL injection, Inject 150 mg into a muscle every 3 (three) months, Disp: , Rfl:     polyethylene glycol (GLYCOLAX) 17 GM/SCOOP powder, Take 17 g by mouth 2 (two) times a day, Disp:  578 g, Rfl: 10    Current Allergies     Allergies as of 01/19/2024 - Reviewed 01/19/2024   Allergen Reaction Noted    Latex Rash 04/12/2019          The following portions of the patient's history were reviewed and updated as appropriate: allergies, current medications, past family history, past medical history, past social history, past surgical history and problem list.   Past Medical History:   Diagnosis Date    Anxiety     Asthma     Back pain     Depression     GERD (gastroesophageal reflux disease)     POTS (postural orthostatic tachycardia syndrome)      Past Surgical History:   Procedure Laterality Date    TONSILLECTOMY      TONSILLECTOMY       Family History   Problem Relation Age of Onset    Bipolar disorder Mother     Scoliosis Mother     Allergy (severe) Sister     Kidney disease Brother     Breast cancer Maternal Aunt     Diabetes Maternal Grandmother      Social History     Socioeconomic History    Marital status: Single     Spouse name: Not on file    Number of children: Not on file    Years of education: Not on file    Highest education level: Not on file   Occupational History    Not on file   Tobacco Use    Smoking status: Every Day     Current packs/day: 0.25     Types: Cigarettes    Smokeless tobacco: Never    Tobacco comments:     one cigarette a day   Vaping Use    Vaping status: Never Used   Substance and Sexual Activity    Alcohol use: Not Currently    Drug use: Not Currently     Comment: previopus use of meth.     Sexual activity: Yes   Other Topics Concern    Not on file   Social History Narrative    Not on file     Social Determinants of Health     Financial Resource Strain: Not on file   Food Insecurity: Not on file   Transportation Needs: Not on file   Physical Activity: Not on file   Stress: Not on file   Social Connections: Not on file   Intimate Partner Violence: Not on file   Housing Stability: Not on file     Medications have been verified.    Objective   Pulse 64   Temp 98.3 °F  (36.8 °C)   Resp 18   SpO2 98%      Physical Exam   Physical Exam  Vitals and nursing note reviewed.   Cardiovascular:      Rate and Rhythm: Normal rate.      Pulses: Normal pulses.      Heart sounds: Normal heart sounds.   Pulmonary:      Effort: Pulmonary effort is normal.      Breath sounds: Normal breath sounds.   Musculoskeletal:        Hands:       Cervical back: Normal range of motion.   Neurological:      Mental Status: She is alert.

## 2024-01-22 ENCOUNTER — OFFICE VISIT (OUTPATIENT)
Dept: OBGYN CLINIC | Facility: CLINIC | Age: 19
End: 2024-01-22
Payer: COMMERCIAL

## 2024-01-22 VITALS
WEIGHT: 141 LBS | BODY MASS INDEX: 26.62 KG/M2 | HEIGHT: 61 IN | SYSTOLIC BLOOD PRESSURE: 109 MMHG | DIASTOLIC BLOOD PRESSURE: 69 MMHG | TEMPERATURE: 98.2 F | HEART RATE: 61 BPM

## 2024-01-22 DIAGNOSIS — S62.639B OPEN FRACTURE OF TUFT OF DISTAL PHALANX OF FINGER: Primary | ICD-10-CM

## 2024-01-22 PROCEDURE — 99214 OFFICE O/P EST MOD 30 MIN: CPT | Performed by: FAMILY MEDICINE

## 2024-01-22 PROCEDURE — 29130 APPL FINGER SPLINT STATIC: CPT | Performed by: FAMILY MEDICINE

## 2024-01-22 NOTE — PROGRESS NOTES
[facility] [address]  [place] [id number] [phone]      Assessment:  1. Open fracture of tuft of distal phalanx of finger  -     Ambulatory Referral to Orthopedic Surgery        Plan:  Patient Instructions   F/u 1 wk  Finger splint  Icing/elevation/OTC pain meds as needed  Finish Keflex   Return in about 1 week (around 1/29/2024) for Recheck.    Chief Complaint:  Chief Complaint   Patient presents with    Right Little Finger - Fracture       Subjective:   HPI    Patient ID: Maranda Rodríguez is a 18 y.o. female     Here c/o R pinky distal phalanx tuft fx  Closed car door on R hand  Seen in .  XR done. Note reviewed. Given abx  Having pain  Tylenol/motrin PRN  Sharp pain      RIGHT HAND     INDICATION:   M79.644: Pain in right finger(s).     COMPARISON: 11/3/2020 and 10/27/2020     VIEWS:  XR HAND 3+ VW RIGHT        For the purposes of institution wide universal language the following terms will apply: (thumb=1st digit/finger, index finger=2nd digit/finger, long finger=3rd digit/finger, ring=4th digit/finger and small finger=5th digit/finger)     FINDINGS:     There appears to be a hairline fracture of the tuft of the fifth distal phalanx, without significant displacement. Remainder of hand intact.     No significant degenerative changes.     No lytic or blastic osseous lesion.     Soft tissues are unremarkable.     IMPRESSION:     There appears to be a hairline nondisplaced fracture of the tuft of the fifth distal phalanx           Workstation performed: [id number]       Review of Systems   Constitutional:  Negative for fatigue and fever.   Respiratory:  Negative for shortness of breath.    Cardiovascular:  Negative for chest pain.   Gastrointestinal:  Negative for abdominal pain and nausea.   Genitourinary:  Negative for dysuria.   Musculoskeletal:  Positive for arthralgias.   Skin:  Negative for rash and wound.   Neurological:   "Negative for weakness and headaches.       Objective:  Vitals:  /69 (BP Location: Left arm, Patient Position: Sitting, Cuff Size: Standard)   Pulse 61   Temp 98.2 °F (36.8 °C) (Tympanic)   Ht 5' 1\" (1.549 m)   Wt 64 kg (141 lb)   BMI 26.64 kg/m²     The following portions of the patient's history were reviewed and updated as appropriate: allergies, current medications, past family history, past medical history, past social history, past surgical history, and problem list.    Physical exam:  Physical Exam  Constitutional:       Appearance: Normal appearance. She is normal weight.   Eyes:      Extraocular Movements: Extraocular movements intact.   Pulmonary:      Effort: Pulmonary effort is normal.   Musculoskeletal:         General: Tenderness present.      Cervical back: Normal range of motion.      Comments: R hand 5th finger- PIP/DID joint TTP  Pain with flexion  Dec sensation distal phalanx     Skin:     General: Skin is warm and dry.   Neurological:      General: No focal deficit present.      Mental Status: She is alert and oriented to person, place, and time. Mental status is at baseline.   Psychiatric:         Mood and Affect: Mood normal.         Behavior: Behavior normal.         Thought Content: Thought content normal.         Judgment: Judgment normal.       Ortho Exam    I have personally reviewed pertinent films in PACS and my interpretation is XR-R hand- distal phalanx nondisplaced tuft fx.    Splint application    Date/Time: 1/22/2024 1:00 PM    Performed by: Blade Ledezma MD  Authorized by: Blade Ledezma MD  Universal Protocol:  Consent: Verbal consent obtained.  Risks and benefits: risks, benefits and alternatives were discussed  Consent given by: patient  Timeout called at: 1/22/2024 1:14 PM.  Patient identity confirmed: verbally with patient    Pre-procedure details:     Sensation:  Normal  Procedure details:     Laterality:  Right    Location:  Finger    Finger:  R small finger    " Splint type:  Finger splint, static      Bldae Ledezma MD

## 2024-01-29 ENCOUNTER — OFFICE VISIT (OUTPATIENT)
Dept: OBGYN CLINIC | Facility: CLINIC | Age: 19
End: 2024-01-29
Payer: COMMERCIAL

## 2024-01-29 VITALS
WEIGHT: 141.8 LBS | HEART RATE: 65 BPM | DIASTOLIC BLOOD PRESSURE: 70 MMHG | SYSTOLIC BLOOD PRESSURE: 110 MMHG | TEMPERATURE: 99.8 F | BODY MASS INDEX: 26.77 KG/M2 | HEIGHT: 61 IN

## 2024-01-29 DIAGNOSIS — S62.636D CLOSED DISPLACED FRACTURE OF DISTAL PHALANX OF RIGHT LITTLE FINGER WITH ROUTINE HEALING, SUBSEQUENT ENCOUNTER: Primary | ICD-10-CM

## 2024-01-29 PROCEDURE — 99213 OFFICE O/P EST LOW 20 MIN: CPT | Performed by: FAMILY MEDICINE

## 2024-01-29 RX ORDER — CEPHALEXIN 500 MG/1
500 CAPSULE ORAL EVERY 8 HOURS SCHEDULED
COMMUNITY

## 2024-01-29 NOTE — PROGRESS NOTES
"[facility] [address] [address] [phone]      Assessment:  1. Closed displaced fracture of distal phalanx of right little finger with routine healing, subsequent encounter        Plan:  Patient Instructions   F/u 2 wks  Finger splint  Icing/heat/OTC pain meds as needed.     Return in about 2 weeks (around 2/12/2024) for Recheck.    Chief Complaint:  Chief Complaint   Patient presents with    Right Little Finger - Follow-up       Subjective:   HPI    Patient ID: Maranda Rodríguez is a 18 y.o. female        Here for f/u  R pinky distal phalanx tuft fx  Having less pain  Splint falls off- wearing sometime  Tylenol/motrin PRN  Sharp pain    Review of Systems   Constitutional:  Negative for fatigue and fever.   Respiratory:  Negative for shortness of breath.    Cardiovascular:  Negative for chest pain.   Gastrointestinal:  Negative for abdominal pain and nausea.   Genitourinary:  Negative for dysuria.   Musculoskeletal:  Positive for arthralgias.   Skin:  Negative for rash and wound.   Neurological:  Negative for weakness and headaches.       Objective:  Vitals:  /70 (BP Location: Left arm, Patient Position: Sitting, Cuff Size: Standard)   Pulse 65   Temp 99.8 °F (37.7 °C) (Tympanic)   Ht 5' 1\" (1.549 m)   Wt 64.3 kg (141 lb 12.8 oz)   BMI 26.79 kg/m²     The following portions of the patient's history were reviewed and updated as appropriate: allergies, current medications, past family history, past medical history, past social history, past surgical history, and problem list.    Physical exam:  Physical Exam  Constitutional:       Appearance: Normal appearance. She is normal weight.   Eyes:      Extraocular Movements: Extraocular movements intact.   Pulmonary:      Effort: Pulmonary effort is normal.   Musculoskeletal:         General: Tenderness present.      Cervical back: Normal range of motion.      Comments: R 5th " distal phalanx TTP  Wound c/d/i   Skin:     General: Skin is warm and dry.   Neurological:      General: No focal deficit present.      Mental Status: She is alert and oriented to person, place, and time. Mental status is at baseline.   Psychiatric:         Mood and Affect: Mood normal.         Behavior: Behavior normal.         Thought Content: Thought content normal.         Judgment: Judgment normal.       Ortho Exam          Blade Ledezma MD

## 2024-02-12 ENCOUNTER — OFFICE VISIT (OUTPATIENT)
Dept: OBGYN CLINIC | Facility: CLINIC | Age: 19
End: 2024-02-12
Payer: COMMERCIAL

## 2024-02-12 VITALS
TEMPERATURE: 99.6 F | WEIGHT: 138.6 LBS | HEIGHT: 61 IN | SYSTOLIC BLOOD PRESSURE: 111 MMHG | DIASTOLIC BLOOD PRESSURE: 70 MMHG | HEART RATE: 66 BPM | BODY MASS INDEX: 26.17 KG/M2

## 2024-02-12 DIAGNOSIS — S62.636D CLOSED DISPLACED FRACTURE OF DISTAL PHALANX OF RIGHT LITTLE FINGER WITH ROUTINE HEALING, SUBSEQUENT ENCOUNTER: Primary | ICD-10-CM

## 2024-02-12 PROCEDURE — 99213 OFFICE O/P EST LOW 20 MIN: CPT | Performed by: FAMILY MEDICINE

## 2024-02-12 NOTE — PROGRESS NOTES
"[facility] [address] [address] [phone]      Assessment:  1. Closed displaced fracture of distal phalanx of right little finger with routine healing, subsequent encounter        Plan:  Patient Instructions   F/u as needed  Splint as needed.   Return if symptoms worsen or fail to improve.    Chief Complaint:  Chief Complaint   Patient presents with    Right Little Finger - Follow-up       Subjective:   HPI    Patient ID: Maranda Rodríguez is a 18 y.o. female     Here for f/u  R pinky distal phalanx tuft fx  Having less pain  Was having more pain after being kicked by infant child  Not wearing splint  Tylenol PRN      Review of Systems   Constitutional:  Negative for fatigue and fever.   Respiratory:  Negative for shortness of breath.    Cardiovascular:  Negative for chest pain.   Gastrointestinal:  Negative for abdominal pain and nausea.   Genitourinary:  Negative for dysuria.   Musculoskeletal:  Positive for arthralgias.   Skin:  Negative for rash and wound.   Neurological:  Negative for weakness and headaches.       Objective:  Vitals:  /70 (BP Location: Left arm, Patient Position: Sitting, Cuff Size: Standard)   Pulse 66   Temp 99.6 °F (37.6 °C) (Tympanic)   Ht 5' 1\" (1.549 m)   Wt 62.9 kg (138 lb 9.6 oz)   BMI 26.19 kg/m²     The following portions of the patient's history were reviewed and updated as appropriate: allergies, current medications, past family history, past medical history, past social history, past surgical history, and problem list.    Physical exam:  Physical Exam  Constitutional:       Appearance: Normal appearance. She is normal weight.   Eyes:      Extraocular Movements: Extraocular movements intact.   Pulmonary:      Effort: Pulmonary effort is normal.   Musculoskeletal:         General: Tenderness present.      Cervical back: Normal range of motion.      Comments: R pinky- TTP distal " phalanx- mild   Skin:     General: Skin is warm and dry.   Neurological:      General: No focal deficit present.      Mental Status: She is alert and oriented to person, place, and time. Mental status is at baseline.   Psychiatric:         Mood and Affect: Mood normal.         Behavior: Behavior normal.         Thought Content: Thought content normal.         Judgment: Judgment normal.       Ortho Exam          Blade Ledezma MD

## 2024-02-29 ENCOUNTER — OFFICE VISIT (OUTPATIENT)
Dept: URGENT CARE | Facility: CLINIC | Age: 19
End: 2024-02-29
Payer: COMMERCIAL

## 2024-02-29 ENCOUNTER — APPOINTMENT (OUTPATIENT)
Dept: RADIOLOGY | Facility: CLINIC | Age: 19
End: 2024-02-29
Payer: COMMERCIAL

## 2024-02-29 VITALS
RESPIRATION RATE: 18 BRPM | TEMPERATURE: 98 F | DIASTOLIC BLOOD PRESSURE: 72 MMHG | HEART RATE: 62 BPM | SYSTOLIC BLOOD PRESSURE: 119 MMHG | OXYGEN SATURATION: 97 %

## 2024-02-29 DIAGNOSIS — Z20.828 CONTACT WITH AND (SUSPECTED) EXPOSURE TO OTHER VIRAL COMMUNICABLE DISEASES: Primary | ICD-10-CM

## 2024-02-29 DIAGNOSIS — R19.7 VOMITING AND DIARRHEA: ICD-10-CM

## 2024-02-29 DIAGNOSIS — R10.30 LOWER ABDOMINAL PAIN: ICD-10-CM

## 2024-02-29 DIAGNOSIS — R11.10 VOMITING AND DIARRHEA: ICD-10-CM

## 2024-02-29 DIAGNOSIS — B34.9 VIRAL ILLNESS: ICD-10-CM

## 2024-02-29 LAB
SL AMB  POCT GLUCOSE, UA: NEGATIVE
SL AMB LEUKOCYTE ESTERASE,UA: NEGATIVE
SL AMB POCT BILIRUBIN,UA: NEGATIVE
SL AMB POCT BLOOD,UA: NEGATIVE
SL AMB POCT CLARITY,UA: CLEAR
SL AMB POCT COLOR,UA: NORMAL
SL AMB POCT KETONES,UA: NEGATIVE
SL AMB POCT NITRITE,UA: NEGATIVE
SL AMB POCT PH,UA: 7.5
SL AMB POCT SPECIFIC GRAVITY,UA: 1.01
SL AMB POCT URINE HCG: NEGATIVE
SL AMB POCT URINE PROTEIN: NEGATIVE
SL AMB POCT UROBILINOGEN: 0.2

## 2024-02-29 PROCEDURE — 99214 OFFICE O/P EST MOD 30 MIN: CPT | Performed by: NURSE PRACTITIONER

## 2024-02-29 PROCEDURE — 74022 RADEX COMPL AQT ABD SERIES: CPT

## 2024-02-29 PROCEDURE — 81002 URINALYSIS NONAUTO W/O SCOPE: CPT | Performed by: NURSE PRACTITIONER

## 2024-02-29 PROCEDURE — 81025 URINE PREGNANCY TEST: CPT | Performed by: NURSE PRACTITIONER

## 2024-02-29 PROCEDURE — 87636 SARSCOV2 & INF A&B AMP PRB: CPT | Performed by: NURSE PRACTITIONER

## 2024-02-29 RX ORDER — ONDANSETRON 4 MG/1
4 TABLET, FILM COATED ORAL EVERY 8 HOURS PRN
Qty: 20 TABLET | Refills: 0 | Status: SHIPPED | OUTPATIENT
Start: 2024-02-29

## 2024-02-29 RX ORDER — FLUOXETINE 20 MG/1
20 TABLET, FILM COATED ORAL EVERY MORNING
COMMUNITY
Start: 2024-02-12

## 2024-02-29 RX ORDER — TRAZODONE HYDROCHLORIDE 50 MG/1
TABLET ORAL
COMMUNITY
Start: 2024-02-05

## 2024-02-29 NOTE — PROGRESS NOTES
"  [facility]        NAME: Maranda Rodríguez is a 18 y.o. female  : 2005    MRN: [id number]  DATE: 2024  TIME: 2:19 PM    Assessment and Plan   Contact with and (suspected) exposure to other viral communicable diseases [Z20.828]  1. Contact with and (suspected) exposure to other viral communicable diseases  Covid/Flu- Office Collect Normal    Covid/Flu- Office Collect Normal      2. Viral illness  ondansetron (ZOFRAN) 4 mg tablet      3. Lower abdominal pain  POCT urine dip    POCT urine HCG    XR abdomen obstruction series      4. Vomiting and diarrhea  ondansetron (ZOFRAN) 4 mg tablet            Patient Instructions       Follow up with PCP in 3-5 days.  Proceed to  ER if symptoms worsen.    If tests have been performed at [facility], our office will contact you with results if changes need to be made to the care plan discussed with you at the visit.  You can review your full results on [facility] MyChart.    Chief Complaint     Chief Complaint   Patient presents with    Vomiting     C/o fatigue, generalized body aches, loose stools, and 2 episodes of vomiting onset \"today\". Denies other symptoms. Denies fever. Pt denies taking any OTC medications today. Denies home COVID testing.     Fatigue    Generalized Body Aches         History of Present Illness       This is a 18 year old female who states that about 4 days ago she developed fatigue, generalized body aches, loose stools and 2 episodes of vomiting.  Denies fevers.  She has not covid tested and not taken any medications today. States that she has a history of only having 2-3 bowel movements per month. She states she has been worked up for this and given miralax. She states that she did take a miralax to help get every thing to move.  She denies any abdominal surgery or hx of bowel obstruction. States on depo.  PMH is listed.     Vomiting   Associated symptoms include abdominal pain, diarrhea and myalgias.   Fatigue  Associated " symptoms include abdominal pain, fatigue, myalgias and vomiting.   Generalized Body Aches  Associated symptoms include fatigue, abdominal pain, constipation, diarrhea and vomiting.       Review of Systems   Review of Systems   Constitutional:  Positive for fatigue.   HENT: Negative.     Eyes: Negative.    Respiratory: Negative.     Cardiovascular: Negative.    Gastrointestinal:  Positive for abdominal pain, constipation, diarrhea and vomiting.   Endocrine: Negative.    Genitourinary: Negative.    Musculoskeletal:  Positive for myalgias.   Skin: Negative.    Allergic/Immunologic: Negative.    Neurological: Negative.    Hematological: Negative.    Psychiatric/Behavioral: Negative.           Current Medications       Current Outpatient Medications:     FLUoxetine (PROzac) 20 MG tablet, Take 20 mg by mouth every morning, Disp: , Rfl:     ondansetron (ZOFRAN) 4 mg tablet, Take 1 tablet (4 mg total) by mouth every 8 (eight) hours as needed for nausea or vomiting, Disp: 20 tablet, Rfl: 0    sertraline (ZOLOFT) 50 mg tablet, Take 50 mg by mouth daily, Disp: , Rfl:     traZODone (DESYREL) 50 mg tablet, TAKE ONE OR TWO AT BEDTIME, Disp: , Rfl:     albuterol (PROVENTIL HFA,VENTOLIN HFA) 90 mcg/act inhaler, Inhale 2 puffs every 6 (six) hours as needed for wheezing  , Disp: , Rfl:     cephalexin (KEFLEX) 500 mg capsule, Take 500 mg by mouth every 8 (eight) hours, Disp: , Rfl:     famotidine (PEPCID) 20 mg tablet, TAKE 1 PO BID PRN for any GERD symptoms., Disp: 90 tablet, Rfl: 2    medroxyPROGESTERone acetate (DEPO-PROVERA SYRINGE) 150 mg/mL injection, Inject 150 mg into a muscle every 3 (three) months, Disp: , Rfl:     polyethylene glycol (GLYCOLAX) 17 GM/SCOOP powder, Take 17 g by mouth 2 (two) times a day, Disp: 578 g, Rfl: 10    Current Allergies     Allergies as of 02/29/2024 - Reviewed 02/29/2024   Allergen Reaction Noted    Latex Rash 04/12/2019            The following portions of the patient's history were reviewed and  updated as appropriate: allergies, current medications, past family history, past medical history, past social history, past surgical history and problem list.     Past Medical History:   Diagnosis Date    Anxiety     Asthma     Back pain     Depression     GERD (gastroesophageal reflux disease)     POTS (postural orthostatic tachycardia syndrome)        Past Surgical History:   Procedure Laterality Date    TONSILLECTOMY      TONSILLECTOMY         Family History   Problem Relation Age of Onset    Bipolar disorder Mother     Scoliosis Mother     Allergy (severe) Sister     Kidney disease Brother     Breast cancer Maternal Aunt     Diabetes Maternal Grandmother          Medications have been verified.        Objective   /72 (BP Location: Right arm, Patient Position: Sitting)   Pulse 62   Temp 98 °F (36.7 °C) (Temporal)   Resp 18   SpO2 97%   Breastfeeding No   No LMP recorded. Patient has had an injection.       Physical Exam     Physical Exam  Vitals and nursing note reviewed.   Constitutional:       General: She is not in acute distress.     Appearance: Normal appearance. She is normal weight. She is not ill-appearing, toxic-appearing or diaphoretic.   HENT:      Head: Normocephalic and atraumatic.      Right Ear: Tympanic membrane and ear canal normal.      Left Ear: Tympanic membrane and ear canal normal.      Nose: Nose normal. No congestion or rhinorrhea.      Mouth/Throat:      Mouth: Mucous membranes are moist.      Pharynx: No oropharyngeal exudate or posterior oropharyngeal erythema.   Eyes:      Extraocular Movements: Extraocular movements intact.   Cardiovascular:      Rate and Rhythm: Normal rate and regular rhythm.      Pulses: Normal pulses.      Heart sounds: Normal heart sounds. No murmur heard.  Pulmonary:      Effort: Pulmonary effort is normal. No respiratory distress.      Breath sounds: Normal breath sounds. No stridor. No wheezing, rhonchi or rales.   Chest:      Chest wall: No  tenderness.   Abdominal:      General: There is no distension.      Palpations: Abdomen is soft.      Tenderness: There is abdominal tenderness in the periumbilical area, suprapubic area and left lower quadrant.      Comments: BS hypoactive    Musculoskeletal:         General: Normal range of motion.      Cervical back: Normal range of motion and neck supple.   Skin:     General: Skin is warm and dry.      Capillary Refill: Capillary refill takes less than 2 seconds.   Neurological:      General: No focal deficit present.      Mental Status: She is alert and oriented to person, place, and time.   Psychiatric:         Mood and Affect: Mood normal.         Behavior: Behavior normal.         Thought Content: Thought content normal.         Judgment: Judgment normal.           Poct urine  negative for infection  Ua hcg negative       Preliminary reading obstruction series  Non specific gas bowel pattern  No obstruction or fecal impaction noted  Waiting on rad read

## 2024-02-29 NOTE — PATIENT INSTRUCTIONS
Your urine was negative for infection and pregnancy.  Your xray was preliminarily read by your provider.  A radiologist will read the xray and you will be notified if it is abnormal.    You are to eat a BRAT diet, bananas, rice applesauce and toast.    You have a covid/flu test pending.  Download SL [id number] for the results in 24-48 hours. You will be notified only if abnormal  Follow up with your PCP in 3-5 days  Go to the ED if symptoms worsen      If tests have been performed at [facility], our office will contact you with results if changes need to be made to the care plan discussed with you at the visit.  You can review your full results on [facility] MyChart.

## 2024-03-22 ENCOUNTER — OFFICE VISIT (OUTPATIENT)
Dept: URGENT CARE | Facility: CLINIC | Age: 19
End: 2024-03-22
Payer: COMMERCIAL

## 2024-03-22 VITALS
OXYGEN SATURATION: 96 % | TEMPERATURE: 98.4 F | DIASTOLIC BLOOD PRESSURE: 54 MMHG | RESPIRATION RATE: 18 BRPM | SYSTOLIC BLOOD PRESSURE: 98 MMHG | HEART RATE: 80 BPM | BODY MASS INDEX: 26.01 KG/M2 | HEIGHT: 61 IN | WEIGHT: 137.8 LBS

## 2024-03-22 DIAGNOSIS — J01.90 ACUTE SINUSITIS, RECURRENCE NOT SPECIFIED, UNSPECIFIED LOCATION: Primary | ICD-10-CM

## 2024-03-22 PROCEDURE — 99213 OFFICE O/P EST LOW 20 MIN: CPT | Performed by: PHYSICIAN ASSISTANT

## 2024-03-22 RX ORDER — AMOXICILLIN 875 MG/1
875 TABLET, COATED ORAL 2 TIMES DAILY
Qty: 20 TABLET | Refills: 0 | Status: SHIPPED | OUTPATIENT
Start: 2024-03-22 | End: 2024-04-01

## 2024-03-22 NOTE — PROGRESS NOTES
[facility]    NAME: Maranda Rodríguez is a 18 y.o. female  : 2005    MRN: [id number]  DATE: 2024  TIME: 3:18 PM    Assessment and Plan   Acute sinusitis, recurrence not specified, unspecified location [J01.90]  1. Acute sinusitis, recurrence not specified, unspecified location  amoxicillin (AMOXIL) 875 mg tablet          Patient Instructions     Patient Instructions   I have prescribed an antibiotic for the infection.  Please take the antibiotic as prescribed and finish the entire prescription.  Take an over the counter probiotic or eat yogurt with live cultures in it (activia) to keep good bacteria in the gut and help prevent diarrhea.  Wash hands frequently to prevent the spread of infection.  Can use over the counter cough and cold medications to help with symptoms.  Ibuprofen and/or tylenol as needed for pain or fever.  If not improving over the next 7-10 days, follow up with PCP.          Chief Complaint     Chief Complaint   Patient presents with    Cold Like Symptoms     Chest congestion, sore throat, vomiting, diarrhea x 1 week.       History of Present Illness   18-year-old female here with complaint of chest congestion, sore throat nasal congestion.  Has had occasional vomiting and diarrhea.  Has been sick for the last week.  No fever or chills.        Review of Systems   Review of Systems   Constitutional:  Negative for appetite change, chills and fever.   HENT:  Positive for congestion, postnasal drip, rhinorrhea, sinus pressure and sore throat. Negative for ear discharge, ear pain, facial swelling and sneezing.    Respiratory:  Positive for cough. Negative for shortness of breath and wheezing.    Gastrointestinal:  Positive for diarrhea, nausea and vomiting.   Neurological:  Positive for headaches.       Current Medications     Current Outpatient Medications:     amoxicillin (AMOXIL) 875 mg tablet, Take 1 tablet (875 mg total) by mouth 2 (two) times a day for 10 days, Disp: 20  tablet, Rfl: 0    FLUoxetine (PROzac) 20 MG tablet, Take 20 mg by mouth every morning, Disp: , Rfl:     medroxyPROGESTERone acetate (DEPO-PROVERA SYRINGE) 150 mg/mL injection, Inject 150 mg into a muscle every 3 (three) months, Disp: , Rfl:     ondansetron (ZOFRAN) 4 mg tablet, Take 1 tablet (4 mg total) by mouth every 8 (eight) hours as needed for nausea or vomiting, Disp: 20 tablet, Rfl: 0    polyethylene glycol (GLYCOLAX) 17 GM/SCOOP powder, Take 17 g by mouth 2 (two) times a day, Disp: 578 g, Rfl: 10    traZODone (DESYREL) 50 mg tablet, TAKE ONE OR TWO AT BEDTIME, Disp: , Rfl:     albuterol (PROVENTIL HFA,VENTOLIN HFA) 90 mcg/act inhaler, Inhale 2 puffs every 6 (six) hours as needed for wheezing   (Patient not taking: Reported on 3/22/2024), Disp: , Rfl:     cephalexin (KEFLEX) 500 mg capsule, Take 500 mg by mouth every 8 (eight) hours (Patient not taking: Reported on 3/22/2024), Disp: , Rfl:     famotidine (PEPCID) 20 mg tablet, TAKE 1 PO BID PRN for any GERD symptoms. (Patient not taking: Reported on 3/22/2024), Disp: 90 tablet, Rfl: 2    sertraline (ZOLOFT) 50 mg tablet, Take 50 mg by mouth daily (Patient not taking: Reported on 3/22/2024), Disp: , Rfl:     Current Allergies     Allergies as of 03/22/2024 - Reviewed 03/22/2024   Allergen Reaction Noted    Latex Rash 04/12/2019          The following portions of the patient's history were reviewed and updated as appropriate: allergies, current medications, past family history, past medical history, past social history, past surgical history and problem list.   Past Medical History:   Diagnosis Date    Anxiety     Asthma     Back pain     Depression     GERD (gastroesophageal reflux disease)     POTS (postural orthostatic tachycardia syndrome)      Past Surgical History:   Procedure Laterality Date    TONSILLECTOMY      TONSILLECTOMY       Family History   Problem Relation Age of Onset    Bipolar disorder Mother     Scoliosis Mother     Allergy (severe) Sister   "   Kidney disease Brother     Breast cancer Maternal Aunt     Diabetes Maternal Grandmother      Social History     Socioeconomic History    Marital status: Single     Spouse name: Not on file    Number of children: Not on file    Years of education: Not on file    Highest education level: Not on file   Occupational History    Not on file   Tobacco Use    Smoking status: Every Day     Current packs/day: 0.25     Types: Cigarettes     Passive exposure: Current    Smokeless tobacco: Never    Tobacco comments:     one cigarette a day   Vaping Use    Vaping status: Every Day    Substances: Nicotine, Flavoring   Substance and Sexual Activity    Alcohol use: Not Currently    Drug use: Not Currently     Comment: previopus use of meth.     Sexual activity: Yes   Other Topics Concern    Not on file   Social History Narrative    Not on file     Social Determinants of Health     Financial Resource Strain: Not on file   Food Insecurity: Not on file   Transportation Needs: Not on file   Physical Activity: Not on file   Stress: Not on file   Social Connections: Not on file   Intimate Partner Violence: Not on file   Housing Stability: Not on file     Medications have been verified.    Objective   BP 98/54 (BP Location: Left arm, Patient Position: Sitting, Cuff Size: Standard)   Pulse 80   Temp 98.4 °F (36.9 °C) (Temporal)   Resp 18   Ht 5' 1\" (1.549 m)   Wt 62.5 kg (137 lb 12.8 oz)   SpO2 96%   BMI 26.04 kg/m²      Physical Exam   Physical Exam  Vitals and nursing note reviewed.   Constitutional:       General: She is not in acute distress.     Appearance: She is well-developed.   HENT:      Head: Normocephalic and atraumatic.      Right Ear: Tympanic membrane normal.      Left Ear: Tympanic membrane normal.      Nose: Congestion and rhinorrhea present. No mucosal edema.      Right Sinus: No maxillary sinus tenderness or frontal sinus tenderness.      Left Sinus: No maxillary sinus tenderness or frontal sinus tenderness.    "   Mouth/Throat:      Pharynx: Posterior oropharyngeal erythema present. No oropharyngeal exudate.   Eyes:      Conjunctiva/sclera: Conjunctivae normal.   Cardiovascular:      Rate and Rhythm: Normal rate and regular rhythm.      Heart sounds: Normal heart sounds. No murmur heard.  Pulmonary:      Effort: Pulmonary effort is normal. No respiratory distress.      Breath sounds: Normal breath sounds.

## 2024-03-26 ENCOUNTER — APPOINTMENT (OUTPATIENT)
Dept: RADIOLOGY | Facility: CLINIC | Age: 19
End: 2024-03-26
Payer: COMMERCIAL

## 2024-03-26 ENCOUNTER — OFFICE VISIT (OUTPATIENT)
Dept: OBGYN CLINIC | Facility: CLINIC | Age: 19
End: 2024-03-26
Payer: COMMERCIAL

## 2024-03-26 ENCOUNTER — OFFICE VISIT (OUTPATIENT)
Dept: URGENT CARE | Facility: CLINIC | Age: 19
End: 2024-03-26
Payer: COMMERCIAL

## 2024-03-26 VITALS
TEMPERATURE: 98.7 F | SYSTOLIC BLOOD PRESSURE: 99 MMHG | OXYGEN SATURATION: 99 % | RESPIRATION RATE: 18 BRPM | DIASTOLIC BLOOD PRESSURE: 55 MMHG | HEART RATE: 77 BPM

## 2024-03-26 VITALS — HEIGHT: 61 IN | BODY MASS INDEX: 26.04 KG/M2

## 2024-03-26 DIAGNOSIS — S80.211A ABRASION OF RIGHT KNEE, INITIAL ENCOUNTER: ICD-10-CM

## 2024-03-26 DIAGNOSIS — M23.91 INTERNAL DERANGEMENT OF RIGHT KNEE: Primary | ICD-10-CM

## 2024-03-26 DIAGNOSIS — M25.461 EFFUSION OF RIGHT KNEE: ICD-10-CM

## 2024-03-26 DIAGNOSIS — S89.91XA INJURY OF RIGHT KNEE, INITIAL ENCOUNTER: ICD-10-CM

## 2024-03-26 DIAGNOSIS — M25.461 EFFUSION OF RIGHT KNEE: Primary | ICD-10-CM

## 2024-03-26 PROCEDURE — 20610 DRAIN/INJ JOINT/BURSA W/O US: CPT | Performed by: ORTHOPAEDIC SURGERY

## 2024-03-26 PROCEDURE — 99213 OFFICE O/P EST LOW 20 MIN: CPT | Performed by: ORTHOPAEDIC SURGERY

## 2024-03-26 PROCEDURE — 99213 OFFICE O/P EST LOW 20 MIN: CPT | Performed by: PHYSICIAN ASSISTANT

## 2024-03-26 PROCEDURE — 73564 X-RAY EXAM KNEE 4 OR MORE: CPT
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
## 2024-03-27 ENCOUNTER — TELEPHONE (OUTPATIENT)
Age: 19
End: 2024-03-27

## 2024-03-27 NOTE — TELEPHONE ENCOUNTER
Caller: aylin    Doctor: lyric    Reason for call: patient was seen yesterday. She is experiencing cold to the touch, swollen, some numbness and blotchy purple skin color.  Please advise    Call back#: [phone]

## 2024-04-01 ENCOUNTER — HOSPITAL ENCOUNTER (OUTPATIENT)
Dept: MRI IMAGING | Facility: HOSPITAL | Age: 19
Discharge: HOME | End: 2024-04-01
Attending: ORTHOPAEDIC SURGERY
Payer: COMMERCIAL

## 2024-04-01 DIAGNOSIS — M25.461 EFFUSION OF RIGHT KNEE: ICD-10-CM

## 2024-04-01 DIAGNOSIS — M23.91 INTERNAL DERANGEMENT OF RIGHT KNEE: ICD-10-CM

## 2024-04-01 PROCEDURE — 73721 MRI JNT OF LWR EXTRE W/O DYE: CPT

## 2024-04-16 ENCOUNTER — OFFICE VISIT (OUTPATIENT)
Dept: OBGYN CLINIC | Facility: CLINIC | Age: 19
End: 2024-04-16
Payer: COMMERCIAL

## 2024-04-16 VITALS
SYSTOLIC BLOOD PRESSURE: 114 MMHG | BODY MASS INDEX: 25.86 KG/M2 | HEIGHT: 61 IN | WEIGHT: 137 LBS | HEART RATE: 71 BPM | DIASTOLIC BLOOD PRESSURE: 74 MMHG

## 2024-04-16 DIAGNOSIS — S80.01XA CONTUSION OF RIGHT KNEE, INITIAL ENCOUNTER: Primary | ICD-10-CM

## 2024-04-16 PROCEDURE — 99213 OFFICE O/P EST LOW 20 MIN: CPT | Performed by: ORTHOPAEDIC SURGERY

## 2024-04-16 NOTE — PROGRESS NOTES
ASSESSMENT/PLAN:    Diagnoses and all orders for this visit:    Contusion of right knee, initial encounter        An MRI of the patient's right knee was negative for any internal derangement.  The menisci are intact.  Possible treatment options were discussed with the patient.  She was given a home exercise program.  She will follow-up with our office as needed.  She is acceptable to this plan.    Return if symptoms worsen or fail to improve.          The patient offers no complaints of pain along the right knee.  Fortunately MRI showed no internal derangement.  Physical therapy was discussed but declined.  Continue home exercise program.  Follow-up on an as-needed basis.  If her condition changes, she would not hesitate to let us know      _____________________________________________________  CHIEF COMPLAINT:  Chief Complaint   Patient presents with    Right Knee - Follow-up         SUBJECTIVE:  Maranda Rodríguez is a 18 y.o. female who presents to our office to her review MRI results of her right knee.  She complains of occasional right knee discomfort.  She denies any numbness or tingling.  She denies any fever or chills.    The following portions of the patient's history were reviewed and updated as appropriate: allergies, current medications, past family history, past medical history, past social history, past surgical history and problem list.    PAST MEDICAL HISTORY:  Past Medical History:   Diagnosis Date    Anxiety     Asthma     Back pain     Depression     GERD (gastroesophageal reflux disease)     POTS (postural orthostatic tachycardia syndrome)        PAST SURGICAL HISTORY:  Past Surgical History:   Procedure Laterality Date    TONSILLECTOMY      TONSILLECTOMY         FAMILY HISTORY:  Family History   Problem Relation Age of Onset    Bipolar disorder Mother     Scoliosis Mother     Allergy (severe) Sister     Kidney disease Brother     Breast cancer Maternal Aunt     Diabetes Maternal Grandmother         SOCIAL HISTORY:  Social History     Tobacco Use    Smoking status: Every Day     Current packs/day: 0.25     Types: Cigarettes     Passive exposure: Current    Smokeless tobacco: Never    Tobacco comments:     one cigarette a day   Vaping Use    Vaping status: Every Day    Substances: Nicotine, Flavoring   Substance Use Topics    Alcohol use: Not Currently    Drug use: Not Currently     Comment: previopus use of meth.        MEDICATIONS:    Current Outpatient Medications:     FLUoxetine (PROzac) 20 MG tablet, Take 20 mg by mouth every morning, Disp: , Rfl:     medroxyPROGESTERone acetate (DEPO-PROVERA SYRINGE) 150 mg/mL injection, Inject 150 mg into a muscle every 3 (three) months, Disp: , Rfl:     ondansetron (ZOFRAN) 4 mg tablet, Take 1 tablet (4 mg total) by mouth every 8 (eight) hours as needed for nausea or vomiting, Disp: 20 tablet, Rfl: 0    polyethylene glycol (GLYCOLAX) 17 GM/SCOOP powder, Take 17 g by mouth 2 (two) times a day, Disp: 578 g, Rfl: 10    traZODone (DESYREL) 50 mg tablet, TAKE ONE OR TWO AT BEDTIME, Disp: , Rfl:     albuterol (PROVENTIL HFA,VENTOLIN HFA) 90 mcg/act inhaler, Inhale 2 puffs every 6 (six) hours as needed for wheezing   (Patient not taking: Reported on 3/22/2024), Disp: , Rfl:     cephalexin (KEFLEX) 500 mg capsule, Take 500 mg by mouth every 8 (eight) hours (Patient not taking: Reported on 3/22/2024), Disp: , Rfl:     famotidine (PEPCID) 20 mg tablet, TAKE 1 PO BID PRN for any GERD symptoms. (Patient not taking: Reported on 3/22/2024), Disp: 90 tablet, Rfl: 2    sertraline (ZOLOFT) 50 mg tablet, Take 50 mg by mouth daily (Patient not taking: Reported on 3/22/2024), Disp: , Rfl:     ALLERGIES:  Allergies   Allergen Reactions    Latex Rash       ROS:  Review of Systems     Constitutional: Negative for fatigue, fever or loss of appetite.   HENT: Negative.    Respiratory: Negative for shortness of breath, dyspnea.    Cardiovascular: Negative for chest pain/tightness.  "  Gastrointestinal: Negative for abdominal pain, N/V.   Endocrine: Negative for cold/heat intolerance, unexplained weight loss/gain.   Genitourinary: Negative for flank pain, dysuria, hematuria.   Musculoskeletal: Positive for arthralgia   Skin: Negative for rash.    Neurological: Negative for numbness or tingling  Psychiatric/Behavioral: Negative for agitation.  _____________________________________________________  PHYSICAL EXAMINATION:    Blood pressure 114/74, pulse 71, height 5' 1\" (1.549 m), weight 62.1 kg (137 lb), not currently breastfeeding.    Constitutional: Oriented to person, place, and time. Appears well-developed and well-nourished. No distress.   HENT:   Head: Normocephalic.   Eyes: Conjunctivae are normal. Right eye exhibits no discharge. Left eye exhibits no discharge. No scleral icterus.   Cardiovascular: Normal rate.    Pulmonary/Chest: Effort normal.   Neurological: Alert and oriented to person, place, and time.   Skin: Skin is warm and dry. No rash noted. Not diaphoretic. No erythema. No pallor.   Psychiatric: Normal mood and affect. Behavior is normal. Judgment and thought content normal.      MUSCULOSKELETAL EXAMINATION:   Physical Exam  Ortho Exam    Right lower extremity is neurovascular intact  Toes are pink and mobile  Compartments are soft  Range of motion of the knee is from 0 to 125 degrees  No tenderness to palpation  No effusion present  Brisk up refill and sensation intact  Objective:  BP Readings from Last 1 Encounters:   04/16/24 114/74      Wt Readings from Last 1 Encounters:   04/16/24 62.1 kg (137 lb)        BMI:   Estimated body mass index is 25.89 kg/m² as calculated from the following:    Height as of this encounter: 5' 1\" (1.549 m).    Weight as of this encounter: 62.1 kg (137 lb).          Scribe Attestation      I,:  Joseph Hughes PA-C am acting as a scribe while in the presence of the attending physician.:       I,:  Syd De Anda, DO personally performed " the services described in this documentation    as scribed in my presence.:

## 2024-05-21 ENCOUNTER — HOSPITAL ENCOUNTER (EMERGENCY)
Facility: HOSPITAL | Age: 19
Discharge: HOME | End: 2024-05-22
Attending: EMERGENCY MEDICINE
Payer: COMMERCIAL

## 2024-05-21 ENCOUNTER — APPOINTMENT (EMERGENCY)
Dept: ULTRASOUND IMAGING | Facility: HOSPITAL | Age: 19
End: 2024-05-21
Payer: COMMERCIAL

## 2024-05-21 VITALS
HEIGHT: 62 IN | DIASTOLIC BLOOD PRESSURE: 61 MMHG | SYSTOLIC BLOOD PRESSURE: 107 MMHG | RESPIRATION RATE: 18 BRPM | HEART RATE: 57 BPM | WEIGHT: 130 LBS | BODY MASS INDEX: 23.92 KG/M2 | TEMPERATURE: 97.3 F | OXYGEN SATURATION: 99 %

## 2024-05-21 DIAGNOSIS — N73.0 PID (ACUTE PELVIC INFLAMMATORY DISEASE): Primary | ICD-10-CM

## 2024-05-21 LAB
BASOPHILS # BLD AUTO: 0.03 THOUSANDS/ÂΜL (ref 0–0.1)
BASOPHILS NFR BLD AUTO: 1 % (ref 0–1)
BILIRUB UR QL STRIP: NEGATIVE
CLARITY UR: CLEAR
COLOR UR: YELLOW
EOSINOPHIL # BLD AUTO: 0.37 THOUSAND/ÂΜL (ref 0–0.61)
EOSINOPHIL NFR BLD AUTO: 6 % (ref 0–6)
ERYTHROCYTE [DISTWIDTH] IN BLOOD BY AUTOMATED COUNT: 12 % (ref 11.6–15.1)
EXT PREGNANCY TEST URINE: NEGATIVE
EXT. CONTROL: NORMAL
GLUCOSE UR STRIP-MCNC: NEGATIVE MG/DL
HCT VFR BLD AUTO: 43.2 % (ref 34.8–46.1)
HGB BLD-MCNC: 14.2 G/DL (ref 11.5–15.4)
HGB UR QL STRIP.AUTO: NEGATIVE
IMM GRANULOCYTES # BLD AUTO: 0.01 THOUSAND/UL (ref 0–0.2)
IMM GRANULOCYTES NFR BLD AUTO: 0 % (ref 0–2)
KETONES UR STRIP-MCNC: NEGATIVE MG/DL
LEUKOCYTE ESTERASE UR QL STRIP: NEGATIVE
LYMPHOCYTES # BLD AUTO: 3.01 THOUSANDS/ÂΜL (ref 0.6–4.47)
LYMPHOCYTES NFR BLD AUTO: 46 % (ref 14–44)
MCH RBC QN AUTO: 30.6 PG (ref 26.8–34.3)
MCHC RBC AUTO-ENTMCNC: 32.9 G/DL (ref 31.4–37.4)
MCV RBC AUTO: 93 FL (ref 82–98)
MONOCYTES # BLD AUTO: 0.43 THOUSAND/ÂΜL (ref 0.17–1.22)
MONOCYTES NFR BLD AUTO: 7 % (ref 4–12)
NEUTROPHILS # BLD AUTO: 2.57 THOUSANDS/ÂΜL (ref 1.85–7.62)
NEUTS SEG NFR BLD AUTO: 40 % (ref 43–75)
NITRITE UR QL STRIP: NEGATIVE
NRBC BLD AUTO-RTO: 0 /100 WBCS
PH UR STRIP.AUTO: 5.5 [PH]
PLATELET # BLD AUTO: 170 THOUSANDS/UL (ref 149–390)
PMV BLD AUTO: 10.2 FL (ref 8.9–12.7)
PROT UR STRIP-MCNC: NEGATIVE MG/DL
RBC # BLD AUTO: 4.64 MILLION/UL (ref 3.81–5.12)
SP GR UR STRIP.AUTO: >=1.03 (ref 1–1.03)
UROBILINOGEN UR STRIP-ACNC: <2 MG/DL
WBC # BLD AUTO: 6.42 THOUSAND/UL (ref 4.31–10.16)

## 2024-05-21 PROCEDURE — 85025 COMPLETE CBC W/AUTO DIFF WBC: CPT | Performed by: EMERGENCY MEDICINE

## 2024-05-21 PROCEDURE — 76830 TRANSVAGINAL US NON-OB: CPT

## 2024-05-21 PROCEDURE — 99283 EMERGENCY DEPT VISIT LOW MDM: CPT

## 2024-05-21 PROCEDURE — 87491 CHLMYD TRACH DNA AMP PROBE: CPT | Performed by: EMERGENCY MEDICINE

## 2024-05-21 PROCEDURE — 76705 ECHO EXAM OF ABDOMEN: CPT

## 2024-05-21 PROCEDURE — 96365 THER/PROPH/DIAG IV INF INIT: CPT

## 2024-05-21 PROCEDURE — 81003 URINALYSIS AUTO W/O SCOPE: CPT | Performed by: EMERGENCY MEDICINE

## 2024-05-21 PROCEDURE — 87591 N.GONORRHOEAE DNA AMP PROB: CPT | Performed by: EMERGENCY MEDICINE

## 2024-05-21 PROCEDURE — 96367 TX/PROPH/DG ADDL SEQ IV INF: CPT

## 2024-05-21 PROCEDURE — 96375 TX/PRO/DX INJ NEW DRUG ADDON: CPT

## 2024-05-21 PROCEDURE — 76856 US EXAM PELVIC COMPLETE: CPT

## 2024-05-21 PROCEDURE — 81514 NFCT DS BV&VAGINITIS DNA ALG: CPT | Performed by: EMERGENCY MEDICINE

## 2024-05-21 PROCEDURE — 99284 EMERGENCY DEPT VISIT MOD MDM: CPT | Performed by: EMERGENCY MEDICINE

## 2024-05-21 PROCEDURE — 80053 COMPREHEN METABOLIC PANEL: CPT | Performed by: EMERGENCY MEDICINE

## 2024-05-21 PROCEDURE — 36415 COLL VENOUS BLD VENIPUNCTURE: CPT | Performed by: EMERGENCY MEDICINE

## 2024-05-21 PROCEDURE — 81025 URINE PREGNANCY TEST: CPT | Performed by: EMERGENCY MEDICINE

## 2024-05-21 PROCEDURE — 96366 THER/PROPH/DIAG IV INF ADDON: CPT

## 2024-05-21 RX ORDER — KETOROLAC TROMETHAMINE 30 MG/ML
15 INJECTION, SOLUTION INTRAMUSCULAR; INTRAVENOUS ONCE
Status: COMPLETED | OUTPATIENT
Start: 2024-05-21 | End: 2024-05-21

## 2024-05-21 RX ORDER — ONDANSETRON 2 MG/ML
4 INJECTION INTRAMUSCULAR; INTRAVENOUS ONCE
Status: COMPLETED | OUTPATIENT
Start: 2024-05-21 | End: 2024-05-21

## 2024-05-21 RX ORDER — METRONIDAZOLE 500 MG/1
500 TABLET ORAL ONCE
Status: COMPLETED | OUTPATIENT
Start: 2024-05-21 | End: 2024-05-21

## 2024-05-21 RX ORDER — ONDANSETRON 4 MG/1
4 TABLET, ORALLY DISINTEGRATING ORAL EVERY 8 HOURS PRN
Qty: 20 TABLET | Refills: 0 | Status: SHIPPED | OUTPATIENT
Start: 2024-05-21

## 2024-05-21 RX ORDER — DOXYCYCLINE HYCLATE 100 MG/1
100 CAPSULE ORAL 2 TIMES DAILY
Qty: 28 CAPSULE | Refills: 0 | Status: SHIPPED | OUTPATIENT
Start: 2024-05-21 | End: 2024-06-04

## 2024-05-21 RX ORDER — DOXYCYCLINE HYCLATE 100 MG/1
100 CAPSULE ORAL ONCE
Status: COMPLETED | OUTPATIENT
Start: 2024-05-21 | End: 2024-05-21

## 2024-05-21 RX ORDER — CEFTRIAXONE 1 G/50ML
1000 INJECTION, SOLUTION INTRAVENOUS ONCE
Status: COMPLETED | OUTPATIENT
Start: 2024-05-21 | End: 2024-05-21

## 2024-05-21 RX ORDER — METRONIDAZOLE 500 MG/1
500 TABLET ORAL EVERY 12 HOURS SCHEDULED
Qty: 28 TABLET | Refills: 0 | Status: SHIPPED | OUTPATIENT
Start: 2024-05-21 | End: 2024-06-04

## 2024-05-21 RX ADMIN — CEFTRIAXONE 1000 MG: 1 INJECTION, SOLUTION INTRAVENOUS at 23:20

## 2024-05-21 RX ADMIN — ONDANSETRON 4 MG: 2 INJECTION INTRAMUSCULAR; INTRAVENOUS at 21:47

## 2024-05-21 RX ADMIN — SODIUM CHLORIDE, SODIUM LACTATE, POTASSIUM CHLORIDE, AND CALCIUM CHLORIDE 1000 ML: .6; .31; .03; .02 INJECTION, SOLUTION INTRAVENOUS at 21:45

## 2024-05-21 RX ADMIN — DOXYCYCLINE HYCLATE 100 MG: 100 CAPSULE ORAL at 23:20

## 2024-05-21 RX ADMIN — METRONIDAZOLE 500 MG: 500 TABLET ORAL at 23:20

## 2024-05-21 RX ADMIN — KETOROLAC TROMETHAMINE 15 MG: 30 INJECTION, SOLUTION INTRAMUSCULAR at 21:47

## 2024-05-22 LAB
ALBUMIN SERPL BCP-MCNC: 4.9 G/DL (ref 3.5–5)
ALP SERPL-CCNC: 52 U/L (ref 34–104)
ALT SERPL W P-5'-P-CCNC: 13 U/L (ref 7–52)
ANION GAP SERPL CALCULATED.3IONS-SCNC: 14 MMOL/L (ref 4–13)
AST SERPL W P-5'-P-CCNC: 19 U/L (ref 13–39)
BILIRUB SERPL-MCNC: 0.43 MG/DL (ref 0.2–1)
BUN SERPL-MCNC: 15 MG/DL (ref 5–25)
C GLABRATA DNA VAG QL NAA+PROBE: NEGATIVE
C KRUSEI DNA VAG QL NAA+PROBE: NEGATIVE
C TRACH DNA SPEC QL NAA+PROBE: NEGATIVE
CALCIUM SERPL-MCNC: 9.9 MG/DL (ref 8.4–10.2)
CANDIDA SP 6 PNL VAG NAA+PROBE: NEGATIVE
CHLORIDE SERPL-SCNC: 107 MMOL/L (ref 96–108)
CO2 SERPL-SCNC: 21 MMOL/L (ref 21–32)
CREAT SERPL-MCNC: 0.85 MG/DL (ref 0.6–1.3)
GFR SERPL CREATININE-BSD FRML MDRD: 98 ML/MIN/1.73SQ M
GLUCOSE SERPL-MCNC: 69 MG/DL (ref 65–140)
N GONORRHOEA DNA SPEC QL NAA+PROBE: NEGATIVE
POTASSIUM SERPL-SCNC: 4.3 MMOL/L (ref 3.5–5.3)
PROT SERPL-MCNC: 8 G/DL (ref 6.4–8.4)
SODIUM SERPL-SCNC: 142 MMOL/L (ref 135–147)
T VAGINALIS DNA VAG QL NAA+PROBE: NEGATIVE
VAGINOSIS/ITIS DNA PNL VAG PROBE+SIG AMP: NEGATIVE

## 2024-05-22 NOTE — DISCHARGE INSTRUCTIONS
No sex until after you are evaluated by your gyn provider  Your partner needs to be check for sexually transmitted infections  Finish 2 weeks of antibiotics  With doxycycline do not take with calcium magnesium multivitamins daily as will stick to the medication and make it not work will also be more sensitive to the sun so wear sunscreen  Drinking for a period of 3 weeks as some people will throw up with taking the metronidazole and drinking alcohol  Zofran as needed for nausea  Return with fever worsening or changing abdominal pain inability keep fluids down soaking greater than 1 pad per hour or any new or worsening symptoms

## 2024-05-22 NOTE — ED PROVIDER NOTES
History  Chief Complaint   Patient presents with    Menstrual Problem     Spotting black for 3 days, have been on depo for over a year and normally have minimal to no spotting.      17 yo  presents with 3-day history of vaginal discharge she describes it as being thick and black.  It is with wiping and will appear on a panty liner.  She had no clots used for pantiliners today she is having lower pelvic pain just above the inguinal creases as well as vaginal pain.  She denies any back pain that is new she has chronic back pain she denies any fever or chills she is felt fatigued she had nausea and vomiting but no diarrhea or changes to her stools no dysuria or increased urinary frequency no trauma or fall she has not yet taken anything for the pain today she was a little lightheaded today denies any cough or upper respiratory complaints no chest pain or shortness of breath she is on Depo-Provera and is only had breakthrough bleeding occasion it is red and short-lived seems different.  Medical history asthma POTS back pain depression GERD past surgical history is tonsillectomy         Prior to Admission Medications   Prescriptions Last Dose Informant Patient Reported? Taking?   FLUoxetine (PROzac) 20 MG tablet   Yes No   Sig: Take 20 mg by mouth every morning   albuterol (PROVENTIL HFA,VENTOLIN HFA) 90 mcg/act inhaler  Self Yes No   Sig: Inhale 2 puffs every 6 (six) hours as needed for wheezing     Patient not taking: Reported on 3/22/2024   cephalexin (KEFLEX) 500 mg capsule  Self Yes No   Sig: Take 500 mg by mouth every 8 (eight) hours   Patient not taking: Reported on 3/22/2024   famotidine (PEPCID) 20 mg tablet  Self No No   Sig: TAKE 1 PO BID PRN for any GERD symptoms.   Patient not taking: Reported on 3/22/2024   medroxyPROGESTERone acetate (DEPO-PROVERA SYRINGE) 150 mg/mL injection  Self Yes No   Sig: Inject 150 mg into a muscle every 3 (three) months   ondansetron (ZOFRAN) 4 mg tablet   No No   Sig: Take  "1 tablet (4 mg total) by mouth every 8 (eight) hours as needed for nausea or vomiting   polyethylene glycol (GLYCOLAX) 17 GM/SCOOP powder  Self No No   Sig: Take 17 g by mouth 2 (two) times a day   sertraline (ZOLOFT) 50 mg tablet   Yes No   Sig: Take 50 mg by mouth daily   Patient not taking: Reported on 3/22/2024   traZODone (DESYREL) 50 mg tablet   Yes No   Sig: TAKE ONE OR TWO AT BEDTIME      Facility-Administered Medications: None       Past Medical History:   Diagnosis Date    Anxiety     Asthma     Back pain     Depression     GERD (gastroesophageal reflux disease)     POTS (postural orthostatic tachycardia syndrome)        Past Surgical History:   Procedure Laterality Date    TONSILLECTOMY      TONSILLECTOMY         Family History   Problem Relation Age of Onset    Bipolar disorder Mother     Scoliosis Mother     Allergy (severe) Sister     Kidney disease Brother     Breast cancer Maternal Aunt     Diabetes Maternal Grandmother      I have reviewed and agree with the history as documented.    E-Cigarette/Vaping    E-Cigarette Use Current Every Day User     Cartridges/Day \"1 cartridge for 2 weeks\".      E-Cigarette/Vaping Substances    Nicotine Yes     THC No     CBD No     Flavoring Yes     Other No     Unknown No      Social History     Tobacco Use    Smoking status: Former     Current packs/day: 0.25     Types: Cigarettes     Passive exposure: Current    Smokeless tobacco: Never    Tobacco comments:     one cigarette a day   Vaping Use    Vaping status: Every Day    Substances: Nicotine, Flavoring   Substance Use Topics    Alcohol use: Not Currently    Drug use: Not Currently     Comment: previopus use of meth.        Review of Systems   Constitutional:  Positive for activity change, appetite change and fatigue. Negative for chills and fever.   HENT:  Negative for congestion, ear pain, rhinorrhea, sneezing and sore throat.    Eyes:  Negative for discharge.   Respiratory:  Negative for cough and shortness " of breath.    Cardiovascular:  Negative for chest pain and leg swelling.   Gastrointestinal:  Positive for abdominal pain, nausea and vomiting. Negative for abdominal distention, blood in stool and diarrhea.   Endocrine: Negative for polyuria.   Genitourinary:  Positive for pelvic pain, vaginal discharge and vaginal pain. Negative for dysuria, frequency and urgency.   Musculoskeletal:  Negative for back pain and myalgias.   Skin:  Negative for rash.   Neurological:  Positive for light-headedness. Negative for dizziness, weakness, numbness and headaches.   Hematological:  Negative for adenopathy.   Psychiatric/Behavioral:  Negative for confusion.    All other systems reviewed and are negative.      Physical Exam  Physical Exam  Vitals and nursing note reviewed. Exam conducted with a chaperone present.   Constitutional:       General: She is not in acute distress.     Appearance: She is not ill-appearing, toxic-appearing or diaphoretic.      Comments: Texting on phone will smile   HENT:      Head: Normocephalic.      Right Ear: Tympanic membrane and external ear normal.      Left Ear: Tympanic membrane and external ear normal.      Nose: Nose normal. No congestion or rhinorrhea.      Mouth/Throat:      Mouth: Mucous membranes are moist.      Pharynx: No oropharyngeal exudate or posterior oropharyngeal erythema.   Eyes:      General:         Right eye: No discharge.         Left eye: No discharge.      Extraocular Movements: Extraocular movements intact.      Conjunctiva/sclera: Conjunctivae normal.      Pupils: Pupils are equal, round, and reactive to light.   Cardiovascular:      Rate and Rhythm: Normal rate and regular rhythm.      Pulses: Normal pulses.   Pulmonary:      Effort: Pulmonary effort is normal. No respiratory distress.      Breath sounds: Normal breath sounds. No stridor. No wheezing, rhonchi or rales.   Abdominal:      General: Bowel sounds are normal. There is no distension.      Palpations: Abdomen  is soft.      Tenderness: There is abdominal tenderness (RLQ bilateral  low quadrant mild). There is no right CVA tenderness, left CVA tenderness, guarding or rebound.   Genitourinary:     General: Normal vulva.      Comments: Chaparoned by Katie FONSECA.  Normal external Lizzette.  Patient has a greenish-tannish discharge from the cervix.  No evidence of any vaginal bleeding patient has diffuse tenderness to the adnexa bilaterally but no palpable adnexal masses.  She has mild cervical motion tenderness.  Musculoskeletal:         General: No swelling, tenderness, deformity or signs of injury. Normal range of motion.      Cervical back: Normal range of motion and neck supple. No rigidity or tenderness.      Right lower leg: No edema.      Left lower leg: No edema.   Lymphadenopathy:      Cervical: No cervical adenopathy.   Skin:     Capillary Refill: Capillary refill takes less than 2 seconds.   Neurological:      General: No focal deficit present.      Mental Status: She is alert and oriented to person, place, and time.      Cranial Nerves: No cranial nerve deficit.      Sensory: No sensory deficit.      Motor: No weakness.      Coordination: Coordination normal.      Gait: Gait normal.   Psychiatric:         Mood and Affect: Mood normal.         Vital Signs  ED Triage Vitals [05/21/24 2035]   Temperature Pulse Respirations Blood Pressure SpO2   (!) 97.3 °F (36.3 °C) 57 18 107/61 99 %      Temp Source Heart Rate Source Patient Position - Orthostatic VS BP Location FiO2 (%)   Tympanic Monitor Sitting Left arm --      Pain Score       4           Vitals:    05/21/24 2035   BP: 107/61   Pulse: 57   Patient Position - Orthostatic VS: Sitting         Visual Acuity      ED Medications  Medications   ondansetron (ZOFRAN) injection 4 mg (4 mg Intravenous Given 5/21/24 2147)   lactated ringers bolus 1,000 mL (0 mL Intravenous Stopped 5/21/24 2320)   ketorolac (TORADOL) injection 15 mg (15 mg Intravenous Given 5/21/24 2147)    cefTRIAXone (ROCEPHIN) IVPB (premix in dextrose) 1,000 mg 50 mL (1,000 mg Intravenous New Bag 5/21/24 2320)   metroNIDAZOLE (FLAGYL) tablet 500 mg (500 mg Oral Given 5/21/24 2320)   doxycycline hyclate (VIBRAMYCIN) capsule 100 mg (100 mg Oral Given 5/21/24 2320)       Diagnostic Studies  Results Reviewed       Procedure Component Value Units Date/Time    CBC and differential [[id number]]  (Abnormal) Collected: 05/21/24 2144    Lab Status: Final result Specimen: Blood from Arm, Left Updated: 05/21/24 2227     WBC 6.42 Thousand/uL      RBC 4.64 Million/uL      Hemoglobin 14.2 g/dL      Hematocrit 43.2 %      MCV 93 fL      MCH 30.6 pg      MCHC 32.9 g/dL      RDW 12.0 %      MPV 10.2 fL      Platelets 170 Thousands/uL      nRBC 0 /100 WBCs      Segmented % 40 %      Immature Grans % 0 %      Lymphocytes % 46 %      Monocytes % 7 %      Eosinophils Relative 6 %      Basophils Relative 1 %      Absolute Neutrophils 2.57 Thousands/µL      Absolute Immature Grans 0.01 Thousand/uL      Absolute Lymphocytes 3.01 Thousands/µL      Absolute Monocytes 0.43 Thousand/µL      Eosinophils Absolute 0.37 Thousand/µL      Basophils Absolute 0.03 Thousands/µL     Molecular Vaginal Panel [[id number]] Collected: 05/21/24 2144    Lab Status: In process Specimen: Genital from Vaginal Updated: 05/21/24 2224    Chlamydia/GC amplified DNA by PCR [[id number]] Collected: 05/21/24 2101    Lab Status: In process Updated: 05/21/24 2223    Comprehensive metabolic panel [[id number]] Collected: 05/21/24 2144    Lab Status: No result Specimen: Blood from Arm, Left     UA w Reflex to Microscopic w Reflex to Culture [[id number]] Collected: 05/21/24 2102    Lab Status: Final result Specimen: Urine, Clean Catch Updated: 05/21/24 2110     Color, UA Yellow     Clarity, UA Clear     Specific Gravity, UA >=1.030     pH, UA 5.5     Leukocytes, UA Negative     Nitrite, UA Negative     Protein, UA Negative mg/dl      Glucose, UA Negative mg/dl      Ketones, UA  "Negative mg/dl      Urobilinogen, UA <2.0 mg/dl      Bilirubin, UA Negative     Occult Blood, UA Negative    POCT pregnancy, urine [[id number]]  (Normal) Resulted: 05/21/24 2104    Lab Status: Final result Updated: 05/21/24 2104     EXT Preg Test, Ur Negative     Control Valid                   US pelvis complete w transvaginal   Final Result by Donn Flowers DO (05/21 2251)      Normal exam.                           Workstation performed: [id number]         US appendix   Final Result by Donn Flowers DO (05/21 2249)      Possible identification of the appendix in the right lower quadrant, which appears normal. However, evaluation is limited. No secondary signs of acute appendicitis. If there is further concern, CT can be offered.            Workstation performed: [id number]                    Procedures  Procedures         ED Course  ED Course as of 05/22/24 0005 Tue May 21, 2024   2317 Extensively reviewed ultrasound findings.  As well as patient's symptoms.  She did not symptoms are consistent with pelvic inflammatory disease recommended pelvic rest no sex until after she completes her course of antibiotic therapy and her partner is also checked recommend she follow-up with her [facility] provider Mariely Smith for further testing.  Reviewed did not test for all sexually transmitted infection she is tested for gonorrhea chlamydia and trichomonas the vaginal panel also contains test for bacterial vaginosis and Candida.  Further testing may be indicated she can discuss with her gyn         BRIAN      Flowsheet Row Most Recent Value   BRIAN Initial Screen: During the past 12 months, did you:    1. Drink any alcohol (more than a few sips)?  No Filed at: 05/21/2024 2040   2. Smoke any marijuana or hashish No Filed at: 05/21/2024 2040   3. Use anything else to get high? (\"anything else\" includes illegal drugs, over the counter and prescription drugs, and things that you sniff or 'cervantes')? No Filed at: 05/21/2024 2040      "                                       Medical Decision Making  Mdm:-18 year-old female has developed tachycardia discharge over the last 3 days with vaginal pain and concern for breakthrough bleeding on Depo-Provera.  History of trauma or fall she also has had some nausea and vomiting and right lower quadrant pain/tenderness [place] includes appendicitis PID; functional uterine bleeding less likely torsion she had symptomatic management with IV fluids Zofran and Toradol; recommend pelvic ultrasound to assess for tubo-ovarian abscess, torsion and ultrasound of the appendix.  We did discuss if the appendix is not clearly seen on ultrasound may need to proceed with CT abdomen pelvis to further assess if repeat exam is leaning in the direction of appendicitis. AT this time she is not demonstrating an acute abdomen.    Amount and/or Complexity of Data Reviewed  Labs: ordered.  Radiology: ordered.    Risk  Prescription drug management.             Disposition  Final diagnoses:   PID (acute pelvic inflammatory disease)     Time reflects when diagnosis was documented in both MDM as applicable and the Disposition within this note       Time User Action Codes Description Comment    5/21/2024 11:17 PM Kassandra Kothari Add [N73.0] PID (acute pelvic inflammatory disease)     5/21/2024 11:19 PM Kassandra Kothari Add [N73.9] Pelvic inflammatory disease     5/21/2024 11:23 PM Kassandra Kothari Remove [N73.9] Pelvic inflammatory disease           ED Disposition       ED Disposition   Discharge    Condition   Stable    Date/Time   Tue May 21, 2024 7330    Comment   Maranda Rodríguez discharge to home/self care.                   Follow-up Information       Follow up With Specialties Details Why Contact Info    follow up with your gyn provider call tomorrow    Kait Smith PAC  [facility] - [place] [address]   [address] [address] [phone]            Patient's Medications    Discharge Prescriptions    DOXYCYCLINE HYCLATE (VIBRAMYCIN) 100 MG CAPSULE    Take 1 capsule (100 mg total) by mouth 2 (two) times a day for 14 days       Start Date: 5/21/2024 End Date: 6/4/2024       Order Dose: 100 mg       Quantity: 28 capsule    Refills: 0    METRONIDAZOLE (FLAGYL) 500 MG TABLET    Take 1 tablet (500 mg total) by mouth every 12 (twelve) hours for 14 days       Start Date: 5/21/2024 End Date: 6/4/2024       Order Dose: 500 mg       Quantity: 28 tablet    Refills: 0    ONDANSETRON (ZOFRAN-ODT) 4 MG DISINTEGRATING TABLET    Take 1 tablet (4 mg total) by mouth every 8 (eight) hours as needed for nausea or vomiting for up to 20 doses       Start Date: 5/21/2024 End Date: --       Order Dose: 4 mg       Quantity: 20 tablet    Refills: 0       No discharge procedures on file.    PDMP Review       None            ED Provider  Electronically Signed by             Kassandra Kothari MD  05/22/24 0005

## 2024-05-29 ENCOUNTER — HOSPITAL ENCOUNTER (EMERGENCY)
Facility: HOSPITAL | Age: 19
Discharge: HOME | End: 2024-05-29
Attending: EMERGENCY MEDICINE
Payer: COMMERCIAL

## 2024-05-29 VITALS
SYSTOLIC BLOOD PRESSURE: 114 MMHG | HEART RATE: 82 BPM | DIASTOLIC BLOOD PRESSURE: 68 MMHG | OXYGEN SATURATION: 98 % | RESPIRATION RATE: 18 BRPM | TEMPERATURE: 98 F

## 2024-05-29 DIAGNOSIS — J02.9 ACUTE PHARYNGITIS, UNSPECIFIED ETIOLOGY: Primary | ICD-10-CM

## 2024-05-29 PROCEDURE — 99282 EMERGENCY DEPT VISIT SF MDM: CPT

## 2024-05-29 PROCEDURE — 99284 EMERGENCY DEPT VISIT MOD MDM: CPT | Performed by: EMERGENCY MEDICINE

## 2024-05-29 RX ORDER — IBUPROFEN 600 MG/1
600 TABLET ORAL ONCE
Status: COMPLETED | OUTPATIENT
Start: 2024-05-29 | End: 2024-05-29

## 2024-05-29 RX ADMIN — IBUPROFEN 600 MG: 600 TABLET, FILM COATED ORAL at 00:31

## 2024-05-29 RX ADMIN — DEXAMETHASONE SODIUM PHOSPHATE 10 MG: 10 INJECTION, SOLUTION INTRAMUSCULAR; INTRAVENOUS at 00:32

## 2024-05-29 NOTE — ED PROVIDER NOTES
History  Chief Complaint   Patient presents with    Sore Throat     Pt states that she started with a sore throat this morning. Denies any fevers       Maranda Rodríguez is a 18 y.o. year old female presenting to the [facility] ED for sore throat. Patient started with sore throat, cough and hoarse voice this afternoon.  No associated fevers or nasal congestion.  Sore throat is worsened when swallowing.  The patient episode of posttussive emesis.  No associated chest pain or dyspnea.  No abdominal pain.  Of note patient seen in the emergency department several days ago for vaginal bleeding at which time G/C, vaginal panel testing was negative and patient treated with ceftriaxone and doxycycline. Patient has taken tylenol at home for symptomatic treatment.      History provided by:  Medical records and patient   used: No    Sore Throat  Associated symptoms: cough, trouble swallowing and voice change    Associated symptoms: no abdominal pain, no chest pain, no fever, no headaches, no neck stiffness and no shortness of breath        Prior to Admission Medications   Prescriptions Last Dose Informant Patient Reported? Taking?   FLUoxetine (PROzac) 20 MG tablet   Yes No   Sig: Take 20 mg by mouth every morning   albuterol (PROVENTIL HFA,VENTOLIN HFA) 90 mcg/act inhaler  Self Yes No   Sig: Inhale 2 puffs every 6 (six) hours as needed for wheezing     Patient not taking: Reported on 3/22/2024   cephalexin (KEFLEX) 500 mg capsule  Self Yes No   Sig: Take 500 mg by mouth every 8 (eight) hours   Patient not taking: Reported on 3/22/2024   doxycycline hyclate (VIBRAMYCIN) 100 mg capsule   No No   Sig: Take 1 capsule (100 mg total) by mouth 2 (two) times a day for 14 days   famotidine (PEPCID) 20 mg tablet  Self No No   Sig: TAKE 1 PO BID PRN for any GERD symptoms.   Patient not taking: Reported on 3/22/2024   medroxyPROGESTERone acetate (DEPO-PROVERA SYRINGE) 150 mg/mL injection  Self Yes No   Sig: Inject 150 mg  "into a muscle every 3 (three) months   metroNIDAZOLE (FLAGYL) 500 mg tablet   No No   Sig: Take 1 tablet (500 mg total) by mouth every 12 (twelve) hours for 14 days   ondansetron (ZOFRAN) 4 mg tablet   No No   Sig: Take 1 tablet (4 mg total) by mouth every 8 (eight) hours as needed for nausea or vomiting   ondansetron (ZOFRAN-ODT) 4 mg disintegrating tablet   No No   Sig: Take 1 tablet (4 mg total) by mouth every 8 (eight) hours as needed for nausea or vomiting for up to 20 doses   polyethylene glycol (GLYCOLAX) 17 GM/SCOOP powder  Self No No   Sig: Take 17 g by mouth 2 (two) times a day   sertraline (ZOLOFT) 50 mg tablet   Yes No   Sig: Take 50 mg by mouth daily   Patient not taking: Reported on 3/22/2024   traZODone (DESYREL) 50 mg tablet   Yes No   Sig: TAKE ONE OR TWO AT BEDTIME      Facility-Administered Medications: None       Past Medical History:   Diagnosis Date    Anxiety     Asthma     Back pain     Depression     GERD (gastroesophageal reflux disease)     POTS (postural orthostatic tachycardia syndrome)        Past Surgical History:   Procedure Laterality Date    TONSILLECTOMY      TONSILLECTOMY         Family History   Problem Relation Age of Onset    Bipolar disorder Mother     Scoliosis Mother     Allergy (severe) Sister     Kidney disease Brother     Breast cancer Maternal Aunt     Diabetes Maternal Grandmother      I have reviewed and agree with the history as documented.    E-Cigarette/Vaping    E-Cigarette Use Current Every Day User     Cartridges/Day \"1 cartridge for 2 weeks\".      E-Cigarette/Vaping Substances    Nicotine Yes     THC No     CBD No     Flavoring Yes     Other No     Unknown No      Social History     Tobacco Use    Smoking status: Former     Current packs/day: 0.25     Types: Cigarettes     Passive exposure: Current    Smokeless tobacco: Never    Tobacco comments:     one cigarette a day   Vaping Use    Vaping status: Every Day    Substances: Nicotine, Flavoring   Substance Use " Topics    Alcohol use: Not Currently    Drug use: Not Currently     Comment: previopus use of meth.        Review of Systems   Constitutional:  Negative for fever.   HENT:  Positive for congestion, sore throat, trouble swallowing and voice change.    Respiratory:  Positive for cough. Negative for shortness of breath.    Cardiovascular:  Negative for chest pain.   Gastrointestinal:  Positive for nausea and vomiting. Negative for abdominal pain.   Musculoskeletal:  Negative for neck pain and neck stiffness.   Neurological:  Negative for headaches.   All other systems reviewed and are negative.      Physical Exam  Physical Exam  Vitals and nursing note reviewed.   Constitutional:       General: She is not in acute distress.     Appearance: Normal appearance. She is well-developed. She is not ill-appearing, toxic-appearing or diaphoretic.   HENT:      Head: Normocephalic and atraumatic.      Nose: No congestion or rhinorrhea.      Mouth/Throat:      Pharynx: Uvula midline. Posterior oropharyngeal erythema present. No pharyngeal swelling, oropharyngeal exudate or uvula swelling.      Tonsils: No tonsillar exudate or tonsillar abscesses.   Eyes:      General:         Right eye: No discharge.         Left eye: No discharge.   Neck:      Trachea: Phonation normal.   Cardiovascular:      Rate and Rhythm: Normal rate and regular rhythm.   Pulmonary:      Effort: Pulmonary effort is normal. No accessory muscle usage or respiratory distress.      Breath sounds: Normal breath sounds. No stridor. No decreased breath sounds, wheezing, rhonchi or rales.   Abdominal:      General: Bowel sounds are normal. There is no distension.      Palpations: Abdomen is soft.      Tenderness: There is no abdominal tenderness. There is no guarding or rebound.   Musculoskeletal:      Cervical back: Normal range of motion and neck supple. No edema, erythema, rigidity or crepitus. No pain with movement.      Right lower leg: No tenderness. No edema.  "     Left lower leg: No tenderness. No edema.   Skin:     Capillary Refill: Capillary refill takes less than 2 seconds.      Findings: No rash.   Neurological:      Mental Status: She is alert and oriented to person, place, and time.   Psychiatric:         Mood and Affect: Mood normal.         Behavior: Behavior normal.         Vital Signs  ED Triage Vitals   Temperature Pulse Respirations Blood Pressure SpO2   05/29/24 0007 05/29/24 0007 05/29/24 0007 05/29/24 0007 05/29/24 0007   98 °F (36.7 °C) 82 18 114/68 98 %      Temp Source Heart Rate Source Patient Position - Orthostatic VS BP Location FiO2 (%)   05/29/24 0007 05/29/24 0007 05/29/24 0007 05/29/24 0007 --   Temporal Monitor Sitting Right arm       Pain Score       05/29/24 0031       3           Vitals:    05/29/24 0007   BP: 114/68   Pulse: 82   Patient Position - Orthostatic VS: Sitting         Visual Acuity      ED Medications  Medications   dexamethasone oral liquid 10 mg 1 mL (10 mg Oral Given 5/29/24 0032)   ibuprofen (MOTRIN) tablet 600 mg (600 mg Oral Given 5/29/24 0031)       Diagnostic Studies  Results Reviewed       None                   No orders to display              Procedures  Procedures         ED Course         CRAFFT      Flowsheet Row Most Recent Value   CRAFFT Initial Screen: During the past 12 months, did you:    1. Drink any alcohol (more than a few sips)?  No Filed at: 05/29/2024 0009   2. Smoke any marijuana or hashish No Filed at: 05/29/2024 0009   3. Use anything else to get high? (\"anything else\" includes illegal drugs, over the counter and prescription drugs, and things that you sniff or 'cervantes')? No Filed at: 05/29/2024 0009                                            Medical Decision Making    18 y.o. female presenting for sore throat.  VSS, nontoxic appearing.  No s/s of neck-space abscess on exam.  Given absence of fevers, no tonsillar exudate and presence of cough do not suspect strep pharyngitis at this time.  Symptoms more " suggestive of viral URI.  Recent G/C negative and no exudate therefore do not suspect gonococcal pharyngitis.  Will recommend supportive care. Patient/mother agreeable to forgo testing at this time.    I have discussed with the patient our plan to discharge them from the ED and the patient is in agreement with this plan. The patient was provided a written after visit summary with strict RTED precautions.     Discharge Plan: Encourage OTC motrin and continue tylenol for symptoms.    Followup: I have discussed with the patient plan to follow up with their PCP. Contact information provided in AVS.    Risk  Prescription drug management.             Disposition  Final diagnoses:   Acute pharyngitis, unspecified etiology     Time reflects when diagnosis was documented in both MDM as applicable and the Disposition within this note       Time User Action Codes Description Comment    5/29/2024 12:25 AM Mayur Block Add [J02.9] Acute pharyngitis, unspecified etiology           ED Disposition       ED Disposition   Discharge    Condition   Stable    Date/Time   Wed May 29, 2024 0025    Comment   Maranda Rodríguez discharge to home/self care.                   Follow-up Information       Follow up With Specialties Details Why Contact Info    Venkatesh Duong MD  Schedule an appointment as soon as possible for a visit  For reevaluation if symptoms do not resolve. [phone]   [address] [place] [address]            Discharge Medication List as of 5/29/2024 12:26 AM        CONTINUE these medications which have NOT CHANGED    Details   albuterol (PROVENTIL HFA,VENTOLIN HFA) 90 mcg/act inhaler Inhale 2 puffs every 6 (six) hours as needed for wheezing  , Historical Med      cephalexin (KEFLEX) 500 mg capsule Take 500 mg by mouth every 8 (eight) hours, Historical Med      doxycycline hyclate (VIBRAMYCIN) 100 mg capsule Take 1 capsule (100 mg total) by mouth 2 (two) times a day for 14 days, Starting Tue 5/21/2024, Until Tue  6/4/2024, Normal      famotidine (PEPCID) 20 mg tablet TAKE 1 PO BID PRN for any GERD symptoms., Normal      FLUoxetine (PROzac) 20 MG tablet Take 20 mg by mouth every morning, Starting Mon 2/12/2024, Historical Med      medroxyPROGESTERone acetate (DEPO-PROVERA SYRINGE) 150 mg/mL injection Inject 150 mg into a muscle every 3 (three) months, Historical Med      metroNIDAZOLE (FLAGYL) 500 mg tablet Take 1 tablet (500 mg total) by mouth every 12 (twelve) hours for 14 days, Starting Tue 5/21/2024, Until Tue 6/4/2024, Normal      ondansetron (ZOFRAN) 4 mg tablet Take 1 tablet (4 mg total) by mouth every 8 (eight) hours as needed for nausea or vomiting, Starting Thu 2/29/2024, Normal      ondansetron (ZOFRAN-ODT) 4 mg disintegrating tablet Take 1 tablet (4 mg total) by mouth every 8 (eight) hours as needed for nausea or vomiting for up to 20 doses, Starting Tue 5/21/2024, Normal      polyethylene glycol (GLYCOLAX) 17 GM/SCOOP powder Take 17 g by mouth 2 (two) times a day, Starting Thu 1/4/2024, Normal      sertraline (ZOLOFT) 50 mg tablet Take 50 mg by mouth daily, Starting Mon 1/15/2024, Historical Med      traZODone (DESYREL) 50 mg tablet TAKE ONE OR TWO AT BEDTIME, Historical Med             No discharge procedures on file.    PDMP Review       None            ED Provider  Electronically Signed by             Mayur Block DO  05/29/24 7411

## 2024-05-29 NOTE — DISCHARGE INSTRUCTIONS
You have been seen for pharyngitis. Please take tylenol and motrin for your symptoms. Return to the emergency department if you develop worsening sore throat, trouble breathing, fevers or any other symptoms of concern. Please follow up with your PCP by calling the number provided.

## 2024-05-30 ENCOUNTER — OFFICE VISIT (OUTPATIENT)
Dept: URGENT CARE | Facility: CLINIC | Age: 19
End: 2024-05-30
Payer: COMMERCIAL

## 2024-05-30 VITALS
DIASTOLIC BLOOD PRESSURE: 62 MMHG | SYSTOLIC BLOOD PRESSURE: 108 MMHG | TEMPERATURE: 98.1 F | RESPIRATION RATE: 18 BRPM | BODY MASS INDEX: 25.32 KG/M2 | OXYGEN SATURATION: 98 % | HEART RATE: 66 BPM | WEIGHT: 136.2 LBS

## 2024-05-30 DIAGNOSIS — J06.9 URI WITH COUGH AND CONGESTION: Primary | ICD-10-CM

## 2024-05-30 PROCEDURE — 99213 OFFICE O/P EST LOW 20 MIN: CPT | Performed by: NURSE PRACTITIONER

## 2024-05-30 NOTE — PATIENT INSTRUCTIONS
You have a viral cold. You are to take dayquil for your symptoms.  Follow up with your PCP in 3-5 days  Go to the ED if symptoms worsen

## 2024-05-30 NOTE — PROGRESS NOTES
"  [facility]        NAME: Maranda Rodríguez is a 18 y.o. female  : 2005    MRN: [id number]  DATE: May 30, 2024  TIME: 3:18 PM    Assessment and Plan   URI with cough and congestion [J06.9]  1. URI with cough and congestion              Patient Instructions       Follow up with PCP in 3-5 days.  Proceed to  ER if symptoms worsen.    If tests have been performed at [facility], our office will contact you with results if changes need to be made to the care plan discussed with you at the visit.  You can review your full results on [facility]'s MyChart.    You have a viral cold. You are to take dayquil for your symptoms.  Follow up with your PCP in 3-5 days  Go to the ED if symptoms worsen         Chief Complaint     Chief Complaint   Patient presents with    Cough    Nasal Congestion         History of Present Illness       This is a 18 year old female who states was in the ED yesterday due to sorethroat and being unable to fully swallow. She states she was better when she left and this morning had runny nose, cough, congestion. She states she took an OTC congestion medication and states her nose was \"pouring\".  She does vape. Denies fevers, chills, n/v/d.  She states she has a birthday party to attend on Saturday and \"wanted to make sure she has nothing contagious\".  She denies covid testing.  PMH is listed.     Cough  Associated symptoms include rhinorrhea.       Review of Systems   Review of Systems   Constitutional: Negative.    HENT:  Positive for congestion and rhinorrhea.    Eyes: Negative.    Respiratory:  Positive for cough.    Cardiovascular: Negative.    Gastrointestinal: Negative.    Endocrine: Negative.    Genitourinary: Negative.    Musculoskeletal: Negative.    Skin: Negative.    Allergic/Immunologic: Negative.    Neurological: Negative.    Hematological: Negative.    Psychiatric/Behavioral: Negative.           Current Medications       Current Outpatient Medications:     FLUoxetine (PROzac) " 20 MG tablet, Take 20 mg by mouth every morning, Disp: , Rfl:     medroxyPROGESTERone acetate (DEPO-PROVERA SYRINGE) 150 mg/mL injection, Inject 150 mg into a muscle every 3 (three) months, Disp: , Rfl:     traZODone (DESYREL) 50 mg tablet, TAKE ONE OR TWO AT BEDTIME, Disp: , Rfl:     albuterol (PROVENTIL HFA,VENTOLIN HFA) 90 mcg/act inhaler, Inhale 2 puffs every 6 (six) hours as needed for wheezing   (Patient not taking: Reported on 3/22/2024), Disp: , Rfl:     cephalexin (KEFLEX) 500 mg capsule, Take 500 mg by mouth every 8 (eight) hours (Patient not taking: Reported on 3/22/2024), Disp: , Rfl:     doxycycline hyclate (VIBRAMYCIN) 100 mg capsule, Take 1 capsule (100 mg total) by mouth 2 (two) times a day for 14 days, Disp: 28 capsule, Rfl: 0    famotidine (PEPCID) 20 mg tablet, TAKE 1 PO BID PRN for any GERD symptoms. (Patient not taking: Reported on 3/22/2024), Disp: 90 tablet, Rfl: 2    metroNIDAZOLE (FLAGYL) 500 mg tablet, Take 1 tablet (500 mg total) by mouth every 12 (twelve) hours for 14 days, Disp: 28 tablet, Rfl: 0    ondansetron (ZOFRAN) 4 mg tablet, Take 1 tablet (4 mg total) by mouth every 8 (eight) hours as needed for nausea or vomiting, Disp: 20 tablet, Rfl: 0    ondansetron (ZOFRAN-ODT) 4 mg disintegrating tablet, Take 1 tablet (4 mg total) by mouth every 8 (eight) hours as needed for nausea or vomiting for up to 20 doses, Disp: 20 tablet, Rfl: 0    polyethylene glycol (GLYCOLAX) 17 GM/SCOOP powder, Take 17 g by mouth 2 (two) times a day, Disp: 578 g, Rfl: 10    sertraline (ZOLOFT) 50 mg tablet, Take 50 mg by mouth daily (Patient not taking: Reported on 3/22/2024), Disp: , Rfl:     Current Allergies     Allergies as of 05/30/2024 - Reviewed 05/30/2024   Allergen Reaction Noted    Doxycycline Rash 05/30/2024    Latex Rash 04/12/2019            The following portions of the patient's history were reviewed and updated as appropriate: allergies, current medications, past family history, past medical  history, past social history, past surgical history and problem list.     Past Medical History:   Diagnosis Date    Anxiety     Asthma     Back pain     Depression     GERD (gastroesophageal reflux disease)     POTS (postural orthostatic tachycardia syndrome)        Past Surgical History:   Procedure Laterality Date    TONSILLECTOMY      TONSILLECTOMY         Family History   Problem Relation Age of Onset    Bipolar disorder Mother     Scoliosis Mother     Allergy (severe) Sister     Kidney disease Brother     Breast cancer Maternal Aunt     Diabetes Maternal Grandmother          Medications have been verified.        Objective   /62   Pulse 66   Temp 98.1 °F (36.7 °C)   Resp 18   Wt 61.8 kg (136 lb 3.2 oz)   SpO2 98%   BMI 25.32 kg/m²   No LMP recorded. Patient has had an injection.       Physical Exam     Physical Exam  Vitals and nursing note reviewed.   Constitutional:       General: She is not in acute distress.     Appearance: Normal appearance. She is normal weight. She is not ill-appearing, toxic-appearing or diaphoretic.   HENT:      Head: Normocephalic and atraumatic.      Right Ear: Tympanic membrane and ear canal normal.      Left Ear: Tympanic membrane and ear canal normal.      Nose: Congestion and rhinorrhea present.      Mouth/Throat:      Mouth: Mucous membranes are moist.      Pharynx: No oropharyngeal exudate or posterior oropharyngeal erythema.   Eyes:      Extraocular Movements: Extraocular movements intact.   Cardiovascular:      Rate and Rhythm: Normal rate and regular rhythm.      Pulses: Normal pulses.      Heart sounds: Normal heart sounds. No murmur heard.  Pulmonary:      Effort: Pulmonary effort is normal. No respiratory distress.      Breath sounds: Normal breath sounds. No stridor. No wheezing, rhonchi or rales.   Chest:      Chest wall: No tenderness.   Musculoskeletal:         General: Normal range of motion.      Cervical back: Normal range of motion and neck supple.    Skin:     General: Skin is warm and dry.      Capillary Refill: Capillary refill takes less than 2 seconds.   Neurological:      General: No focal deficit present.      Mental Status: She is alert and oriented to person, place, and time.   Psychiatric:         Mood and Affect: Mood normal.         Behavior: Behavior normal.         Thought Content: Thought content normal.         Judgment: Judgment normal.

## 2024-08-26 ENCOUNTER — OFFICE VISIT (OUTPATIENT)
Dept: URGENT CARE | Facility: CLINIC | Age: 19
End: 2024-08-26
Payer: COMMERCIAL

## 2024-08-26 VITALS
RESPIRATION RATE: 18 BRPM | SYSTOLIC BLOOD PRESSURE: 120 MMHG | HEART RATE: 61 BPM | OXYGEN SATURATION: 98 % | TEMPERATURE: 98.6 F | DIASTOLIC BLOOD PRESSURE: 68 MMHG

## 2024-08-26 DIAGNOSIS — K04.7 DENTAL ABSCESS: Primary | ICD-10-CM

## 2024-08-26 PROCEDURE — 99213 OFFICE O/P EST LOW 20 MIN: CPT | Performed by: PHYSICIAN ASSISTANT

## 2024-08-26 RX ORDER — AMOXICILLIN 875 MG
875 TABLET ORAL 2 TIMES DAILY
Qty: 14 TABLET | Refills: 0 | Status: SHIPPED | OUTPATIENT
Start: 2024-08-26 | End: 2024-09-02

## 2024-08-26 RX ORDER — IBUPROFEN 800 MG/1
800 TABLET, FILM COATED ORAL EVERY 8 HOURS PRN
Qty: 30 TABLET | Refills: 0 | Status: SHIPPED | OUTPATIENT
Start: 2024-08-26

## 2024-08-26 NOTE — PROGRESS NOTES
[facility]        NAME: Maranda Rodríguez is a 19 y.o. female  : 2005    MRN: [id number]  DATE: 2024  TIME: 6:50 PM    Assessment and Plan   Dental abscess [K04.7]  1. Dental abscess  amoxicillin (AMOXIL) 875 mg tablet    ibuprofen (MOTRIN) 800 mg tablet            Patient Instructions       Follow up with PCP in 3-5 days.  Proceed to  ER if symptoms worsen.    If tests have been performed at [facility], our office will contact you with results if changes need to be made to the care plan discussed with you at the visit.  You can review your full results on [facility].    Chief Complaint     Chief Complaint   Patient presents with    Jaw Pain     Started last night, started on left, but now on right, wisdom teeth coming out Wednesday, hard to open mouth, painful          History of Present Illness       Patient is a 18 y/o/f c/o left lower molar/jaw pain and swelling.  Patient is scheduled to have wisdom teeth removed in two days.  Increase in pain and swelling most noted to left lower jawline.  Patient has difficulty opening due to pain.  Patient w/ decrease PO intake 2/2 pain and swelling.          Review of Systems   Review of Systems   Constitutional:  Negative for chills and fever.   HENT:  Positive for dental problem. Negative for ear pain and sore throat.    Eyes:  Negative for pain and visual disturbance.   Respiratory:  Negative for cough and shortness of breath.    Cardiovascular:  Negative for chest pain and palpitations.   Gastrointestinal:  Negative for abdominal pain and vomiting.   Genitourinary:  Negative for dysuria and hematuria.   Musculoskeletal:  Negative for arthralgias and back pain.   Skin:  Negative for color change and rash.   Neurological:  Negative for seizures and syncope.   All other systems reviewed and are negative.        Current Medications       Current Outpatient Medications:     amoxicillin (AMOXIL) 875 mg tablet, Take 1 tablet (875 mg total) by  mouth 2 (two) times a day for 7 days, Disp: 14 tablet, Rfl: 0    FLUoxetine (PROzac) 20 MG tablet, Take 20 mg by mouth every morning, Disp: , Rfl:     ibuprofen (MOTRIN) 800 mg tablet, Take 1 tablet (800 mg total) by mouth every 8 (eight) hours as needed for mild pain, Disp: 30 tablet, Rfl: 0    ondansetron (ZOFRAN) 4 mg tablet, Take 1 tablet (4 mg total) by mouth every 8 (eight) hours as needed for nausea or vomiting, Disp: 20 tablet, Rfl: 0    polyethylene glycol (GLYCOLAX) 17 GM/SCOOP powder, Take 17 g by mouth 2 (two) times a day, Disp: 578 g, Rfl: 10    traZODone (DESYREL) 50 mg tablet, TAKE ONE OR TWO AT BEDTIME, Disp: , Rfl:     albuterol (PROVENTIL HFA,VENTOLIN HFA) 90 mcg/act inhaler, Inhale 2 puffs every 6 (six) hours as needed for wheezing   (Patient not taking: Reported on 3/22/2024), Disp: , Rfl:     cephalexin (KEFLEX) 500 mg capsule, Take 500 mg by mouth every 8 (eight) hours (Patient not taking: Reported on 3/22/2024), Disp: , Rfl:     famotidine (PEPCID) 20 mg tablet, TAKE 1 PO BID PRN for any GERD symptoms. (Patient not taking: Reported on 3/22/2024), Disp: 90 tablet, Rfl: 2    medroxyPROGESTERone acetate (DEPO-PROVERA SYRINGE) 150 mg/mL injection, Inject 150 mg into a muscle every 3 (three) months (Patient not taking: Reported on 8/26/2024), Disp: , Rfl:     ondansetron (ZOFRAN-ODT) 4 mg disintegrating tablet, Take 1 tablet (4 mg total) by mouth every 8 (eight) hours as needed for nausea or vomiting for up to 20 doses, Disp: 20 tablet, Rfl: 0    Current Allergies     Allergies as of 08/26/2024 - Reviewed 08/26/2024   Allergen Reaction Noted    Doxycycline Rash 05/30/2024    Latex Rash 04/12/2019            The following portions of the patient's history were reviewed and updated as appropriate: allergies, current medications, past family history, past medical history, past social history, past surgical history and problem list.     Past Medical History:   Diagnosis Date    Anxiety     Asthma      Back pain     Depression     GERD (gastroesophageal reflux disease)     POTS (postural orthostatic tachycardia syndrome)        Past Surgical History:   Procedure Laterality Date    TONSILLECTOMY      TONSILLECTOMY         Family History   Problem Relation Age of Onset    Bipolar disorder Mother     Scoliosis Mother     Allergy (severe) Sister     Kidney disease Brother     Breast cancer Maternal Aunt     Diabetes Maternal Grandmother          Medications have been verified.        Objective   /68   Pulse 61   Temp 98.6 °F (37 °C)   Resp 18   SpO2 98%   No LMP recorded.       Physical Exam     Physical Exam  Constitutional:       Appearance: Normal appearance.   HENT:      Head: Normocephalic and atraumatic.      Nose: Nose normal.      Mouth/Throat:      Mouth: Mucous membranes are moist.      Dentition: Abnormal dentition. Dental tenderness and dental abscesses present.   Eyes:      Extraocular Movements: Extraocular movements intact.      Conjunctiva/sclera: Conjunctivae normal.      Pupils: Pupils are equal, round, and reactive to light.   Cardiovascular:      Rate and Rhythm: Normal rate.   Pulmonary:      Effort: Pulmonary effort is normal.   Musculoskeletal:         General: Normal range of motion.      Cervical back: Normal range of motion and neck supple.   Skin:     General: Skin is warm and dry.      Capillary Refill: Capillary refill takes less than 2 seconds.   Neurological:      General: No focal deficit present.      Mental Status: She is alert and oriented to person, place, and time.   Psychiatric:         Mood and Affect: Mood normal.         Behavior: Behavior normal.

## 2024-10-14 ENCOUNTER — OFFICE VISIT (OUTPATIENT)
Dept: OBGYN CLINIC | Facility: CLINIC | Age: 19
End: 2024-10-14
Payer: COMMERCIAL

## 2024-10-14 ENCOUNTER — APPOINTMENT (OUTPATIENT)
Dept: RADIOLOGY | Facility: CLINIC | Age: 19
End: 2024-10-14
Payer: COMMERCIAL

## 2024-10-14 VITALS
HEART RATE: 76 BPM | DIASTOLIC BLOOD PRESSURE: 70 MMHG | SYSTOLIC BLOOD PRESSURE: 110 MMHG | WEIGHT: 136 LBS | BODY MASS INDEX: 25.03 KG/M2 | HEIGHT: 62 IN

## 2024-10-14 DIAGNOSIS — M25.561 RIGHT KNEE PAIN, UNSPECIFIED CHRONICITY: ICD-10-CM

## 2024-10-14 DIAGNOSIS — Z01.89 ENCOUNTER FOR LOWER EXTREMITY COMPARISON IMAGING STUDY: ICD-10-CM

## 2024-10-14 DIAGNOSIS — M25.561 RIGHT KNEE PAIN, UNSPECIFIED CHRONICITY: Primary | ICD-10-CM

## 2024-10-14 DIAGNOSIS — M25.561 PATELLOFEMORAL ARTHRALGIA OF RIGHT KNEE: ICD-10-CM

## 2024-10-14 PROCEDURE — 99214 OFFICE O/P EST MOD 30 MIN: CPT | Performed by: STUDENT IN AN ORGANIZED HEALTH CARE EDUCATION/TRAINING PROGRAM

## 2024-10-14 PROCEDURE — 73560 X-RAY EXAM OF KNEE 1 OR 2: CPT

## 2024-10-14 PROCEDURE — 73562 X-RAY EXAM OF KNEE 3: CPT

## 2024-10-14 RX ORDER — MELOXICAM 15 MG/1
15 TABLET ORAL DAILY
Qty: 30 TABLET | Refills: 1 | Status: SHIPPED | OUTPATIENT
Start: 2024-10-14

## 2024-10-14 NOTE — PROGRESS NOTES
"ASSESSMENT/PLAN:    Diagnoses and all orders for this visit:    Right knee pain, unspecified chronicity  -     XR knee 3 vw right non injury; Future    Patellofemoral arthralgia of right knee  -     Ambulatory Referral to Physical Therapy; Future  -     meloxicam (Mobic) 15 mg tablet; Take 1 tablet (15 mg total) by mouth daily    Encounter for lower extremity comparison imaging study  -     XR knee 1 or 2 vw left; Future    Pathophysiology was reviewed with the patient.  She will be started on Mobic with precautions with the Prozac which she reports she is not taking at current.  She is in the process of getting a new psychiatric treatment facility.  May continue to ice.  Provided formal prescription for physical therapy to work on hip and knee range of motion 8 and patellofemoral tracking and strengthening.  Patient will be seen back in 6 weeks in the orthopedic office for repeat evaluation.  She should stop the use of the ibuprofen while on the Mobic.    Return in about 6 weeks (around 11/25/2024).      _____________________________________________________  CHIEF COMPLAINT:  Chief Complaint   Patient presents with    Right Knee - Pain     SUBJECTIVE:  Maranda Rodríguez is a 19 y.o. year old female who presents for follow up of right knee pain.  She has seen Dr. De Anda on multiple occasions in the past with previous x-rays and MRI all within normal limits.  She reports intermittent popping and grinding which continues and intermittent swelling.  Also intermittent warmth including after icing, \"so maybe I can't ice it anymore.    PAST MEDICAL HISTORY:  Past Medical History:   Diagnosis Date    Anxiety     Asthma     Back pain     Depression     GERD (gastroesophageal reflux disease)     POTS (postural orthostatic tachycardia syndrome)      PAST SURGICAL HISTORY:  Past Surgical History:   Procedure Laterality Date    TONSILLECTOMY      TONSILLECTOMY       FAMILY HISTORY:  Family History   Problem Relation Age of Onset "    Bipolar disorder Mother     Scoliosis Mother     Allergy (severe) Sister     Kidney disease Brother     Breast cancer Maternal Aunt     Diabetes Maternal Grandmother      SOCIAL HISTORY:  Social History     Tobacco Use    Smoking status: Former     Current packs/day: 0.25     Types: Cigarettes     Passive exposure: Current    Smokeless tobacco: Never    Tobacco comments:     one cigarette a day   Vaping Use    Vaping status: Every Day    Substances: Nicotine, Flavoring   Substance Use Topics    Alcohol use: Not Currently    Drug use: Not Currently     Comment: previopus use of meth.      MEDICATIONS:    Current Outpatient Medications:     FLUoxetine (PROzac) 20 MG tablet, Take 20 mg by mouth every morning, Disp: , Rfl:     ibuprofen (MOTRIN) 800 mg tablet, Take 1 tablet (800 mg total) by mouth every 8 (eight) hours as needed for mild pain, Disp: 30 tablet, Rfl: 0    meloxicam (Mobic) 15 mg tablet, Take 1 tablet (15 mg total) by mouth daily, Disp: 30 tablet, Rfl: 1    ondansetron (ZOFRAN) 4 mg tablet, Take 1 tablet (4 mg total) by mouth every 8 (eight) hours as needed for nausea or vomiting, Disp: 20 tablet, Rfl: 0    ondansetron (ZOFRAN-ODT) 4 mg disintegrating tablet, Take 1 tablet (4 mg total) by mouth every 8 (eight) hours as needed for nausea or vomiting for up to 20 doses, Disp: 20 tablet, Rfl: 0    albuterol (PROVENTIL HFA,VENTOLIN HFA) 90 mcg/act inhaler, Inhale 2 puffs every 6 (six) hours as needed for wheezing   (Patient not taking: Reported on 3/22/2024), Disp: , Rfl:     cephalexin (KEFLEX) 500 mg capsule, Take 500 mg by mouth every 8 (eight) hours (Patient not taking: Reported on 3/22/2024), Disp: , Rfl:     famotidine (PEPCID) 20 mg tablet, TAKE 1 PO BID PRN for any GERD symptoms. (Patient not taking: Reported on 3/22/2024), Disp: 90 tablet, Rfl: 2    medroxyPROGESTERone acetate (DEPO-PROVERA SYRINGE) 150 mg/mL injection, Inject 150 mg into a muscle every 3 (three) months (Patient not taking:  Reported on 8/26/2024), Disp: , Rfl:     polyethylene glycol (GLYCOLAX) 17 GM/SCOOP powder, Take 17 g by mouth 2 (two) times a day, Disp: 578 g, Rfl: 10    traZODone (DESYREL) 50 mg tablet, TAKE ONE OR TWO AT BEDTIME, Disp: , Rfl:     ALLERGIES:  Allergies   Allergen Reactions    Doxycycline Rash    Latex Rash     REVIEW OF SYSTEMS:  Pertinent items are noted in HPI.  A comprehensive review of systems was negative.  _____________________________________________________  PHYSICAL EXAMINATION:  General: well developed and well nourished, alert, oriented times 3, and appears comfortable  Psychiatric: Normal  HEENT:  Normocephalic, atraumatic  Cardiovascular:  Regular  Pulmonary: No wheezing or stridor  Skin: No masses, erthema, lacerations, fluctation, ulcerations  Neurovascular: intact  MUSCULOSKELETAL EXAMINATION:    Right knee is without erythema nor effusion.  Seems to track fairly midline.  She reports tenderness to palpation lateral retinaculum as well as the hamstring tendons and patellar tendon.  No discrete joint line tenderness.  No meniscal findings on exam no ligamentous laxity.  No tenderness to palpation over the pes anserine.  Light touch sensation intact throughout.  Passively she is able to get full range of motion with flexion and extension.  _____________________________________________________  STUDIES REVIEWED:  No new studies to review.  Previous MRI dated 4/1/2024 was entirely normal of the right knee.     PROCEDURES PERFORMED:  None today.    Db Chambers MD

## 2024-10-14 NOTE — PATIENT INSTRUCTIONS
Pathophysiology was reviewed with the patient.  She will be started on Mobic with precautions with the Prozac which she reports she is not taking at current.  She is in the process of getting a new psychiatric treatment facility.  May continue to ice.  If formal prescription for physical therapy to work on hip and knee range of motion 8 and patellofemoral tracking and strengthening.  Patient will be seen back in 6 weeks in the orthopedic office for repeat evaluation.   She should stop the use of the ibuprofen while on the Mobic.

## 2025-01-17 ENCOUNTER — OFFICE VISIT (OUTPATIENT)
Dept: URGENT CARE | Facility: CLINIC | Age: 20
End: 2025-01-17
Payer: COMMERCIAL

## 2025-01-17 ENCOUNTER — APPOINTMENT (OUTPATIENT)
Dept: RADIOLOGY | Facility: CLINIC | Age: 20
End: 2025-01-17
Payer: COMMERCIAL

## 2025-01-17 VITALS
HEIGHT: 62 IN | BODY MASS INDEX: 25.17 KG/M2 | SYSTOLIC BLOOD PRESSURE: 111 MMHG | WEIGHT: 136.8 LBS | TEMPERATURE: 98.6 F | DIASTOLIC BLOOD PRESSURE: 57 MMHG | OXYGEN SATURATION: 97 % | RESPIRATION RATE: 20 BRPM | HEART RATE: 86 BPM

## 2025-01-17 DIAGNOSIS — S69.91XA INJURY OF RIGHT HAND, INITIAL ENCOUNTER: ICD-10-CM

## 2025-01-17 DIAGNOSIS — S69.91XA INJURY OF RIGHT HAND, INITIAL ENCOUNTER: Primary | ICD-10-CM

## 2025-01-17 DIAGNOSIS — S60.511A ABRASION OF SKIN OF RIGHT HAND: ICD-10-CM

## 2025-01-17 DIAGNOSIS — S62.356A NONDISPLACED FRACTURE OF SHAFT OF FIFTH METACARPAL BONE, RIGHT HAND, INITIAL ENCOUNTER FOR CLOSED FRACTURE: ICD-10-CM

## 2025-01-17 PROCEDURE — 99213 OFFICE O/P EST LOW 20 MIN: CPT | Performed by: PHYSICIAN ASSISTANT

## 2025-01-17 PROCEDURE — 73130 X-RAY EXAM OF HAND: CPT

## 2025-01-17 RX ORDER — CEPHALEXIN 500 MG/1
500 CAPSULE ORAL EVERY 8 HOURS SCHEDULED
Qty: 21 CAPSULE | Refills: 0 | Status: SHIPPED | OUTPATIENT
Start: 2025-01-17 | End: 2025-01-24

## 2025-01-17 RX ORDER — SULFACETAMIDE SODIUM, SULFUR 100; 50 MG/G; MG/G
EMULSION TOPICAL
COMMUNITY
Start: 2025-01-01

## 2025-01-17 NOTE — PROGRESS NOTES
[facility]    NAME: Maranda Rodríguez is a 19 y.o. female  : 2005    MRN: [id number]  DATE: 2025  TIME: 6:09 PM    Assessment and Plan   Injury of right hand, initial encounter [S69.91XA]  1. Injury of right hand, initial encounter  XR hand 3+ vw right      2. Nondisplaced fracture of shaft of fifth metacarpal bone, right hand, initial encounter for closed fracture  Ambulatory Referral to Orthopedic Surgery      3. Abrasion of skin of right hand  cephalexin (KEFLEX) 500 mg capsule      Buckle fracture along ulnar aspect of mid shaft fifth metacarpal    Patient Instructions     Patient Instructions   Keep brace on until seen by ortho  Ibuprofen/tylenol as needed.      Keep wounds clean and dry.    Antibiotic as directed.       Chief Complaint     Chief Complaint   Patient presents with    Hand Pain     Right hand pain for 3 days after punching a white board repeatedly. Multiple abrasions on hand, ONRTH. Fingers occasionally painful. Can grasp appropriately        History of Present Illness   19 year old female here with complaint of persistent right hand pain along fifth finger and ulnar aspect of hand after punching a white board 4 days ago.  Has numerous abrasions on her hand that are scabbed over.  Tetanus is up to date.        Review of Systems   Review of Systems   Constitutional:  Negative for chills and fever.   Respiratory:  Negative for cough and shortness of breath.    Musculoskeletal:         Swelling and pain along ulnar aspect of hand, and little finger right hand   Skin:  Positive for color change and wound.       Current Medications     Current Outpatient Medications:     cephalexin (KEFLEX) 500 mg capsule, Take 1 capsule (500 mg total) by mouth every 8 (eight) hours for 7 days, Disp: 21 capsule, Rfl: 0    ibuprofen (MOTRIN) 800 mg tablet, Take 1 tablet (800 mg total) by mouth every 8 (eight) hours as needed for mild pain, Disp: 30 tablet, Rfl: 0    meloxicam (Mobic) 15 mg  tablet, Take 1 tablet (15 mg total) by mouth daily, Disp: 30 tablet, Rfl: 1    sertraline (ZOLOFT) 50 mg tablet, Take 1 tablet by mouth in the morning, Disp: , Rfl:     Sulfacetamide Sodium-Sulfur 10-5 % LIQD, APPLY TO ACNE PRONE SKIN (FACE, SHOULDERS), LET SIT FOR 5 MINS, THEN WASH OFF, ONCE DAILY IN SHOWER, Disp: , Rfl:     albuterol (PROVENTIL HFA,VENTOLIN HFA) 90 mcg/act inhaler, Inhale 2 puffs every 6 (six) hours as needed for wheezing   (Patient not taking: Reported on 3/22/2024), Disp: , Rfl:     cephalexin (KEFLEX) 500 mg capsule, Take 500 mg by mouth every 8 (eight) hours (Patient not taking: Reported on 3/22/2024), Disp: , Rfl:     famotidine (PEPCID) 20 mg tablet, TAKE 1 PO BID PRN for any GERD symptoms. (Patient not taking: Reported on 3/22/2024), Disp: 90 tablet, Rfl: 2    FLUoxetine (PROzac) 20 MG tablet, Take 20 mg by mouth every morning (Patient not taking: Reported on 1/17/2025), Disp: , Rfl:     medroxyPROGESTERone acetate (DEPO-PROVERA SYRINGE) 150 mg/mL injection, Inject 150 mg into a muscle every 3 (three) months (Patient not taking: Reported on 8/26/2024), Disp: , Rfl:     ondansetron (ZOFRAN) 4 mg tablet, Take 1 tablet (4 mg total) by mouth every 8 (eight) hours as needed for nausea or vomiting (Patient not taking: Reported on 1/17/2025), Disp: 20 tablet, Rfl: 0    ondansetron (ZOFRAN-ODT) 4 mg disintegrating tablet, Take 1 tablet (4 mg total) by mouth every 8 (eight) hours as needed for nausea or vomiting for up to 20 doses (Patient not taking: Reported on 1/17/2025), Disp: 20 tablet, Rfl: 0    polyethylene glycol (GLYCOLAX) 17 GM/SCOOP powder, Take 17 g by mouth 2 (two) times a day, Disp: 578 g, Rfl: 10    traZODone (DESYREL) 50 mg tablet, TAKE ONE OR TWO AT BEDTIME, Disp: , Rfl:     Current Allergies     Allergies as of 01/17/2025 - Reviewed 01/17/2025   Allergen Reaction Noted    Doxycycline Rash 05/30/2024    Latex Rash 04/12/2019          The following portions of the patient's history  were reviewed and updated as appropriate: allergies, current medications, past family history, past medical history, past social history, past surgical history and problem list.   Past Medical History:   Diagnosis Date    Anxiety     Asthma     Back pain     Depression     GERD (gastroesophageal reflux disease)     POTS (postural orthostatic tachycardia syndrome)      Past Surgical History:   Procedure Laterality Date    TONSILLECTOMY      TONSILLECTOMY       Family History   Problem Relation Age of Onset    Bipolar disorder Mother     Scoliosis Mother     Allergy (severe) Sister     Kidney disease Brother     Breast cancer Maternal Aunt     Diabetes Maternal Grandmother      Social History     Socioeconomic History    Marital status: Single     Spouse name: Not on file    Number of children: Not on file    Years of education: Not on file    Highest education level: Not on file   Occupational History    Not on file   Tobacco Use    Smoking status: Every Day     Current packs/day: 0.25     Types: Cigarettes     Passive exposure: Current    Smokeless tobacco: Never    Tobacco comments:     one cigarette a day   Vaping Use    Vaping status: Former    Substances: Nicotine, Flavoring   Substance and Sexual Activity    Alcohol use: Not Currently     Comment: social    Drug use: Not Currently     Comment: previopus use of meth.     Sexual activity: Yes   Other Topics Concern    Not on file   Social History Narrative    Not on file     Social Drivers of Health     Financial Resource Strain: Not on file   Food Insecurity: Not on file   Transportation Needs: Not on file   Physical Activity: Not on file   Stress: Not on file   Social Connections: Unknown (6/18/2024)    Received from [facility]     How often do you feel lonely or isolated from those around you? (Adult - for ages 18 years and over): Not on file   Intimate Partner Violence: Not on file   Housing Stability: Not on file     Medications have  "been verified.    Objective   /57   Pulse 86   Temp 98.6 °F (37 °C)   Resp 20   Ht 5' 2\" (1.575 m)   Wt 62.1 kg (136 lb 12.8 oz)   SpO2 97%   BMI 25.02 kg/m²      Physical Exam   Physical Exam  Vitals and nursing note reviewed.   Cardiovascular:      Rate and Rhythm: Normal rate and regular rhythm.      Pulses: Normal pulses.      Heart sounds: Normal heart sounds. No murmur heard.  Pulmonary:      Effort: Pulmonary effort is normal. No respiratory distress.      Breath sounds: Normal breath sounds.   Musculoskeletal:        Hands:       Cervical back: Normal range of motion.      Comments: Numerous abrasions on the dorsal aspect of right hand                     "

## 2025-01-22 ENCOUNTER — TELEPHONE (OUTPATIENT)
Dept: OBGYN CLINIC | Facility: CLINIC | Age: 20
End: 2025-01-22

## 2025-01-22 NOTE — TELEPHONE ENCOUNTER
Left message for patient to call back and reschedule 1/23/25 appointment with Dr. Ramachandran    2nd out reach to patient

## 2025-01-24 ENCOUNTER — OFFICE VISIT (OUTPATIENT)
Dept: OBGYN CLINIC | Facility: CLINIC | Age: 20
End: 2025-01-24
Payer: COMMERCIAL

## 2025-01-24 VITALS — BODY MASS INDEX: 25.25 KG/M2 | HEIGHT: 62 IN | WEIGHT: 137.2 LBS

## 2025-01-24 DIAGNOSIS — S69.91XA HAND INJURY, RIGHT, INITIAL ENCOUNTER: Primary | ICD-10-CM

## 2025-01-24 DIAGNOSIS — M25.561 PATELLOFEMORAL ARTHRALGIA OF RIGHT KNEE: ICD-10-CM

## 2025-01-24 PROCEDURE — 99214 OFFICE O/P EST MOD 30 MIN: CPT | Performed by: STUDENT IN AN ORGANIZED HEALTH CARE EDUCATION/TRAINING PROGRAM

## 2025-01-24 RX ORDER — MELOXICAM 15 MG/1
15 TABLET ORAL DAILY
Qty: 14 TABLET | Refills: 0 | Status: SHIPPED | OUTPATIENT
Start: 2025-01-24 | End: 2025-02-07

## 2025-01-24 RX ORDER — HYDROXYZINE HYDROCHLORIDE 10 MG/1
10 TABLET, FILM COATED ORAL EVERY 6 HOURS PRN
COMMUNITY

## 2025-01-24 NOTE — PROGRESS NOTES
ASSESSMENT/PLAN:    Assessment:   Right hand small finger ulnar collateral ligament sprain of the MP joint.  There are no evidence of fracture or dislocation.    I discussed with the patient the cause, disruption associated with, and treatment joint injuries.  Typically, these injuries heal with nonsurgical means.  Nonsurgical methodologies include Coban wrapping for edema control.  Activity modification is helpful for the injured tissue to rest and repair.  Gentle range of motion is recommended to decrease stiffness and help with swelling.  Elevation with the injured extremity at the heart level can also help with swelling, As can icing the injured part.  Icing should be performed and a 20 minutes on and 20 minutes off regimen.  Tylenol and oral anti-inflammatories such as ibuprofen or naproxen can be helpful for pain and for swelling.  Caution should be used when taking NSAIDs given the potential complications related to stomach irritation, in the setting of blood thinners, and this in the setting of kidney and heart disease. We discussed that swelling may take up to 6 months to go down to a baseline level.  We also discussed that typically range of motion is best garnered within the 1st 6 weeks otherwise significant stiffness can ensue.  Surgical intervention is only required for those with unstable joints or residual laxity, associated articular fractures, or arthritis associated with a chronic issue.        The patient verbalized understanding of exam findings and treatment plan. We engaged in the shared decision-making process and treatment options were discussed at length with the patient. Surgical and conservative management discussed today along with risks and benefits.      Plan:   I had a discussion with the patient regarding my clinical findings, diagnosis, and treatment plan.  All questions answered.   Xrays of right hand were reviewed in the office today.   Discussed that patient likely sprained the  UCL of the small finger.   Discussed that ligaments take 6 weeks to 3 months to heal and symptoms improve.   Recommend use of ady loops on the small and ring fingers.   May discontinue brace at this time.   May perform range of motion as tolerated.   Prescribed meloxicam for pain relief.   Next Visit:  Return in about 5 weeks (around 2/28/2025).      _____________________________________________________  CHIEF COMPLAINT:  Right hand injury      SUBJECTIVE:  Maranda Rodríguez is a 19 y.o. right hand dominant female presenting for evaluation of right hand pain. The patient states the injury occurred while patient punched a white board. After injury he was initially evaluated by Urgent Care on 1/17/2025. They were placed in a splint and referred for follow-up.  Patient states pieces of the white board broke off and punctured her hand at the time of injury. Patient states she continues to have pain on the dorsum of hand near the base of the ring and small fingers. Patient reports having some numbness in the pinky. Patient states she has taken the splint off several times since being at Urgent Care. Here to establish care.    DOI: ~ 1/13/2025    Occupation: not currently working       PAST MEDICAL HISTORY:  Past Medical History:   Diagnosis Date    Anxiety     Asthma     Back pain     Depression     GERD (gastroesophageal reflux disease)     POTS (postural orthostatic tachycardia syndrome)        PAST SURGICAL HISTORY:  Past Surgical History:   Procedure Laterality Date    TONSILLECTOMY      TONSILLECTOMY         FAMILY HISTORY:  Family History   Problem Relation Age of Onset    Bipolar disorder Mother     Scoliosis Mother     Allergy (severe) Sister     Kidney disease Brother     Breast cancer Maternal Aunt     Diabetes Maternal Grandmother        SOCIAL HISTORY:  Social History     Tobacco Use    Smoking status: Every Day     Current packs/day: 0.25     Types: Cigarettes     Passive exposure: Current    Smokeless  "tobacco: Never    Tobacco comments:     one cigarette a day   Vaping Use    Vaping status: Former    Substances: Nicotine, Flavoring   Substance Use Topics    Alcohol use: Not Currently     Comment: social    Drug use: Not Currently     Comment: previopus use of meth.        MEDICATIONS:    Current Outpatient Medications:     albuterol (PROVENTIL HFA,VENTOLIN HFA) 90 mcg/act inhaler, Inhale 2 puffs every 6 (six) hours as needed for wheezing   (Patient not taking: Reported on 3/22/2024), Disp: , Rfl:     cephalexin (KEFLEX) 500 mg capsule, Take 1 capsule (500 mg total) by mouth every 8 (eight) hours for 7 days, Disp: 21 capsule, Rfl: 0    FLUoxetine (PROzac) 20 MG tablet, Take 20 mg by mouth every morning (Patient not taking: Reported on 1/17/2025), Disp: , Rfl:     ibuprofen (MOTRIN) 800 mg tablet, Take 1 tablet (800 mg total) by mouth every 8 (eight) hours as needed for mild pain, Disp: 30 tablet, Rfl: 0    meloxicam (Mobic) 15 mg tablet, Take 1 tablet (15 mg total) by mouth daily, Disp: 30 tablet, Rfl: 1    ondansetron (ZOFRAN) 4 mg tablet, Take 1 tablet (4 mg total) by mouth every 8 (eight) hours as needed for nausea or vomiting (Patient not taking: Reported on 1/17/2025), Disp: 20 tablet, Rfl: 0    ondansetron (ZOFRAN-ODT) 4 mg disintegrating tablet, Take 1 tablet (4 mg total) by mouth every 8 (eight) hours as needed for nausea or vomiting for up to 20 doses (Patient not taking: Reported on 1/17/2025), Disp: 20 tablet, Rfl: 0    sertraline (ZOLOFT) 50 mg tablet, Take 1 tablet by mouth in the morning, Disp: , Rfl:     Sulfacetamide Sodium-Sulfur 10-5 % LIQD, APPLY TO ACNE PRONE SKIN (FACE, SHOULDERS), LET SIT FOR 5 MINS, THEN WASH OFF, ONCE DAILY IN SHOWER, Disp: , Rfl:     ALLERGIES:  Allergies   Allergen Reactions    Doxycycline Rash    Latex Rash       REVIEW OF SYSTEMS:  Pertinent items are noted in HPI.  A comprehensive review of systems was negative.    LABS:  HgA1c: No results found for: \"HGBA1C\"  BMP: " "  Lab Results   Component Value Date    CALCIUM 9.4 12/14/2024    K 3.8 12/14/2024    CO2 20 (L) 12/14/2024     12/14/2024    BUN 13 12/14/2024    CREATININE 0.78 12/14/2024         _____________________________________________________  PHYSICAL EXAMINATION:  Vital signs: Ht 5' 2\" (1.575 m)   Wt 62.2 kg (137 lb 3.2 oz)   BMI 25.09 kg/m²   General: well developed and well nourished, alert, oriented times 3, and appears comfortable  Psychiatric: Normal  HEENT: Trachea Midline, No torticollis  Cardiovascular: No discernable arrhythmia  Pulmonary: No wheezing or stridor  Abdomen: No rebound or guarding  Extremities: No peripheral edema  Skin: No masses, erythema, lacerations, fluctation, ulcerations  Neurovascular: Sensation Intact to the Median, Ulnar, Radial Nerve, Motor Intact to the Median, Ulnar, Radial Nerve, and Pulses Intact    MUSCULOSKELETAL EXAMINATION:  Right hand  Multiple punctate scab wounds over dorsum of hand near MP joints. No erythema or ecchymosis. No swelling or deformity.   All finger MP joints stable to collateral ligament stress testing with valgus and varus stress  TTP:  Scaphoid tubercle: no   Anatomic snuff box: no  SL interval: no   ECU: no   Fovea: no  All phalanges: no  Fifth metacarpal along dorsum near MP joint: yes  Range of Motion:  Elbow: extension/flexion intact  Forearm: pronation/supination intact  Wrist: extension/flexion intact  Digit: full extension of all fingers. With attempted composite fist, no scissoring or malrotation. 1cm of ring finger to DPC actively. 2cm of small finger to DPC actively.   Motor Exam: firing AIN/PIN/U  Sensory Exam: Sensation intact to light touch in FDWS (radial), volar IF (median), volar SF (ulnar)  Vascular Exam: < 2 sec capillary refill   Two point discrimination: 5mm throughout   _____________________________________________________  STUDIES REVIEWED:  I reviewed imaging in PACS from 1/17/2025 of the right hand which demonstrates no acute " fracture or dislocation.       PROCEDURES PERFORMED:  Procedures  None performed today.     Scribe Attestation      I,:  Alpa Linares PA-C am acting as a scribe while in the presence of the attending physician.:       I,:  Harjit Ramachandran MD personally performed the services described in this documentation    as scribed in my presence.:

## 2025-03-15 ENCOUNTER — APPOINTMENT (OUTPATIENT)
Dept: RADIOLOGY | Facility: CLINIC | Age: 20
End: 2025-03-15
Payer: OTHER MISCELLANEOUS

## 2025-03-15 ENCOUNTER — OCCMED (OUTPATIENT)
Dept: URGENT CARE | Facility: CLINIC | Age: 20
End: 2025-03-15
Payer: OTHER MISCELLANEOUS

## 2025-03-15 DIAGNOSIS — M25.521 RIGHT ELBOW PAIN: Primary | ICD-10-CM

## 2025-03-15 DIAGNOSIS — M25.521 RIGHT ELBOW PAIN: ICD-10-CM

## 2025-03-15 PROCEDURE — G0382 LEV 3 HOSP TYPE B ED VISIT: HCPCS | Performed by: PHYSICIAN ASSISTANT

## 2025-03-15 PROCEDURE — 73080 X-RAY EXAM OF ELBOW: CPT

## 2025-03-15 PROCEDURE — 99283 EMERGENCY DEPT VISIT LOW MDM: CPT | Performed by: PHYSICIAN ASSISTANT

## 2025-03-18 ENCOUNTER — APPOINTMENT (OUTPATIENT)
Dept: URGENT CARE | Facility: CLINIC | Age: 20
End: 2025-03-18
Payer: OTHER MISCELLANEOUS

## 2025-03-18 PROCEDURE — 99213 OFFICE O/P EST LOW 20 MIN: CPT | Performed by: PHYSICIAN ASSISTANT

## 2025-03-21 ENCOUNTER — HOSPITAL ENCOUNTER (EMERGENCY)
Facility: HOSPITAL | Age: 20
Discharge: HOME | End: 2025-03-21
Attending: EMERGENCY MEDICINE
Payer: COMMERCIAL

## 2025-03-21 VITALS
WEIGHT: 142.2 LBS | RESPIRATION RATE: 20 BRPM | BODY MASS INDEX: 26.01 KG/M2 | DIASTOLIC BLOOD PRESSURE: 77 MMHG | SYSTOLIC BLOOD PRESSURE: 150 MMHG | OXYGEN SATURATION: 98 % | HEART RATE: 78 BPM | TEMPERATURE: 98 F

## 2025-03-21 DIAGNOSIS — M25.521 RIGHT ELBOW PAIN: Primary | ICD-10-CM

## 2025-03-21 PROCEDURE — 99284 EMERGENCY DEPT VISIT MOD MDM: CPT | Performed by: EMERGENCY MEDICINE

## 2025-03-21 PROCEDURE — 99283 EMERGENCY DEPT VISIT LOW MDM: CPT

## 2025-03-21 RX ORDER — OXYCODONE HYDROCHLORIDE 5 MG/1
5 TABLET ORAL ONCE
Refills: 0 | Status: COMPLETED | OUTPATIENT
Start: 2025-03-21 | End: 2025-03-21

## 2025-03-21 RX ORDER — METHOCARBAMOL 500 MG/1
1000 TABLET, FILM COATED ORAL 2 TIMES DAILY
Qty: 40 TABLET | Refills: 0 | Status: SHIPPED | OUTPATIENT
Start: 2025-03-21 | End: 2025-04-03

## 2025-03-21 RX ADMIN — OXYCODONE HYDROCHLORIDE 5 MG: 5 TABLET ORAL at 02:00

## 2025-03-21 NOTE — ED PROVIDER NOTES
Time reflects when diagnosis was documented in both MDM as applicable and the Disposition within this note       Time User Action Codes Description Comment    3/21/2025  1:43 AM Bartolo Hoffmann Add [M25.521] Right elbow pain           ED Disposition       ED Disposition   Discharge    Condition   Stable    Date/Time   Fri Mar 21, 2025  1:43 AM    Comment   Maranda Rodríguez discharge to home/self care.                   Assessment & Plan       Medical Decision Making  I reviewed the patient's medical chart, PMHx, prior encounters, medications.    My DDx includes: Sprain of right elbow, occult elbow fracture.  I suspect that DVT is less likely as the patient has no swelling, normal capillary refill, as well as mechanism.  I do not appreciate evidence of compartment syndrome, patient has soft compartments, normal cap refill, normal radial pulse.    Reviewed patient's prior x-ray, I do not see evidence of fracture.    I offered the patient several options of evaluation, including CT scan to evaluate for occult fracture, as well as D-dimer to evaluate for DVT.  At this time, she states that she would feel most comfortable being placed in a splint, and declined further testing. She wants to undergo MRI as outpatient.  I recommended that she follow-up closely with orthopedics given significant pain for further recommendations, particularly if she has worsening pain. She understands this.    Patient was comfortable with plan for discharge. She was discharged with strict return precautions.    Risk  Prescription drug management.             Medications   oxyCODONE (ROXICODONE) IR tablet 5 mg (5 mg Oral Given 3/21/25 0200)       ED Risk Strat Scores                            SBIRT 22yo+      Flowsheet Row Most Recent Value   Initial Alcohol Screen: US AUDIT-C     1. How often do you have a drink containing alcohol? 0 Filed at: 03/21/2025 0111   2. How many drinks containing alcohol do you have on a typical day you are  "drinking?  0 Filed at: 03/21/2025 0111   3a. Male UNDER 65: How often do you have five or more drinks on one occasion? 0 Filed at: 03/21/2025 0111   3b. FEMALE Any Age, or MALE 65+: How often do you have 4 or more drinks on one occassion? 0 Filed at: 03/21/2025 0111   Audit-C Score 0 Filed at: 03/21/2025 0111   SLADE: How many times in the past year have you...    Used an illegal drug or used a prescription medication for non-medical reasons? Never Filed at: 03/21/2025 0111                            History of Present Illness       Chief Complaint   Patient presents with    Joint Swelling     P-t got hurt at work 03/15/25 states she can't move her elbow and its swelling up        Past Medical History:   Diagnosis Date    Anxiety     Asthma     Back pain     Depression     GERD (gastroesophageal reflux disease)     POTS (postural orthostatic tachycardia syndrome)       Past Surgical History:   Procedure Laterality Date    TONSILLECTOMY      TONSILLECTOMY        Family History   Problem Relation Age of Onset    Bipolar disorder Mother     Scoliosis Mother     Allergy (severe) Sister     Kidney disease Brother     Breast cancer Maternal Aunt     Diabetes Maternal Grandmother       Social History     Tobacco Use    Smoking status: Every Day     Current packs/day: 0.25     Types: Cigarettes     Passive exposure: Current    Smokeless tobacco: Never    Tobacco comments:     one cigarette a day   Vaping Use    Vaping status: Former   Substance Use Topics    Alcohol use: Not Currently     Comment: social    Drug use: Not Currently     Comment: previopus use of meth.       E-Cigarette/Vaping    E-Cigarette Use Former User     Cartridges/Day \"1 cartridge for 2 weeks\".       E-Cigarette/Vaping Substances    Nicotine No     THC No     CBD No     Flavoring No     Other No     Unknown No       I have reviewed and agree with the history as documented.     19-year-old female who presents for right elbow pain.  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  She states that since then, she has had continued pain in the right elbow.  She describes that at 1 point her whole entire arm was swollen and discolored, although currently it is significantly improved.  She describes decreased sensation to the fourth and fifth digits, as well as significant pain with moving the hand.  She can only minimally flex and extend the elbow due to significant pain.  He states that she is planning to follow-up with orthopedics however is working to obtain an MRI prior to her appointment.  Review of systems otherwise negative.        Review of Systems   Constitutional:  Negative for chills and fever.   HENT:  Negative for congestion, rhinorrhea and sore throat.    Respiratory:  Negative for cough and shortness of breath.    Cardiovascular:  Negative for chest pain and palpitations.   Gastrointestinal:  Negative for abdominal pain, constipation, diarrhea, nausea and vomiting.   Genitourinary:  Negative for difficulty urinating and flank pain.   Musculoskeletal:  Positive for arthralgias.   Neurological:  Negative for dizziness, weakness, light-headedness and headaches.   Psychiatric/Behavioral:  Negative for agitation, behavioral problems and confusion.    All other systems reviewed and are negative.          Objective       ED Triage Vitals [03/21/25 0107]   Temperature Pulse Blood Pressure Respirations SpO2 Patient Position - Orthostatic VS   98 °F (36.7 °C) 100 135/81 18 98 % Sitting      Temp Source Heart Rate Source BP Location FiO2 (%) Pain Score    Temporal Monitor Left arm -- 10 - Worst Possible Pain      Vitals      Date and Time Temp Pulse SpO2 Resp BP Pain Score FACES Pain Rating User   03/21/25 0200 -- -- -- -- -- 10 - Worst Possible Pain -- AK   03/21/25 0130 -- 78 98 % 20 150/77 -- -- AK   03/21/25 0107 98 °F (36.7 °C) 100 98 % 18 135/81 10 - Worst Possible Pain -- RJP            Physical Exam  Constitutional:        Appearance: She is well-developed.   HENT:      Head: Normocephalic and atraumatic.   Cardiovascular:      Rate and Rhythm: Normal rate and regular rhythm.      Heart sounds: Normal heart sounds. No murmur heard.     No friction rub.   Pulmonary:      Effort: Pulmonary effort is normal. No respiratory distress.      Breath sounds: Normal breath sounds. No wheezing or rales.   Abdominal:      General: Bowel sounds are normal. There is no distension.      Palpations: Abdomen is soft.      Tenderness: There is no abdominal tenderness.   Musculoskeletal:         General: Tenderness present. Normal range of motion.      Cervical back: Normal range of motion and neck supple.      Comments: Patient has significant tenderness to the posterior lower arm, throughout the elbow.  There is no swelling.  Compartments are soft.  No erythema.  There is no swelling distally at this time.  Normal radial pulse, normal capillary refill, normal temperature compared to contralateral arm.  Patient does have significant pain with movements of the hand, including  strength, finger abduction, thumbs up, OK so difficult to assess for weakness, at this time strength 4/5 throughout but limited by pain.    Has no tenderness throughout the shoulder, wrist, hand   Skin:     General: Skin is warm.   Neurological:      Mental Status: She is alert and oriented to person, place, and time.      Coordination: Coordination normal.      Comments: Patient reports decreased sensation to the fourth and fifth digits   Psychiatric:         Behavior: Behavior normal.         Thought Content: Thought content normal.         Judgment: Judgment normal.         Results Reviewed       None            No orders to display       Procedures    ED Medication and Procedure Management   Prior to Admission Medications   Prescriptions Last Dose Informant Patient Reported? Taking?   albuterol (PROVENTIL HFA,VENTOLIN HFA) 90 mcg/act inhaler  Self Yes No   Sig: Inhale 2  puffs every 6 (six) hours as needed for wheezing     Patient not taking: Reported on 3/22/2024   ibuprofen (MOTRIN) 800 mg tablet   No No   Sig: Take 1 tablet (800 mg total) by mouth every 8 (eight) hours as needed for mild pain   sertraline (ZOLOFT) 50 mg tablet   Yes No   Sig: Take 1 tablet by mouth in the morning      Facility-Administered Medications: None     Discharge Medication List as of 3/21/2025  1:44 AM        CONTINUE these medications which have NOT CHANGED    Details   albuterol (PROVENTIL HFA,VENTOLIN HFA) 90 mcg/act inhaler Inhale 2 puffs every 6 (six) hours as needed for wheezing  , Historical Med      ibuprofen (MOTRIN) 800 mg tablet Take 1 tablet (800 mg total) by mouth every 8 (eight) hours as needed for mild pain, Starting Mon 8/26/2024, Normal      sertraline (ZOLOFT) 50 mg tablet Take 1 tablet by mouth in the morning, Starting Tue 11/12/2024, Historical Med           No discharge procedures on file.  ED SEPSIS DOCUMENTATION   Time reflects when diagnosis was documented in both MDM as applicable and the Disposition within this note       Time User Action Codes Description Comment    3/21/2025  1:43 AM Bartolo Hoffmann Add [M25.521] Right elbow pain                  Bartolo Hoffmann MD  03/21/25 2628

## 2025-03-21 NOTE — DISCHARGE INSTRUCTIONS
Please follow all return precautions.    Remain in sling until orthopedics evaluation.    Thank you.

## 2025-04-03 ENCOUNTER — OFFICE VISIT (OUTPATIENT)
Dept: OBGYN CLINIC | Facility: CLINIC | Age: 20
End: 2025-04-03
Payer: OTHER MISCELLANEOUS

## 2025-04-03 VITALS
OXYGEN SATURATION: 97 % | HEART RATE: 80 BPM | HEIGHT: 62 IN | WEIGHT: 132 LBS | BODY MASS INDEX: 24.29 KG/M2 | TEMPERATURE: 98.3 F

## 2025-04-03 DIAGNOSIS — S56.911A ELBOW STRAIN, RIGHT, INITIAL ENCOUNTER: Primary | ICD-10-CM

## 2025-04-03 PROCEDURE — 99213 OFFICE O/P EST LOW 20 MIN: CPT | Performed by: ORTHOPAEDIC SURGERY

## 2025-04-03 NOTE — LETTER
April 3, 2025     Patient: Maranda Rodríguez  YOB: 2005  Date of Visit: 4/3/2025      To Whom it May Concern:    Maranda Rodríguez is under my professional care. Maranda was seen in my office on 4/3/2025. Maranda may return to work with limitations of left handed duty only. No use of her right arm. These restrictions are to be in place until her next visit in approximately 3 weeks .    If you have any questions or concerns, please don't hesitate to call.         Sincerely,          Syd De Anda DO        CC: No Recipients

## 2025-04-03 NOTE — PROGRESS NOTES
Assessment & Plan  Elbow strain, right, initial encounter  Maranda presents today for initial evaluation right elbow strain, date of injury 3/15/2025 at work.  I did review her MRI with her at today's visit that demonstrates no evidence of internal derangement.  She is in continued pain and discomfort and recommended over-the-counter medication for pain relief.  During today's examination she does mention numbness and tingling and decreased sensation in her 3rd through 5th fingers of her right hand.  I discussed with her that if this worsens or fail to improve we may consider obtaining an EMG for further evaluation.  I did discuss with her that given the acute nature of her injury it is still too soon to perform this exam.  I would like her to begin outpatient physical therapy for elbow strengthening range of motion.  I did provide her with a work note stating that she should only do left upper extremity duty and no use of her right arm.  She is to follow-up in approximately 3 weeks for reevaluation.  Orders:    Ambulatory referral to Physical Therapy; Future    The patient has a strain of her right elbow.  There is no structural abnormalities.  She is complaining of decreased sensation.  There is a negative Tinel's.  The majority of her pain is along the radial head.  Would recommend therapy for now.  May need nerve conduction studies but will have to wait 3 weeks to prevent any false positive or false negative results.  She may work with restricted duty for now.      Subjective:   Patient ID: Maranda Rodríguez  2005     HPI  Patient is a 19 y.o. female who presents for initial evaluation right elbow pain.  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  She felt a pop followed by significant pain.  She states several days later she did have increased pain and discomfort as well as swelling and bruising.  She mentions on 3/21/2025 she did go  to the emergency department where x-rays were obtained that ruled out any acute osseous abnormalities.  She was offered a CT scan to rule out an occult fracture as well as a D-dimer to rule out DVT, however she declined and only wished for a sling.  She has been compliant with use of a sling.  She states that the pain and discomfort has minimally improved since her injury.  She also reports that she has had ongoing numbness and tingling in her third 3rd through 5th fingers.  She states that most of her pain is along the radial aspect of her elbow as well as the posterior aspect of her elbow.  She denies any previous history of injury or trauma to her elbow.    The following portions of the patient's history were reviewed and updated as appropriate:  Past medical history, past surgical history, Family history, social history, current medications and allergies    Past Medical History:   Diagnosis Date    Anxiety     Asthma     Back pain     Depression     GERD (gastroesophageal reflux disease)     POTS (postural orthostatic tachycardia syndrome)        Past Surgical History:   Procedure Laterality Date    TONSILLECTOMY      TONSILLECTOMY         Family History   Problem Relation Age of Onset    Bipolar disorder Mother     Scoliosis Mother     Allergy (severe) Sister     Kidney disease Brother     Breast cancer Maternal Aunt     Diabetes Maternal Grandmother        Social History     Socioeconomic History    Marital status: Single     Spouse name: None    Number of children: None    Years of education: None    Highest education level: None   Occupational History    None   Tobacco Use    Smoking status: Every Day     Current packs/day: 0.25     Types: Cigarettes     Passive exposure: Current    Smokeless tobacco: Never    Tobacco comments:     one cigarette a day   Vaping Use    Vaping status: Former   Substance and Sexual Activity    Alcohol use: Not Currently     Comment: social    Drug use: Not Currently     Comment:  previopus use of meth.     Sexual activity: Yes     Partners: Male     Birth control/protection: None   Other Topics Concern    None   Social History Narrative    None     Social Drivers of Health     Financial Resource Strain: Not on file   Food Insecurity: Not on file   Transportation Needs: Not on file   Physical Activity: Not on file   Stress: Not on file   Social Connections: Unknown (6/18/2024)    Received from [employer]     How often do you feel lonely or isolated from those around you? (Adult - for ages 18 years and over): Not on file   Intimate Partner Violence: Not on file   Housing Stability: Not on file         Current Outpatient Medications:     ibuprofen (MOTRIN) 800 mg tablet, Take 1 tablet (800 mg total) by mouth every 8 (eight) hours as needed for mild pain, Disp: 30 tablet, Rfl: 0    methocarbamol (ROBAXIN) 500 mg tablet, Take 2 tablets (1,000 mg total) by mouth 2 (two) times a day for 10 days, Disp: 40 tablet, Rfl: 0    sertraline (ZOLOFT) 50 mg tablet, Take 1 tablet by mouth in the morning, Disp: , Rfl:     albuterol (PROVENTIL HFA,VENTOLIN HFA) 90 mcg/act inhaler, Inhale 2 puffs every 6 (six) hours as needed for wheezing   (Patient not taking: Reported on 3/22/2024), Disp: , Rfl:     Allergies   Allergen Reactions    Doxycycline Rash    Latex Rash       Review of Systems   Constitutional:  Negative for chills and fever.   HENT:  Negative for ear pain and sore throat.    Eyes:  Negative for pain and visual disturbance.   Respiratory:  Negative for cough and shortness of breath.    Cardiovascular:  Negative for chest pain and palpitations.   Gastrointestinal:  Negative for abdominal pain and vomiting.   Genitourinary:  Negative for dysuria and hematuria.   Musculoskeletal:  Negative for arthralgias and back pain.   Skin:  Negative for color change and rash.   Neurological:  Negative for seizures and syncope.   All other systems reviewed and are negative.    "    Objective:  Pulse 80   Temp 98.3 °F (36.8 °C) (Temporal)   Ht 5' 2\" (1.575 m)   Wt 59.9 kg (132 lb)   LMP 03/11/2025 (Approximate)   SpO2 97%   BMI 24.14 kg/m²     Ortho Exam  Right Elbow Exam  Alignment:  Normal elbow alignment and carrying angle.  Inspection:  No swelling. No edema. No erythema. No muscle atrophy. No deformity.  Palpation:   Radial head and triceps tenderness. No effusion. No warmth. No crepitus.  Numbness and tingling in the 3rd through 5th fingers  ROM:   Limited elbow and wrist range of motion secondary to pain  Strength:   Deferred due to pain  Stability:  No objective elbow instability.  Stable varus and valgus stress.  Tests:  No pertinent positive or negative tests.  Neurovascular:  Sensation intact in Ax/R/M/U nerve distributions. 2+ radial pulse.     Physical Exam  Vitals and nursing note reviewed.   Constitutional:       General: She is not in acute distress.     Appearance: She is well-developed.   HENT:      Head: Normocephalic and atraumatic.   Eyes:      Conjunctiva/sclera: Conjunctivae normal.   Cardiovascular:      Rate and Rhythm: Normal rate and regular rhythm.      Heart sounds: No murmur heard.  Pulmonary:      Effort: Pulmonary effort is normal. No respiratory distress.      Breath sounds: Normal breath sounds.   Abdominal:      Palpations: Abdomen is soft.      Tenderness: There is no abdominal tenderness.   Musculoskeletal:         General: No swelling.      Cervical back: Neck supple.   Skin:     General: Skin is warm and dry.      Capillary Refill: Capillary refill takes less than 2 seconds.   Neurological:      Mental Status: She is alert.   Psychiatric:         Mood and Affect: Mood normal.          Diagnostic Test Review:  X-rays of the right elbow performed 3/15/2025 were reviewed which demonstrates no acute osseous abnormalities.  MRI of right elbow without contrast performed 3/28/2025 was reviewed which demonstrates no evidence of internal " derangement.    Procedures   No procedures performed.     Scribe Attestation      I,:  Marito Jordan am acting as a scribe while in the presence of the attending physician.:       I,:  Syd De Anda, DO personally performed the services described in this documentation    as scribed in my presence.:

## 2025-04-23 ENCOUNTER — EVALUATION (OUTPATIENT)
Dept: PHYSICAL THERAPY | Facility: HOME HEALTHCARE | Age: 20
End: 2025-04-23
Payer: OTHER MISCELLANEOUS

## 2025-04-23 DIAGNOSIS — S56.911D STRAIN OF RIGHT ELBOW, SUBSEQUENT ENCOUNTER: Primary | ICD-10-CM

## 2025-04-23 PROCEDURE — 97161 PT EVAL LOW COMPLEX 20 MIN: CPT | Performed by: PHYSICAL THERAPIST

## 2025-04-23 PROCEDURE — 97110 THERAPEUTIC EXERCISES: CPT | Performed by: PHYSICAL THERAPIST

## 2025-04-24 ENCOUNTER — OFFICE VISIT (OUTPATIENT)
Dept: OBGYN CLINIC | Facility: CLINIC | Age: 20
End: 2025-04-24
Payer: OTHER MISCELLANEOUS

## 2025-04-24 VITALS — BODY MASS INDEX: 24.29 KG/M2 | WEIGHT: 132 LBS | HEIGHT: 62 IN

## 2025-04-24 DIAGNOSIS — S46.911D MUSCLE STRAIN OF RIGHT UPPER EXTREMITY, SUBSEQUENT ENCOUNTER: Primary | ICD-10-CM

## 2025-04-24 PROCEDURE — 99213 OFFICE O/P EST LOW 20 MIN: CPT | Performed by: ORTHOPAEDIC SURGERY

## 2025-04-24 NOTE — LETTER
April 24, 2025     Patient: Maranda Rodríguez  YOB: 2005  Date of Visit: 4/24/2025      To Whom it May Concern:    Maranda Rodríguez is under my professional care. Maranda was seen in my office on 4/24/2025. Maranda may return to work on April 24, 2025.  Limitations include use of work left-handed .    If you have any questions or concerns, please don't hesitate to call.         Sincerely,          Syd De Anda,         CC: Maranda [email or web address]

## 2025-04-24 NOTE — PROGRESS NOTES
"Assessment/Plan:  Assessment & Plan   Diagnoses and all orders for this visit:    Muscle strain of right upper extremity, subsequent encounter        It appears the patient sustained a strain of her right upper extremity.  She was complaining of \"spasms\".  This was not present at the time of examination.  There is marked weakness.  She only performed 1 therapy session yesterday.  Would recommend continuation this for an extended period of time.  Return back in 6 weeks for evaluation.  The patient understands she will have to perform a home exercise program multiple times a day.  Work status remains unchanged.  If her condition changes, she would not hesitate to let us know    Subjective:   Patient ID: Maranda Rodríguez is a 19 y.o. female.    HPI    The patient has a history of a work-related right elbow/upper extremity strain from mid March.  She was sent for physical therapy but only started yesterday.  She states she still some pain and now complaining of \"spasms\"    The following portions of the patient's history were reviewed and updated as appropriate: allergies, current medications, past family history, past medical history, past social history, past surgical history, and problem list.    Review of Systems   Constitutional:  Negative for chills, fever and unexpected weight change.   HENT:  Negative for hearing loss, nosebleeds and sore throat.    Eyes:  Negative for pain, redness and visual disturbance.   Respiratory:  Negative for cough, shortness of breath and wheezing.    Cardiovascular:  Negative for chest pain, palpitations and leg swelling.   Gastrointestinal:  Negative for abdominal pain, nausea and vomiting.   Endocrine: Negative for polydipsia and polyuria.   Genitourinary:  Negative for dysuria and hematuria.   Musculoskeletal:  Positive for arthralgias and myalgias. Negative for back pain, gait problem, joint swelling, neck pain and neck stiffness.        As noted in HPI   Skin:  Negative for rash " and wound.   Neurological:  Negative for dizziness, numbness and headaches.   Psychiatric/Behavioral:  Negative for decreased concentration and suicidal ideas. The patient is not nervous/anxious.        Objective:  Ortho Exam    Neck was soft and supple.  There is a negative axial compression test in her neck.  Right upper extremity is neuro vastly intact.  Fingers are pink and mobile.  Compartments are soft.  No spasms are present.  There is diffuse tenderness along her arm albeit she cannot isolate to any region.  No trophic changes present.  Can make a composite fist with verbal cues.  No ligament dysfunction along her elbow.

## 2025-04-30 ENCOUNTER — OFFICE VISIT (OUTPATIENT)
Dept: PHYSICAL THERAPY | Facility: HOME HEALTHCARE | Age: 20
End: 2025-04-30
Payer: OTHER MISCELLANEOUS

## 2025-04-30 DIAGNOSIS — S56.911D STRAIN OF RIGHT ELBOW, SUBSEQUENT ENCOUNTER: Primary | ICD-10-CM

## 2025-04-30 DIAGNOSIS — M25.561 PATELLOFEMORAL ARTHRALGIA OF RIGHT KNEE: ICD-10-CM

## 2025-04-30 PROCEDURE — 97140 MANUAL THERAPY 1/> REGIONS: CPT | Performed by: PHYSICAL THERAPIST

## 2025-04-30 PROCEDURE — 97110 THERAPEUTIC EXERCISES: CPT | Performed by: PHYSICAL THERAPIST

## 2025-04-30 NOTE — PROGRESS NOTES
Daily Note     Today's date: 2025  Patient name: Maranda Rodríguez  : 2005  MRN: [id number]  Referring provider: Syd De Anda DO  Dx:   Encounter Diagnosis     ICD-10-CM    1. Strain of right elbow, subsequent encounter  S56.911D                      Subjective: Pt states that she thinks she is getting a little more motion in the elbow.       Objective: See treatment diary below      Assessment: Poor tolerance to TE and manual stretching due to increased pain with all activities.  Laser applied to end session to help with pain management; will assess pt response to session NV and progress as tolerable.     Plan: Continue per plan of care.         Visit Count 1 2              Manuals       R elbow flex/ext, supin/pron  HZ      R wrist all planes   HZ       Laser   Subacute elbow strain   HZ               Neuro Re-Ed         MTP/LTP         B ER        Tband: triceps                                         Ther Ex        UBE - alt  -- -- --     Elbow curls  HEP X 15      Shrugs  X 15       Shld retractions   X 15       Supination / Pronation  HEP       Digi flex  Towel sq   HEP        Putty:  , roll, pinch   Tan    1 min       Tension pins   Yellow/Red  On/off x 1       Wrist flex stretch         Wrist ext stretch                         Ther Activity                        Gait Training                        Modalities        CP to end prn

## 2025-05-02 ENCOUNTER — OFFICE VISIT (OUTPATIENT)
Dept: PHYSICAL THERAPY | Facility: HOME HEALTHCARE | Age: 20
End: 2025-05-02
Payer: OTHER MISCELLANEOUS

## 2025-05-02 DIAGNOSIS — S56.911D STRAIN OF RIGHT ELBOW, SUBSEQUENT ENCOUNTER: Primary | ICD-10-CM

## 2025-05-02 PROCEDURE — 97140 MANUAL THERAPY 1/> REGIONS: CPT

## 2025-05-02 PROCEDURE — 97110 THERAPEUTIC EXERCISES: CPT

## 2025-05-05 ENCOUNTER — APPOINTMENT (OUTPATIENT)
Dept: PHYSICAL THERAPY | Facility: HOME HEALTHCARE | Age: 20
End: 2025-05-05
Payer: OTHER MISCELLANEOUS

## 2025-05-08 ENCOUNTER — OFFICE VISIT (OUTPATIENT)
Dept: PHYSICAL THERAPY | Facility: HOME HEALTHCARE | Age: 20
End: 2025-05-08
Payer: OTHER MISCELLANEOUS

## 2025-05-08 DIAGNOSIS — S56.911D STRAIN OF RIGHT ELBOW, SUBSEQUENT ENCOUNTER: Primary | ICD-10-CM

## 2025-05-08 PROCEDURE — 97140 MANUAL THERAPY 1/> REGIONS: CPT

## 2025-05-08 PROCEDURE — 97110 THERAPEUTIC EXERCISES: CPT

## 2025-05-08 NOTE — PROGRESS NOTES
Daily Note     Today's date: 2025  Patient name: Maranda Rodríguez  : 2005  MRN: [id number]  Referring provider: Syd De Anda DO  Dx: No diagnosis found.    Start Time: 1110          Subjective: I have been getting sharp pain in my R middle finger when I  + lift something and then my fingers go numb. I tried to shuffle cards last night and it was awful.     Objective: See treatment diary below    Assessment: Pt with poor wilfred to session. Pt with c/o pain and burning sensation at R forearm, hand and fingers with TE and MT. Pt unable to use tension pins or buttons 2* pain. Provided Laser with good wilfred. Discussed ice massage and possible use of Voltaren for pain relief.  Instructed pt in gentle self nerve glides.  Patient would benefit from continued PT      Plan: Continue per plan of care.        Visit Count 1 2 3 4            Manuals  5-2 5-8    R elbow flex/ext, supin/pron  HZ EC EC    R wrist all planes   HZ  EC EC    Laser   Subacute elbow strain   HZ  EC EC            Neuro Re-Ed         MTP/LTP         B ER        Tband: triceps                                         Ther Ex        UBE - alt  -- -- -- unable    Elbow curls  HEP X 15 x15 x15    Shrugs  X 15  x15 x15    Shld retractions   X 15  x15 x15    Supination / Pronation  HEP       Digi flex  Towel sq   HEP        Putty:  , roll, pinch   Tan    1 min  Tan      1' Tan , roll 1' ea    Tension buttons    Yellow  Index,middle  1x5 ea    Tension pins   Yellow/Red  On/off x 1  Yellow   On/off x1 Yellow  Unable = pain    Wrist flex stretch         Wrist ext stretch                         Ther Activity                        Gait Training                        Modalities        CP to end prn

## 2025-05-19 ENCOUNTER — APPOINTMENT (OUTPATIENT)
Dept: PHYSICAL THERAPY | Facility: HOME HEALTHCARE | Age: 20
End: 2025-05-19
Payer: OTHER MISCELLANEOUS

## 2025-05-22 ENCOUNTER — OFFICE VISIT (OUTPATIENT)
Dept: PHYSICAL THERAPY | Facility: HOME HEALTHCARE | Age: 20
End: 2025-05-22
Payer: OTHER MISCELLANEOUS

## 2025-05-22 DIAGNOSIS — M25.561 PATELLOFEMORAL ARTHRALGIA OF RIGHT KNEE: ICD-10-CM

## 2025-05-22 DIAGNOSIS — S56.911D STRAIN OF RIGHT ELBOW, SUBSEQUENT ENCOUNTER: Primary | ICD-10-CM

## 2025-05-22 PROCEDURE — 97110 THERAPEUTIC EXERCISES: CPT | Performed by: PHYSICAL THERAPIST

## 2025-05-22 PROCEDURE — 97140 MANUAL THERAPY 1/> REGIONS: CPT | Performed by: PHYSICAL THERAPIST

## 2025-05-22 NOTE — PROGRESS NOTES
"Daily Note     Today's date: 2025  Patient name: Maranda Rodríguez  : 2005  MRN: [id number]  Referring provider: Syd De Anda DO  Dx:   Encounter Diagnosis     ICD-10-CM    1. Strain of right elbow, subsequent encounter  S56.911D       2. Patellofemoral arthralgia of right knee  M25.561                      Subjective: Pt states that the elbow moves better after she is moving it awhile but it always stiffens up.       Objective: See treatment diary below      Assessment: Pt became tearful during session due to increased pain/burning sensation in the elbow; treatment modified as result.  Elbow ROM improving but remains painful in all planes.  Provided pt with putty to work on progressed HEP.     Plan: Continue per plan of care.        Visit Count 1 2 3 4 5           Manuals  5-2 5-8    R elbow flex/ext, supin/pron  HZ EC EC HZ   + manual isometrics elbow flex/ext   5\" x 5    R wrist all planes   HZ  EC EC    Laser   Subacute elbow strain   HZ  EC EC            Neuro Re-Ed         MTP/LTP         B ER        Tband: triceps                                         Ther Ex        UBE - alt  -- -- -- unable Fwd   5 min   Timer    Elbow curls  HEP X 15 x15 x15    Shrugs  X 15  x15 x15    Shld retractions   X 15  x15 x15    Supination / Pronation  HEP    W/ hammer   X 10    Digi flex  Towel sq   HEP     Yellow  3\" x 10    Putty:  , roll, pinch   Tan    1 min  Tan      1' Tan , roll 1' ea Tan   , roll, pinch x 1' ea    Tension buttons    Yellow  Index,middle  1x5 ea    Tension pins   Yellow/Red  On/off x 1  Yellow   On/off x1 Yellow  Unable = pain Yellow/Red   On/off x 1    Wrist flex stretch         Wrist ext stretch                         Ther Activity                        Gait Training                        Modalities        CP to end prn                         "

## 2025-05-28 ENCOUNTER — OFFICE VISIT (OUTPATIENT)
Dept: URGENT CARE | Facility: CLINIC | Age: 20
End: 2025-05-28
Payer: COMMERCIAL

## 2025-05-28 VITALS
HEART RATE: 82 BPM | TEMPERATURE: 98 F | RESPIRATION RATE: 20 BRPM | BODY MASS INDEX: 24.36 KG/M2 | SYSTOLIC BLOOD PRESSURE: 110 MMHG | WEIGHT: 133.2 LBS | DIASTOLIC BLOOD PRESSURE: 59 MMHG | OXYGEN SATURATION: 98 %

## 2025-05-28 DIAGNOSIS — J40 BRONCHITIS: Primary | ICD-10-CM

## 2025-05-28 PROCEDURE — 99213 OFFICE O/P EST LOW 20 MIN: CPT

## 2025-05-28 RX ORDER — BENZONATATE 100 MG/1
100 CAPSULE ORAL 3 TIMES DAILY PRN
Qty: 20 CAPSULE | Refills: 0 | Status: SHIPPED | OUTPATIENT
Start: 2025-05-28

## 2025-05-28 NOTE — PROGRESS NOTES
[facility]        NAME: Maranda Rodríguez is a 19 y.o. female  : 2005    MRN: [id number]  DATE: May 28, 2025  TIME: 1:05 PM    /59   Pulse 82   Temp 98 °F (36.7 °C)   Resp 20   Wt 60.4 kg (133 lb 3.2 oz)   SpO2 98%   BMI 24.36 kg/m²     Assessment and Plan   Bronchitis [J40]  1. Bronchitis  amoxicillin-clavulanate (AUGMENTIN) 875-125 mg per tablet    benzonatate (TESSALON PERLES) 100 mg capsule            Patient Instructions       Follow up with PCP in 3-5 days.  Proceed to  ER if symptoms worsen.    Chief Complaint     Chief Complaint   Patient presents with    Cough     X 2.5 weeks     Generalized Body Aches         History of Present Illness       Pt with 2-3 weeks cough and congestion     Cough    Generalized Body Aches  Associated symptoms include congestion and coughing.       Review of Systems   Review of Systems   Constitutional: Negative.    HENT:  Positive for congestion.    Eyes: Negative.    Respiratory:  Positive for cough.    Cardiovascular: Negative.    Gastrointestinal: Negative.    Endocrine: Negative.    Genitourinary: Negative.    Musculoskeletal: Negative.    Skin: Negative.    Allergic/Immunologic: Negative.    Neurological: Negative.    Hematological: Negative.    Psychiatric/Behavioral: Negative.     All other systems reviewed and are negative.        Current Medications     Current Medications[1]    Current Allergies     Allergies as of 2025 - Reviewed 2025   Allergen Reaction Noted    Doxycycline Rash 2024    Latex Rash 2019            The following portions of the patient's history were reviewed and updated as appropriate: allergies, current medications, past family history, past medical history, past social history, past surgical history and problem list.     Past Medical History[2]    Past Surgical History[3]    Family History[4]      Medications have been verified.        Objective   /59   Pulse 82   Temp 98 °F (36.7 °C)    Resp 20   Wt 60.4 kg (133 lb 3.2 oz)   SpO2 98%   BMI 24.36 kg/m²        Physical Exam     Physical Exam  Vitals and nursing note reviewed.   Constitutional:       Appearance: Normal appearance. She is normal weight.   HENT:      Head: Normocephalic and atraumatic.      Right Ear: Tympanic membrane, ear canal and external ear normal.      Left Ear: Tympanic membrane, ear canal and external ear normal.      Nose: Nose normal.      Mouth/Throat:      Mouth: Mucous membranes are moist.      Pharynx: Oropharynx is clear.     Eyes:      Extraocular Movements: Extraocular movements intact.      Conjunctiva/sclera: Conjunctivae normal.      Pupils: Pupils are equal, round, and reactive to light.       Cardiovascular:      Rate and Rhythm: Normal rate and regular rhythm.      Pulses: Normal pulses.      Heart sounds: Normal heart sounds.   Pulmonary:      Effort: Pulmonary effort is normal.      Comments: Minor coarse sounds cleared wit cough   Abdominal:      Palpations: Abdomen is soft.     Musculoskeletal:         General: Normal range of motion.      Cervical back: Normal range of motion and neck supple.     Skin:     General: Skin is warm.      Capillary Refill: Capillary refill takes less than 2 seconds.     Neurological:      Mental Status: She is alert and oriented to person, place, and time.                          [1]   Current Outpatient Medications:     amoxicillin-clavulanate (AUGMENTIN) 875-125 mg per tablet, Take 1 tablet by mouth every 12 (twelve) hours for 10 days, Disp: 20 tablet, Rfl: 0    benzonatate (TESSALON PERLES) 100 mg capsule, Take 1 capsule (100 mg total) by mouth 3 (three) times a day as needed for cough, Disp: 20 capsule, Rfl: 0    sertraline (ZOLOFT) 50 mg tablet, Take 1 tablet by mouth in the morning, Disp: , Rfl:     albuterol (PROVENTIL HFA,VENTOLIN HFA) 90 mcg/act inhaler, Inhale 2 puffs every 6 (six) hours as needed for wheezing, Disp: , Rfl:     ibuprofen (MOTRIN) 800 mg tablet, Take  1 tablet (800 mg total) by mouth every 8 (eight) hours as needed for mild pain, Disp: 30 tablet, Rfl: 0    methocarbamol (ROBAXIN) 500 mg tablet, Take 2 tablets (1,000 mg total) by mouth 2 (two) times a day for 10 days, Disp: 40 tablet, Rfl: 0  [2]   Past Medical History:  Diagnosis Date    Anxiety     Asthma     Back pain     Depression     GERD (gastroesophageal reflux disease)     POTS (postural orthostatic tachycardia syndrome)    [3]   Past Surgical History:  Procedure Laterality Date    TONSILLECTOMY      TONSILLECTOMY     [4]   Family History  Problem Relation Name Age of Onset    Bipolar disorder Mother      Scoliosis Mother      Allergy (severe) Sister      Kidney disease Brother      Breast cancer Maternal Aunt      Diabetes Maternal Grandmother

## 2025-06-05 ENCOUNTER — OFFICE VISIT (OUTPATIENT)
Dept: OBGYN CLINIC | Facility: CLINIC | Age: 20
End: 2025-06-05
Payer: OTHER MISCELLANEOUS

## 2025-06-05 VITALS
WEIGHT: 133 LBS | OXYGEN SATURATION: 98 % | HEART RATE: 94 BPM | BODY MASS INDEX: 24.48 KG/M2 | TEMPERATURE: 98.4 F | HEIGHT: 62 IN

## 2025-06-05 DIAGNOSIS — S46.911D MUSCLE STRAIN OF RIGHT UPPER EXTREMITY, SUBSEQUENT ENCOUNTER: Primary | ICD-10-CM

## 2025-06-05 PROCEDURE — 99213 OFFICE O/P EST LOW 20 MIN: CPT | Performed by: ORTHOPAEDIC SURGERY

## 2025-06-05 NOTE — PROGRESS NOTES
Assessment & Plan  Muscle strain of right upper extremity, subsequent encounter  The patient has a history of a strain in the right upper extremity. She has been attending formal physical therapy but today complains of a new onset of tingling her her right ring and small fingers. We discussed the benefits of an EMG study for further diagnosis and to rule out any nerve involvement. She will follow up at the EMG is completed. The patient expresses understanding and is in agreement with today's treatment plan. They may contact the office with any question or concerns.     Orders:    EMG; Future    The patient has persistent pain along her right elbow.  She is complaining of numbness into her 4th and 5th fingers.  There is a Tinel's both medially and laterally along her elbow.  Would recommend obtaining nerve conduction studies.  Return back once complete.  She can work left-handed only    Subjective:   Patient ID: Maranda Rodríguez  2005     HPI  Patient is a 20 y.o. female who presents for follow up evaluation of her right upper extremity. She was last seen on 4/24/2025 at which time she was encouraged to continue PT. Today, she notes very mild improvements. She has been attending physical therapy. She is now complaining of tingling in the ring and small finger. She has trouble carrying her son. 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    The following portions of the patient's history were reviewed and updated as appropriate:  Past medical history, past surgical history, Family history, social history, current medications and allergies    Past Medical History[1]    Past Surgical History[2]    Family History[3]    Social History[4]    Current Medications[5]    Allergies   Allergen Reactions    Doxycycline Rash    Latex Rash       Review of Systems   Constitutional:  Negative for chills and fever.   HENT:  Negative for ear pain and sore throat.    Eyes:  Negative for pain and visual disturbance.   Respiratory:   "Negative for cough and shortness of breath.    Cardiovascular:  Negative for chest pain and palpitations.   Gastrointestinal:  Negative for abdominal pain and vomiting.   Genitourinary:  Negative for dysuria and hematuria.   Musculoskeletal:  Negative for arthralgias and back pain.   Skin:  Negative for color change and rash.   Neurological:  Negative for seizures and syncope.   All other systems reviewed and are negative.       Objective:  Pulse 94   Temp 98.4 °F (36.9 °C) (Temporal)   Ht 5' 2\" (1.575 m)   Wt 60.3 kg (133 lb)   SpO2 98%   BMI 24.33 kg/m²     Ortho Exam  right Elbow -    No anatomical deformity  Skin is warm and dry to touch with no signs of erythema, ecchymosis, or infection  No soft tissue swelling or effusion noted  No palpable crepitus with passive motion  No tenderness to palpate  Positive Tinel's medially and laterally  ROM: flexion 125°, extension 0°, pronation 90°,  and supination 90°  Strength: 5/5 throughout  - varus laxity, - valgus laxity  Demonstrates normal shoulder, wrist, and finger motion  - radial head instability  - ulnar nerve subluxation  2+ distal radial pulse with brisk capillary refill to the fingers  Radial, median, and ulnar motor and sensory distrubution intact  Sensation to light touch intact distally       Physical Exam  Vitals and nursing note reviewed.   Constitutional:       General: She is not in acute distress.     Appearance: She is well-developed.   HENT:      Head: Normocephalic and atraumatic.     Eyes:      Conjunctiva/sclera: Conjunctivae normal.       Cardiovascular:      Rate and Rhythm: Normal rate and regular rhythm.      Heart sounds: No murmur heard.  Pulmonary:      Effort: Pulmonary effort is normal. No respiratory distress.      Breath sounds: Normal breath sounds.   Abdominal:      Palpations: Abdomen is soft.      Tenderness: There is no abdominal tenderness.     Musculoskeletal:         General: No swelling.      Cervical back: Neck supple. "     Skin:     General: Skin is warm and dry.      Capillary Refill: Capillary refill takes less than 2 seconds.     Neurological:      Mental Status: She is alert.     Psychiatric:         Mood and Affect: Mood normal.          Diagnostic Test Review:  No new images to review    Procedures   None performed at today's visit    Scribe Attestation      I,:  Brandi Sandoval am acting as a scribe while in the presence of the attending physician.:       I,:  Syd De Anda DO personally performed the services described in this documentation    as scribed in my presence.:                   [1]   Past Medical History:  Diagnosis Date    Anxiety     Asthma     Back pain     Depression     GERD (gastroesophageal reflux disease)     POTS (postural orthostatic tachycardia syndrome)    [2]   Past Surgical History:  Procedure Laterality Date    TONSILLECTOMY      TONSILLECTOMY     [3]   Family History  Problem Relation Name Age of Onset    Bipolar disorder Mother      Scoliosis Mother      Allergy (severe) Sister      Kidney disease Brother      Breast cancer Maternal Aunt      Diabetes Maternal Grandmother     [4]   Social History  Socioeconomic History    Marital status: Single   Tobacco Use    Smoking status: Every Day     Current packs/day: 0.25     Types: Cigarettes     Passive exposure: Current    Smokeless tobacco: Never    Tobacco comments:     one cigarette a day   Vaping Use    Vaping status: Former   Substance and Sexual Activity    Alcohol use: Not Currently     Comment: social    Drug use: Not Currently     Comment: previopus use of meth.     Sexual activity: Yes     Partners: Male     Birth control/protection: None     Social Drivers of Health      Received from [employer]   [5]   Current Outpatient Medications:     albuterol (PROVENTIL HFA,VENTOLIN HFA) 90 mcg/act inhaler, Inhale 2 puffs every 6 (six) hours as needed for wheezing, Disp: , Rfl:     amoxicillin-clavulanate (AUGMENTIN) 155125  mg per tablet, Take 1 tablet by mouth every 12 (twelve) hours for 10 days, Disp: 20 tablet, Rfl: 0    benzonatate (TESSALON PERLES) 100 mg capsule, Take 1 capsule (100 mg total) by mouth 3 (three) times a day as needed for cough, Disp: 20 capsule, Rfl: 0    ibuprofen (MOTRIN) 800 mg tablet, Take 1 tablet (800 mg total) by mouth every 8 (eight) hours as needed for mild pain, Disp: 30 tablet, Rfl: 0    sertraline (ZOLOFT) 50 mg tablet, Take 1 tablet by mouth in the morning, Disp: , Rfl:     methocarbamol (ROBAXIN) 500 mg tablet, Take 2 tablets (1,000 mg total) by mouth 2 (two) times a day for 10 days, Disp: 40 tablet, Rfl: 0

## 2025-06-05 NOTE — LETTER
June 5, 2025     Patient: Maranda Rodríguez   YOB: 2005   Date of Visit: 6/5/2025       To Whom It May Concern:    It is my medical opinion that Maranda Rodríguez may return to work on 6/5/2025. Limitations include use of work left-handed.    If you have any questions or concerns, please don't hesitate to call.         Sincerely,        Syd De Anda,     CC: No Recipients

## 2025-06-08 NOTE — PATIENT INSTRUCTIONS
I have prescribed an antibiotic for the infection.  Please take the antibiotic as prescribed and finish the entire prescription.  Take an over the counter probiotic or eat yogurt with live cultures in it (activia) to keep good bacteria in the gut and help prevent diarrhea.  Wash hands frequently to prevent the spread of infection.  Can use over the counter cough and cold medications to help with symptoms.  Ibuprofen and/or tylenol as needed for pain or fever.  If not improving over the next 7-10 days, follow up with PCP.        
0

## 2025-06-27 NOTE — PATIENT INSTRUCTIONS
Conjunctivae and eyelids appear normal,  Sclerae : White without injection Keep brace on until seen by ortho  Ibuprofen/tylenol as needed.      Keep wounds clean and dry.    Antibiotic as directed.      Statement Selected

## 2025-06-30 ENCOUNTER — TELEPHONE (OUTPATIENT)
Age: 20
End: 2025-06-30

## 2025-06-30 NOTE — TELEPHONE ENCOUNTER
Caller: Seth     Doctor/Office: Dr De Anda    CB#: [phone]       What needs to be faxed: 6/5/25 ov note and work letter    ATTN to: Jazmin    Fax#: [phone]      Documents were successfully e-faxed

## 2025-07-15 ENCOUNTER — TELEPHONE (OUTPATIENT)
Age: 20
End: 2025-07-15

## 2025-07-23 ENCOUNTER — TELEPHONE (OUTPATIENT)
Age: 20
End: 2025-07-23

## 2025-07-23 NOTE — TELEPHONE ENCOUNTER
Caller: Self    Doctor: Dr. De Anda    Reason for call: Patient states she was sent home from work because they received a note that said she cannot return until the 29th. Informed her the note that was faxed today states she can return today under sedentary duty but she cannot return to full duty until after EMG which is on the 29th per forms that were completed. Patient will  have her w/c adjustor look into it and let us know if she needs anything else    Call back#: [phone]

## 2025-07-23 NOTE — TELEPHONE ENCOUNTER
Caller: patient     Doctor: Dr. 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  She will need a letter stating she is cleared for light duty.     Fax # [phone] attn: Saloni     Call back#: [phone]    Thank you.

## 2025-07-25 ENCOUNTER — TELEPHONE (OUTPATIENT)
Age: 20
End: 2025-07-25

## 2025-07-25 NOTE — TELEPHONE ENCOUNTER
Caller: Raya     Doctor: Dr. De Anda    Reason for call: WC  called asking if the EMG order can be changed to bilat UE    Call back#: [phone]

## 2025-07-27 ENCOUNTER — HOSPITAL ENCOUNTER (EMERGENCY)
Facility: HOSPITAL | Age: 20
Discharge: HOME | End: 2025-07-27
Attending: EMERGENCY MEDICINE | Admitting: EMERGENCY MEDICINE
Payer: COMMERCIAL

## 2025-07-27 ENCOUNTER — APPOINTMENT (EMERGENCY)
Dept: RADIOLOGY | Facility: HOSPITAL | Age: 20
End: 2025-07-27
Payer: COMMERCIAL

## 2025-07-27 VITALS
SYSTOLIC BLOOD PRESSURE: 119 MMHG | OXYGEN SATURATION: 98 % | TEMPERATURE: 98.6 F | HEART RATE: 61 BPM | DIASTOLIC BLOOD PRESSURE: 60 MMHG | BODY MASS INDEX: 24.19 KG/M2 | RESPIRATION RATE: 16 BRPM | WEIGHT: 132.28 LBS

## 2025-07-27 DIAGNOSIS — R07.9 LEFT-SIDED CHEST PAIN: ICD-10-CM

## 2025-07-27 DIAGNOSIS — E83.42 HYPOMAGNESEMIA: ICD-10-CM

## 2025-07-27 DIAGNOSIS — R42 LIGHTHEADEDNESS: Primary | ICD-10-CM

## 2025-07-27 LAB
ALBUMIN SERPL BCG-MCNC: 4.2 G/DL (ref 3.5–5)
ALP SERPL-CCNC: 36 U/L (ref 34–104)
ALT SERPL W P-5'-P-CCNC: 9 U/L (ref 7–52)
ANION GAP SERPL CALCULATED.3IONS-SCNC: 6 MMOL/L (ref 4–13)
AST SERPL W P-5'-P-CCNC: 12 U/L (ref 13–39)
BASOPHILS # BLD AUTO: 0.04 THOUSANDS/ÂΜL (ref 0–0.1)
BASOPHILS NFR BLD AUTO: 1 % (ref 0–1)
BILIRUB SERPL-MCNC: 0.48 MG/DL (ref 0.2–1)
BILIRUB UR QL STRIP: NEGATIVE
BUN SERPL-MCNC: 11 MG/DL (ref 5–25)
CALCIUM SERPL-MCNC: 8.7 MG/DL (ref 8.4–10.2)
CARDIAC TROPONIN I PNL SERPL HS: <2 NG/L (ref ?–50)
CHLORIDE SERPL-SCNC: 107 MMOL/L (ref 96–108)
CLARITY UR: CLEAR
CO2 SERPL-SCNC: 26 MMOL/L (ref 21–32)
COLOR UR: COLORLESS
CREAT SERPL-MCNC: 0.75 MG/DL (ref 0.6–1.3)
EOSINOPHIL # BLD AUTO: 0.38 THOUSAND/ÂΜL (ref 0–0.61)
EOSINOPHIL NFR BLD AUTO: 6 % (ref 0–6)
ERYTHROCYTE [DISTWIDTH] IN BLOOD BY AUTOMATED COUNT: 11.8 % (ref 11.6–15.1)
EXT PREGNANCY TEST URINE: NEGATIVE
EXT. CONTROL: NORMAL
GFR SERPL CREATININE-BSD FRML MDRD: 113 ML/MIN/1.73SQ M
GLUCOSE SERPL-MCNC: 86 MG/DL (ref 65–140)
GLUCOSE UR STRIP-MCNC: NEGATIVE MG/DL
HCT VFR BLD AUTO: 40.2 % (ref 34.8–46.1)
HGB BLD-MCNC: 13.3 G/DL (ref 11.5–15.4)
HGB UR QL STRIP.AUTO: NEGATIVE
IMM GRANULOCYTES # BLD AUTO: 0.02 THOUSAND/UL (ref 0–0.2)
IMM GRANULOCYTES NFR BLD AUTO: 0 % (ref 0–2)
KETONES UR STRIP-MCNC: NEGATIVE MG/DL
LEUKOCYTE ESTERASE UR QL STRIP: NEGATIVE
LYMPHOCYTES # BLD AUTO: 2.1 THOUSANDS/ÂΜL (ref 0.6–4.47)
LYMPHOCYTES NFR BLD AUTO: 36 % (ref 14–44)
MAGNESIUM SERPL-MCNC: 1.8 MG/DL (ref 1.9–2.7)
MCH RBC QN AUTO: 31.5 PG (ref 26.8–34.3)
MCHC RBC AUTO-ENTMCNC: 33.1 G/DL (ref 31.4–37.4)
MCV RBC AUTO: 95 FL (ref 82–98)
MONOCYTES # BLD AUTO: 0.37 THOUSAND/ÂΜL (ref 0.17–1.22)
MONOCYTES NFR BLD AUTO: 6 % (ref 4–12)
NEUTROPHILS # BLD AUTO: 2.99 THOUSANDS/ÂΜL (ref 1.85–7.62)
NEUTS SEG NFR BLD AUTO: 51 % (ref 43–75)
NITRITE UR QL STRIP: NEGATIVE
NRBC BLD AUTO-RTO: 0 /100 WBCS
PH UR STRIP.AUTO: 6.5 [PH]
PLATELET # BLD AUTO: 150 THOUSANDS/UL (ref 149–390)
PMV BLD AUTO: 9.8 FL (ref 8.9–12.7)
POTASSIUM SERPL-SCNC: 4.1 MMOL/L (ref 3.5–5.3)
PROT SERPL-MCNC: 6.3 G/DL (ref 6.4–8.4)
PROT UR STRIP-MCNC: NEGATIVE MG/DL
RBC # BLD AUTO: 4.22 MILLION/UL (ref 3.81–5.12)
SODIUM SERPL-SCNC: 139 MMOL/L (ref 135–147)
SP GR UR STRIP.AUTO: <1.005 (ref 1–1.03)
UROBILINOGEN UR STRIP-ACNC: <2 MG/DL
WBC # BLD AUTO: 5.9 THOUSAND/UL (ref 4.31–10.16)

## 2025-07-27 PROCEDURE — 96374 THER/PROPH/DIAG INJ IV PUSH: CPT

## 2025-07-27 PROCEDURE — 81003 URINALYSIS AUTO W/O SCOPE: CPT | Performed by: EMERGENCY MEDICINE

## 2025-07-27 PROCEDURE — 93005 ELECTROCARDIOGRAM TRACING: CPT

## 2025-07-27 PROCEDURE — 81025 URINE PREGNANCY TEST: CPT | Performed by: EMERGENCY MEDICINE

## 2025-07-27 PROCEDURE — 99284 EMERGENCY DEPT VISIT MOD MDM: CPT

## 2025-07-27 PROCEDURE — 80053 COMPREHEN METABOLIC PANEL: CPT | Performed by: EMERGENCY MEDICINE

## 2025-07-27 PROCEDURE — 96361 HYDRATE IV INFUSION ADD-ON: CPT

## 2025-07-27 PROCEDURE — 36415 COLL VENOUS BLD VENIPUNCTURE: CPT | Performed by: EMERGENCY MEDICINE

## 2025-07-27 PROCEDURE — 99285 EMERGENCY DEPT VISIT HI MDM: CPT | Performed by: EMERGENCY MEDICINE

## 2025-07-27 PROCEDURE — 84484 ASSAY OF TROPONIN QUANT: CPT | Performed by: EMERGENCY MEDICINE

## 2025-07-27 PROCEDURE — 83735 ASSAY OF MAGNESIUM: CPT | Performed by: EMERGENCY MEDICINE

## 2025-07-27 PROCEDURE — 71045 X-RAY EXAM CHEST 1 VIEW: CPT

## 2025-07-27 PROCEDURE — 85025 COMPLETE CBC W/AUTO DIFF WBC: CPT | Performed by: EMERGENCY MEDICINE

## 2025-07-27 RX ORDER — KETOROLAC TROMETHAMINE 30 MG/ML
15 INJECTION, SOLUTION INTRAMUSCULAR; INTRAVENOUS ONCE
Status: COMPLETED | OUTPATIENT
Start: 2025-07-27 | End: 2025-07-27

## 2025-07-27 RX ORDER — LANOLIN ALCOHOL/MO/W.PET/CERES
400 CREAM (GRAM) TOPICAL ONCE
Status: COMPLETED | OUTPATIENT
Start: 2025-07-27 | End: 2025-07-27

## 2025-07-27 RX ADMIN — SODIUM CHLORIDE 1000 ML: 0.9 INJECTION, SOLUTION INTRAVENOUS at 16:17

## 2025-07-27 RX ADMIN — Medication 400 MG: at 16:50

## 2025-07-27 RX ADMIN — KETOROLAC TROMETHAMINE 15 MG: 30 INJECTION, SOLUTION INTRAMUSCULAR at 16:50

## 2025-07-28 LAB
ATRIAL RATE: 55 BPM
P AXIS: 62 DEGREES
PR INTERVAL: 114 MS
QRS AXIS: 74 DEGREES
QRSD INTERVAL: 74 MS
QT INTERVAL: 434 MS
QTC INTERVAL: 415 MS
T WAVE AXIS: 59 DEGREES
VENTRICULAR RATE: 55 BPM

## 2025-07-28 PROCEDURE — 93010 ELECTROCARDIOGRAM REPORT: CPT | Performed by: INTERNAL MEDICINE

## 2025-07-29 ENCOUNTER — HOSPITAL ENCOUNTER (OUTPATIENT)
Dept: NEUROLOGY | Facility: CLINIC | Age: 20
Discharge: HOME | End: 2025-07-29
Attending: ORTHOPAEDIC SURGERY
Payer: COMMERCIAL

## 2025-07-29 DIAGNOSIS — S46.911D MUSCLE STRAIN OF RIGHT UPPER EXTREMITY, SUBSEQUENT ENCOUNTER: ICD-10-CM

## 2025-07-29 PROBLEM — R20.0 NUMBNESS: Status: ACTIVE | Noted: 2025-07-29

## 2025-07-29 PROCEDURE — 95886 MUSC TEST DONE W/N TEST COMP: CPT | Performed by: PSYCHIATRY & NEUROLOGY

## 2025-07-29 PROCEDURE — 95910 NRV CNDJ TEST 7-8 STUDIES: CPT | Performed by: PSYCHIATRY & NEUROLOGY

## 2025-08-08 ENCOUNTER — APPOINTMENT (EMERGENCY)
Dept: CT IMAGING | Facility: HOSPITAL | Age: 20
End: 2025-08-08
Payer: COMMERCIAL

## 2025-08-08 ENCOUNTER — HOSPITAL ENCOUNTER (EMERGENCY)
Facility: HOSPITAL | Age: 20
Discharge: HOME | End: 2025-08-08
Attending: EMERGENCY MEDICINE
Payer: COMMERCIAL

## 2025-08-11 ENCOUNTER — TELEPHONE (OUTPATIENT)
Age: 20
End: 2025-08-11